# Patient Record
Sex: MALE | Race: WHITE | HISPANIC OR LATINO | Employment: OTHER | URBAN - METROPOLITAN AREA
[De-identification: names, ages, dates, MRNs, and addresses within clinical notes are randomized per-mention and may not be internally consistent; named-entity substitution may affect disease eponyms.]

---

## 2017-01-24 ENCOUNTER — ALLSCRIPTS OFFICE VISIT (OUTPATIENT)
Dept: OTHER | Facility: OTHER | Age: 62
End: 2017-01-24

## 2017-01-26 ENCOUNTER — TRANSCRIBE ORDERS (OUTPATIENT)
Dept: LAB | Facility: CLINIC | Age: 62
End: 2017-01-26

## 2017-01-26 ENCOUNTER — APPOINTMENT (OUTPATIENT)
Dept: LAB | Facility: CLINIC | Age: 62
End: 2017-01-26
Payer: MEDICARE

## 2017-01-26 DIAGNOSIS — E78.5 HYPERLIPIDEMIA: ICD-10-CM

## 2017-01-26 DIAGNOSIS — E11.9 TYPE 2 DIABETES MELLITUS WITHOUT COMPLICATIONS (HCC): ICD-10-CM

## 2017-01-26 DIAGNOSIS — I10 ESSENTIAL (PRIMARY) HYPERTENSION: ICD-10-CM

## 2017-01-26 LAB
ALBUMIN SERPL BCP-MCNC: 3.6 G/DL (ref 3.5–5)
ALP SERPL-CCNC: 95 U/L (ref 46–116)
ALT SERPL W P-5'-P-CCNC: 15 U/L (ref 12–78)
ANION GAP SERPL CALCULATED.3IONS-SCNC: 9 MMOL/L (ref 4–13)
AST SERPL W P-5'-P-CCNC: 13 U/L (ref 5–45)
BASOPHILS # BLD AUTO: 0.04 THOUSANDS/ΜL (ref 0–0.1)
BASOPHILS NFR BLD AUTO: 0 % (ref 0–1)
BILIRUB SERPL-MCNC: 0.5 MG/DL (ref 0.2–1)
BUN SERPL-MCNC: 14 MG/DL (ref 5–25)
CALCIUM SERPL-MCNC: 8.7 MG/DL (ref 8.3–10.1)
CHLORIDE SERPL-SCNC: 103 MMOL/L (ref 100–108)
CHOLEST SERPL-MCNC: 140 MG/DL (ref 50–200)
CO2 SERPL-SCNC: 26 MMOL/L (ref 21–32)
CREAT SERPL-MCNC: 0.82 MG/DL (ref 0.6–1.3)
EOSINOPHIL # BLD AUTO: 0.35 THOUSAND/ΜL (ref 0–0.61)
EOSINOPHIL NFR BLD AUTO: 3 % (ref 0–6)
ERYTHROCYTE [DISTWIDTH] IN BLOOD BY AUTOMATED COUNT: 12.6 % (ref 11.6–15.1)
EST. AVERAGE GLUCOSE BLD GHB EST-MCNC: 123 MG/DL
GFR SERPL CREATININE-BSD FRML MDRD: >60 ML/MIN/1.73SQ M
GLUCOSE SERPL-MCNC: 88 MG/DL (ref 65–140)
HBA1C MFR BLD: 5.9 % (ref 4.2–6.3)
HCT VFR BLD AUTO: 42.9 % (ref 36.5–49.3)
HDLC SERPL-MCNC: 58 MG/DL (ref 40–60)
HGB BLD-MCNC: 14.1 G/DL (ref 12–17)
LDLC SERPL CALC-MCNC: 60 MG/DL (ref 0–100)
LYMPHOCYTES # BLD AUTO: 2.74 THOUSANDS/ΜL (ref 0.6–4.47)
LYMPHOCYTES NFR BLD AUTO: 27 % (ref 14–44)
MCH RBC QN AUTO: 27.4 PG (ref 26.8–34.3)
MCHC RBC AUTO-ENTMCNC: 32.9 G/DL (ref 31.4–37.4)
MCV RBC AUTO: 84 FL (ref 82–98)
MONOCYTES # BLD AUTO: 0.92 THOUSAND/ΜL (ref 0.17–1.22)
MONOCYTES NFR BLD AUTO: 9 % (ref 4–12)
NEUTROPHILS # BLD AUTO: 6.16 THOUSANDS/ΜL (ref 1.85–7.62)
NEUTS SEG NFR BLD AUTO: 61 % (ref 43–75)
NRBC BLD AUTO-RTO: 0 /100 WBCS
PLATELET # BLD AUTO: 296 THOUSANDS/UL (ref 149–390)
PMV BLD AUTO: 10.8 FL (ref 8.9–12.7)
POTASSIUM SERPL-SCNC: 3.6 MMOL/L (ref 3.5–5.3)
PROT SERPL-MCNC: 7.6 G/DL (ref 6.4–8.2)
RBC # BLD AUTO: 5.14 MILLION/UL (ref 3.88–5.62)
SODIUM SERPL-SCNC: 138 MMOL/L (ref 136–145)
TRIGL SERPL-MCNC: 109 MG/DL
TSH SERPL DL<=0.05 MIU/L-ACNC: 1.4 UIU/ML (ref 0.36–3.74)
WBC # BLD AUTO: 10.24 THOUSAND/UL (ref 4.31–10.16)

## 2017-01-26 PROCEDURE — 80053 COMPREHEN METABOLIC PANEL: CPT

## 2017-01-26 PROCEDURE — 36415 COLL VENOUS BLD VENIPUNCTURE: CPT

## 2017-01-26 PROCEDURE — 84443 ASSAY THYROID STIM HORMONE: CPT

## 2017-01-26 PROCEDURE — 83036 HEMOGLOBIN GLYCOSYLATED A1C: CPT

## 2017-01-26 PROCEDURE — 85025 COMPLETE CBC W/AUTO DIFF WBC: CPT

## 2017-01-26 PROCEDURE — 80061 LIPID PANEL: CPT

## 2017-02-06 ENCOUNTER — GENERIC CONVERSION - ENCOUNTER (OUTPATIENT)
Dept: OTHER | Facility: OTHER | Age: 62
End: 2017-02-06

## 2017-02-27 ENCOUNTER — ALLSCRIPTS OFFICE VISIT (OUTPATIENT)
Dept: OTHER | Facility: OTHER | Age: 62
End: 2017-02-27

## 2017-03-16 ENCOUNTER — GENERIC CONVERSION - ENCOUNTER (OUTPATIENT)
Dept: OTHER | Facility: OTHER | Age: 62
End: 2017-03-16

## 2017-06-09 ENCOUNTER — ALLSCRIPTS OFFICE VISIT (OUTPATIENT)
Dept: OTHER | Facility: OTHER | Age: 62
End: 2017-06-09

## 2017-06-23 ENCOUNTER — ALLSCRIPTS OFFICE VISIT (OUTPATIENT)
Dept: OTHER | Facility: OTHER | Age: 62
End: 2017-06-23

## 2017-06-27 ENCOUNTER — APPOINTMENT (OUTPATIENT)
Dept: LAB | Facility: CLINIC | Age: 62
End: 2017-06-27
Payer: MEDICARE

## 2017-06-27 DIAGNOSIS — E78.5 HYPERLIPIDEMIA: ICD-10-CM

## 2017-06-27 DIAGNOSIS — E11.9 TYPE 2 DIABETES MELLITUS WITHOUT COMPLICATIONS (HCC): ICD-10-CM

## 2017-06-27 LAB
ALBUMIN SERPL BCP-MCNC: 3.5 G/DL (ref 3.5–5)
ALP SERPL-CCNC: 69 U/L (ref 46–116)
ALT SERPL W P-5'-P-CCNC: 16 U/L (ref 12–78)
ANION GAP SERPL CALCULATED.3IONS-SCNC: 7 MMOL/L (ref 4–13)
AST SERPL W P-5'-P-CCNC: 20 U/L (ref 5–45)
BASOPHILS # BLD AUTO: 0.04 THOUSANDS/ΜL (ref 0–0.1)
BASOPHILS NFR BLD AUTO: 1 % (ref 0–1)
BILIRUB SERPL-MCNC: 1.04 MG/DL (ref 0.2–1)
BUN SERPL-MCNC: 20 MG/DL (ref 5–25)
CALCIUM SERPL-MCNC: 8.7 MG/DL (ref 8.3–10.1)
CHLORIDE SERPL-SCNC: 103 MMOL/L (ref 100–108)
CHOLEST SERPL-MCNC: 179 MG/DL (ref 50–200)
CO2 SERPL-SCNC: 28 MMOL/L (ref 21–32)
CREAT SERPL-MCNC: 0.86 MG/DL (ref 0.6–1.3)
CREAT UR-MCNC: 125 MG/DL
EOSINOPHIL # BLD AUTO: 0.44 THOUSAND/ΜL (ref 0–0.61)
EOSINOPHIL NFR BLD AUTO: 5 % (ref 0–6)
ERYTHROCYTE [DISTWIDTH] IN BLOOD BY AUTOMATED COUNT: 13.1 % (ref 11.6–15.1)
EST. AVERAGE GLUCOSE BLD GHB EST-MCNC: 131 MG/DL
GFR SERPL CREATININE-BSD FRML MDRD: >60 ML/MIN/1.73SQ M
GLUCOSE P FAST SERPL-MCNC: 101 MG/DL (ref 65–99)
HBA1C MFR BLD: 6.2 % (ref 4.2–6.3)
HCT VFR BLD AUTO: 43 % (ref 36.5–49.3)
HDLC SERPL-MCNC: 62 MG/DL (ref 40–60)
HGB BLD-MCNC: 14 G/DL (ref 12–17)
LDLC SERPL CALC-MCNC: 83 MG/DL (ref 0–100)
LYMPHOCYTES # BLD AUTO: 3.23 THOUSANDS/ΜL (ref 0.6–4.47)
LYMPHOCYTES NFR BLD AUTO: 37 % (ref 14–44)
MCH RBC QN AUTO: 27.2 PG (ref 26.8–34.3)
MCHC RBC AUTO-ENTMCNC: 32.6 G/DL (ref 31.4–37.4)
MCV RBC AUTO: 84 FL (ref 82–98)
MICROALBUMIN UR-MCNC: 5.7 MG/L (ref 0–20)
MICROALBUMIN/CREAT 24H UR: 5 MG/G CREATININE (ref 0–30)
MONOCYTES # BLD AUTO: 0.92 THOUSAND/ΜL (ref 0.17–1.22)
MONOCYTES NFR BLD AUTO: 11 % (ref 4–12)
NEUTROPHILS # BLD AUTO: 4.08 THOUSANDS/ΜL (ref 1.85–7.62)
NEUTS SEG NFR BLD AUTO: 46 % (ref 43–75)
NRBC BLD AUTO-RTO: 0 /100 WBCS
PLATELET # BLD AUTO: 302 THOUSANDS/UL (ref 149–390)
PMV BLD AUTO: 10.6 FL (ref 8.9–12.7)
POTASSIUM SERPL-SCNC: 3.7 MMOL/L (ref 3.5–5.3)
PROT SERPL-MCNC: 6.9 G/DL (ref 6.4–8.2)
RBC # BLD AUTO: 5.14 MILLION/UL (ref 3.88–5.62)
SODIUM SERPL-SCNC: 138 MMOL/L (ref 136–145)
TRIGL SERPL-MCNC: 172 MG/DL
TSH SERPL DL<=0.05 MIU/L-ACNC: 1.36 UIU/ML (ref 0.36–3.74)
WBC # BLD AUTO: 8.74 THOUSAND/UL (ref 4.31–10.16)

## 2017-06-27 PROCEDURE — 85025 COMPLETE CBC W/AUTO DIFF WBC: CPT

## 2017-06-27 PROCEDURE — 83036 HEMOGLOBIN GLYCOSYLATED A1C: CPT

## 2017-06-27 PROCEDURE — 82570 ASSAY OF URINE CREATININE: CPT

## 2017-06-27 PROCEDURE — 80061 LIPID PANEL: CPT

## 2017-06-27 PROCEDURE — 36415 COLL VENOUS BLD VENIPUNCTURE: CPT

## 2017-06-27 PROCEDURE — 80053 COMPREHEN METABOLIC PANEL: CPT

## 2017-06-27 PROCEDURE — 82043 UR ALBUMIN QUANTITATIVE: CPT

## 2017-06-27 PROCEDURE — 84443 ASSAY THYROID STIM HORMONE: CPT

## 2017-06-29 ENCOUNTER — GENERIC CONVERSION - ENCOUNTER (OUTPATIENT)
Dept: OTHER | Facility: OTHER | Age: 62
End: 2017-06-29

## 2017-06-29 LAB
LEFT EYE DIABETIC RETINOPATHY: NORMAL
RIGHT EYE DIABETIC RETINOPATHY: NORMAL

## 2017-07-17 ENCOUNTER — ALLSCRIPTS OFFICE VISIT (OUTPATIENT)
Dept: OTHER | Facility: OTHER | Age: 62
End: 2017-07-17

## 2017-07-27 ENCOUNTER — ALLSCRIPTS OFFICE VISIT (OUTPATIENT)
Dept: OTHER | Facility: OTHER | Age: 62
End: 2017-07-27

## 2017-09-26 ENCOUNTER — ALLSCRIPTS OFFICE VISIT (OUTPATIENT)
Dept: OTHER | Facility: OTHER | Age: 62
End: 2017-09-26

## 2017-11-17 ENCOUNTER — APPOINTMENT (OUTPATIENT)
Dept: LAB | Facility: CLINIC | Age: 62
End: 2017-11-17
Payer: MEDICARE

## 2017-11-17 DIAGNOSIS — E11.9 TYPE 2 DIABETES MELLITUS WITHOUT COMPLICATIONS (HCC): ICD-10-CM

## 2017-11-17 DIAGNOSIS — I10 ESSENTIAL (PRIMARY) HYPERTENSION: ICD-10-CM

## 2017-11-17 DIAGNOSIS — E78.5 HYPERLIPIDEMIA: ICD-10-CM

## 2017-11-17 LAB
ALBUMIN SERPL BCP-MCNC: 3.6 G/DL (ref 3.5–5)
ALP SERPL-CCNC: 84 U/L (ref 46–116)
ALT SERPL W P-5'-P-CCNC: 15 U/L (ref 12–78)
ANION GAP SERPL CALCULATED.3IONS-SCNC: 7 MMOL/L (ref 4–13)
AST SERPL W P-5'-P-CCNC: 23 U/L (ref 5–45)
BASOPHILS # BLD AUTO: 0.04 THOUSANDS/ΜL (ref 0–0.1)
BASOPHILS NFR BLD AUTO: 1 % (ref 0–1)
BILIRUB SERPL-MCNC: 0.83 MG/DL (ref 0.2–1)
BUN SERPL-MCNC: 13 MG/DL (ref 5–25)
CALCIUM SERPL-MCNC: 8.7 MG/DL (ref 8.3–10.1)
CHLORIDE SERPL-SCNC: 105 MMOL/L (ref 100–108)
CHOLEST SERPL-MCNC: 142 MG/DL (ref 50–200)
CO2 SERPL-SCNC: 28 MMOL/L (ref 21–32)
CREAT SERPL-MCNC: 0.96 MG/DL (ref 0.6–1.3)
EOSINOPHIL # BLD AUTO: 0.12 THOUSAND/ΜL (ref 0–0.61)
EOSINOPHIL NFR BLD AUTO: 2 % (ref 0–6)
ERYTHROCYTE [DISTWIDTH] IN BLOOD BY AUTOMATED COUNT: 12.9 % (ref 11.6–15.1)
EST. AVERAGE GLUCOSE BLD GHB EST-MCNC: 137 MG/DL
GFR SERPL CREATININE-BSD FRML MDRD: 84 ML/MIN/1.73SQ M
GLUCOSE P FAST SERPL-MCNC: 104 MG/DL (ref 65–99)
HBA1C MFR BLD: 6.4 % (ref 4.2–6.3)
HCT VFR BLD AUTO: 44.2 % (ref 36.5–49.3)
HDLC SERPL-MCNC: 63 MG/DL (ref 40–60)
HGB BLD-MCNC: 14.8 G/DL (ref 12–17)
LDLC SERPL CALC-MCNC: 51 MG/DL (ref 0–100)
LYMPHOCYTES # BLD AUTO: 2.36 THOUSANDS/ΜL (ref 0.6–4.47)
LYMPHOCYTES NFR BLD AUTO: 31 % (ref 14–44)
MCH RBC QN AUTO: 27.8 PG (ref 26.8–34.3)
MCHC RBC AUTO-ENTMCNC: 33.5 G/DL (ref 31.4–37.4)
MCV RBC AUTO: 83 FL (ref 82–98)
MONOCYTES # BLD AUTO: 0.67 THOUSAND/ΜL (ref 0.17–1.22)
MONOCYTES NFR BLD AUTO: 9 % (ref 4–12)
NEUTROPHILS # BLD AUTO: 4.36 THOUSANDS/ΜL (ref 1.85–7.62)
NEUTS SEG NFR BLD AUTO: 57 % (ref 43–75)
NRBC BLD AUTO-RTO: 0 /100 WBCS
PLATELET # BLD AUTO: 365 THOUSANDS/UL (ref 149–390)
PMV BLD AUTO: 10.4 FL (ref 8.9–12.7)
POTASSIUM SERPL-SCNC: 3.9 MMOL/L (ref 3.5–5.3)
PROT SERPL-MCNC: 7.6 G/DL (ref 6.4–8.2)
RBC # BLD AUTO: 5.33 MILLION/UL (ref 3.88–5.62)
SODIUM SERPL-SCNC: 140 MMOL/L (ref 136–145)
TRIGL SERPL-MCNC: 142 MG/DL
TSH SERPL DL<=0.05 MIU/L-ACNC: 0.48 UIU/ML (ref 0.36–3.74)
WBC # BLD AUTO: 7.56 THOUSAND/UL (ref 4.31–10.16)

## 2017-11-17 PROCEDURE — 83036 HEMOGLOBIN GLYCOSYLATED A1C: CPT

## 2017-11-17 PROCEDURE — 80053 COMPREHEN METABOLIC PANEL: CPT

## 2017-11-17 PROCEDURE — 36415 COLL VENOUS BLD VENIPUNCTURE: CPT

## 2017-11-17 PROCEDURE — 85025 COMPLETE CBC W/AUTO DIFF WBC: CPT

## 2017-11-17 PROCEDURE — 84443 ASSAY THYROID STIM HORMONE: CPT

## 2017-11-17 PROCEDURE — 80061 LIPID PANEL: CPT

## 2017-11-27 ENCOUNTER — ALLSCRIPTS OFFICE VISIT (OUTPATIENT)
Dept: OTHER | Facility: OTHER | Age: 62
End: 2017-11-27

## 2017-11-28 NOTE — PROGRESS NOTES
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)  8  Overweight (278 02) (E66 3)    Plan  Hyperlipidemia    · (1) LIPID PANEL, FASTING; Status:Active; Requested for:27Mar2018;    · (1) TSH; Status:Active; Requested for:27Mar2018; Overweight    · Keep a diary of when and what you eat ; Status:Complete;   Done: 94ZNC2678   · Some eating tips that can help you lose weight ; Status:Complete;   Done: 14EKR4374   · We recommend that you bring your body mass index down to 26 ; Status:Complete;   Done:27Nov2017  Type 2 diabetes mellitus    · (1) CBC/PLT/DIFF; Status:Active; Requested for:27Mar2018;    · (1) COMPREHENSIVE METABOLIC PANEL; Status:Active; Requested for:27Mar2018;    · (1) HEMOGLOBIN A1C; Status:Active; Requested for:27Mar2018; Discussion/Summary  Discussion Summary:   Type 2 diabetes - stable on metformin, hemoglobin A1c was 6 4  He was told to continue taking the medication  blood pressure stable, continue the same medicationlipid profile within normal limitsstable on inhalers  anomaly- stable and asymptomatic stable on medication but is a little more depressed during the winter, follows up with psychiatry  Counseling Documentation With Imm: The patient was counseled regarding diagnostic results,-- instructions for management,-- risk factor reductions,-- risks and benefits of treatment options,-- importance of compliance with treatment  Medication SE Review and Pt Understands Tx: Possible side effects of new medications were reviewed with the patient/guardian today  The treatment plan was reviewed with the patient/guardian   The patient/guardian understands and agrees with the treatment plan      Chief Complaint  Chief Complaint Chronic Condition  Ryan Hayes: Patient is here today for follow up of chronic conditions described in HPI  History of Present Illness  COPD (Follow-Up): The patient states he has been stable with his COPD control since the last visit  He has no comorbid illnesses  He has no significant interval events  Symptoms: The patient is currently asymptomatic  Depression (Follow-Up): The patient states his depression has been stable since the last visit  He has no comorbid illnesses  He has had no significant interval events  Interval Symptoms: The patient is currently asymptomatic  Social Support: the patient has good social support  Medications: the patient is adherent with his medication regimen  -- He denies medication side effects  Medication(s): a SSRI  Hyperlipidemia (Follow-Up): The patient states his hyperlipidemia has been stable since the last visit  Comorbid Illnesses: diabetes mellitus-- and-- hypertension  He has no significant interval events  Symptoms: The patient is currently asymptomatic  Medications: the patient is adherent with his medication regimen  -- He denies medication side effects  Hypertension (Follow-Up): The patient presents for follow-up of essential hypertension  The patient states he has been stable with his blood pressure control since the last visit  He has no comorbid illnesses  He has no significant interval events  Symptoms: The patient is currently asymptomatic  Medications: the patient is adherent with his medication regimen  -- He denies medication side effects  Diabetes Type II (Follow-Up): The patient states he has been stable with his Type II Diabetes control since the last visit  Comorbid Illnesses: hypertension-- and-- hyperlipidemia  He has no known diabetic complications  He has no significant interval events  Symptoms: The patient is currently asymptomatic  Medications: the patient is adherent with his medication regimen  -- He denies medication side effects        Review of Systems  Complete-Male:  Constitutional: No fever or chills, feels well, no tiredness, no recent weight gain or weight loss  Eyes: No complaints of eye pain, no red eyes, no discharge from eyes, no itchy eyes  ENT: no complaints of earache, no hearing loss, no nosebleeds, no nasal discharge, no sore throat, no hoarseness  Cardiovascular: No complaints of slow heart rate, no fast heart rate, no chest pain, no palpitations, no leg claudication, no lower extremity  Respiratory: No complaints of shortness of breath, no wheezing, no cough, no SOB on exertion, no orthopnea or PND  Gastrointestinal: No complaints of abdominal pain, no constipation, no nausea or vomiting, no diarrhea or bloody stools  Genitourinary: No complaints of dysuria, no incontinence, no hesitancy, no nocturia, no genital lesion, no testicular pain  Musculoskeletal: No complaints of arthralgia, no myalgias, no joint swelling or stiffness, no limb pain or swelling  Integumentary: No complaints of skin rash or skin lesions, no itching, no skin wound, no dry skin  Neurological: No compliants of headache, no confusion, no convulsions, no numbness or tingling, no dizziness or fainting, no limb weakness, no difficulty walking  Psychiatric: depression  Endocrine: No complaints of proptosis, no hot flashes, no muscle weakness, no erectile dysfunction, no deepening of the voice, no feelings of weakness  Hematologic/Lymphatic: No complaints of swollen glands, no swollen glands in the neck, does not bleed easily, no easy bruising  Active Problems  1  Allergic reaction (995 3) (T78 40XA)  2  Anxiety disorder (300 00) (F41 9)  3  Cardiac arrhythmia (427 9) (I49 9)  4  Chronic obstructive pulmonary disease (496) (J44 9)  5  Depression (311) (F32 9)  6  Diastolic dysfunction (422 1) (I51 9)  7  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 5)  8  GERD without esophagitis (530 81) (K21 9)  9  Hyperlipidemia (272 4) (E78 5)  10  Hypertension (401 9) (I10)  11  Hypertensive heart disease (402 90) (I11 9)  12  Need for influenza vaccination (V04 81) (Z23)  13  Need for revaccination (V05 9) (Z23)  14  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)  15  Tricuspid valve disorders, 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)  16  Type 2 diabetes mellitus (250 00) (E11 9)    Past Medical History  1  History of Abdominal bloating (787 3) (R14 0)  2  History of Alcohol abuse (305 00) (F10 10)  3  History of Allergic rhinitis (477 9) (J30 9)  4  History of Asthma (493 90) (J45 909)  5  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)  6  History of Campylobacter enteritis (008 43) (A04 5)  7  History of Chronic cough (786 2) (R05)  8  History of Dark stools (792 1) (R19 5)  9  History of Dermatitis due to drugs or medicines (693 0) (L27 0)  10  History of Dermatophytosis of nail (110 1) (B35 1)  11  History of Digestive symptom (787 99) (R19 8)  12  History of EKG, abnormal (794 31) (R94 31)  13  History of Fecal occult blood test positive (792 1) (R19 5)  14  History of Flu vaccine need (V04 81) (Z23)  15  History of Flu vaccine need (V04 81) (Z23)  16  History of Generalized osteoarthritis (715 00) (M15 9)  17  History of acute bronchitis (V12 69) (Z87 09)  18  History of acute respiratory failure (V12 69) (Z87 09)  19  History of acute sinusitis (V12 69) (Z87 09)  20  History of backache (V13 59) (Z87 39)  21  History of benign neoplasm of colon (V12 79) (Z86 018)  22  History of chest pain (V13 89) (Z87 898)  23  History of chronic fatigue syndrome (V13 89) (Z87 898)  24  History of chronic sinusitis (V12 69) (Z87 09)  25  History of diarrhea (V12 79) (Z87 898)  26  History of epistaxis (V12 69) (Z87 898)  27  History of epistaxis (V12 69) (Z87 898)  28  History of gastritis (V12 79) (Z87 19)  29  History of Helicobacter infection (V12 09) (Z86 19)  30  History of shortness of breath (V13 89) (Z87 898)  31   History of urinary frequency (V13 09) (Z87 898)  32  History of Melena (578 1) (K92 1)  33  History of Myalgia and myositis (729 1)  34  History of Nocturia (788 43) (R35 1)  35  Personal history of cardiac arrhythmia (V12 59) (Z86 79)  36  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)  37  Personal history of urinary tract infection (V13 02) (Z87 440)  38  History of Poisoning by drug or medicinal substance (977 9,E980 5) (T50 901A)  39  History of Prostate cancer screening (V76 44) (Z12 5)  Active Problems And Past Medical History Reviewed: The active problems and past medical history were reviewed and updated today  Surgical History  1  History of Tonsillectomy  Surgical History Reviewed: The surgical history was reviewed and updated today  Family History  Mother   1  Family history of asthma (V17 5) (Z82 5)  Family History Reviewed: The family history was reviewed and updated today  Social History     · Alcohol use   · Former smoker (B46 45) (E08 100)   · Former smoker   · Non smoker / tobacco user (V49 89) (Z78 9)   · Non-smoker (V49 89) (Z78 9)  Social History Reviewed: The social history was reviewed and updated today  The social history was reviewed and is unchanged  Current Meds  1  Advair Diskus 250-50 MCG/DOSE Inhalation Aerosol Powder Breath Activated; INHALE 1 PUFF TWICE DAILY; Therapy: 98VOM5696 to (Last Rx:13Nov2017)  Requested for: 30SMA0970 Ordered  2  Aspirin EC 81 MG Oral Tablet Delayed Release; TAKE 1 TABLET DAILY; Therapy: 61HIQ7516 to (Evaluate:19Vhp2652); Last Rx:30Jun2015 Ordered  3  Atorvastatin Calcium 10 MG Oral Tablet; Take 1 tablet daily; Therapy: 07ZGO3309 to (Renew:16Neo8616)  Requested for: 33YJD6000; Last Rx:18Okx8501 Ordered  4  BusPIRone HCl - 15 MG Oral Tablet; TAKE 1 TABLET TWICE DAILY; Therapy: 74MFD7138 to Recorded  5  DilTIAZem HCl ER Coated Beads 180 MG Oral Capsule Extended Release 24 Hour; TAKE ONE CAPSULE BY MOUTH EVERY DAY;  Therapy: 66XGZ6863 to (Evaluate:10Nov2018)  Requested for: 26AAH1447; Last Rx:54Wkb0320 Ordered  6  Lisinopril 20 MG Oral Tablet; Take 1 tablet daily; Therapy: 04BPP5550 to (Evaluate:52Zvc4739)  Requested for: 98AKF7698; Last Rx:14Nov2017 Ordered  7  MetFORMIN HCl - 500 MG Oral Tablet; TAKE 1 TABLET TWICE DAILY; Therapy: 50TUM6889 to (Zachary Whalen)  Requested for: 90MHW4125; Last Rx:37Vcz6181 Ordered  8  Omega 3 1000 MG Oral Capsule; TAKE 1 CAPSULE DAILY; Therapy: 57YII0990 to Recorded  9  OneTouch Delica Lancets Fine Miscellaneous; Therapy: 99HMG8789 to Recorded  10  OneTouch Ultra Blue In Vitro Strip; TEST ONCE DAILY; Therapy: 62JSG1847 to (Renew:95Ekx2531)  Requested for: 86CWN3553; Last Rx:55Dal1122  Ordered  11  ProAir  (90 Base) MCG/ACT Inhalation Aerosol Solution; INHALE 2 PUFFS EVERY 4 HOURS  AS NEEDED; Therapy: 28HEC1911 to (Last Rx:80Jbx4195)  Requested for: 05Nen6590 Ordered  12  PROzac 40 MG Oral Capsule; TAKE 1 CAPSULE DAILY; Therapy: 37KFZ3766 to Recorded  13  Temazepam 30 MG Oral Capsule; TAKE 1 CAPSULE AT BEDTIME; Therapy: 65NUT6634 to Recorded  Medication List Reviewed: The medication list was reviewed and updated today  Allergies  1  No Known Drug Allergies    Vitals  Vital Signs    Recorded: 20SKL9809 09:07AM   Heart Rate 80   Respiration 14   Systolic 175   Diastolic 88   Height 5 ft 3 5 in   Weight 159 lb    BMI Calculated 27 72   BSA Calculated 1 76       Physical Exam   Constitutional  General appearance: Abnormal   overweight  Head and Face  Head and face: Normal    Palpation of the face and sinuses: No sinus tenderness  Eyes  Conjunctiva and lids: No erythema, swelling or discharge  Pupils and irises: Equal, round, reactive to light  Ears, Nose, Mouth, and Throat  External inspection of ears and nose: Normal    Otoscopic examination: Tympanic membranes translucent with normal light reflex  Canals patent without erythema     Oropharynx: Normal with no erythema, edema, exudate or lesions  Neck  Neck: Supple, symmetric, trachea midline, no masses  Thyroid: Normal, no thyromegaly  Pulmonary  Respiratory effort: No increased work of breathing or signs of respiratory distress  Auscultation of lungs: Clear to auscultation  Cardiovascular  Palpation of heart: Normal PMI, no thrills  Auscultation of heart: Normal rate and rhythm, normal S1 and S2, no murmurs  Examination of extremities for edema and/or varicosities: Normal    Abdomen  Abdomen: Non-tender, no masses  Lymphatic  Palpation of lymph nodes in neck: No lymphadenopathy  Musculoskeletal  Gait and station: Normal    Skin  Skin and subcutaneous tissue: Normal without rashes or lesions  Neurologic  Cranial nerves: Cranial nerves 2-12 intact  Cortical function: Normal mental status  Reflexes: 2+ and symmetric  Sensation: No sensory loss  Psychiatric  Judgment and insight: Normal    Mood and affect: Abnormal   Mood and Affect: depressed  Health Management  History of diarrhea   COLONOSCOPY; every 10 years; Last 12LEC9651; Next Due: 83OFB1974; Active  Health Maintenance   COLONOSCOPY; every 10 years; Last 79CSZ2884; Next Due: 92IOC8775; Active  Fluzone Intramuscular Injectable; every 1 year; Last 49KVS8312; Next Due: 15LOY0076; Overdue    Future Appointments    Date/Time Provider Specialty Site   01/04/2018 08:40 AM MEIR Ruffin  Cardiology [facility]   04/10/2018 09:45 AM MEIR Lee   Internal Medicine [facility]       Signatures   Electronically signed by : MEIR Pennington ; Nov 27 2017  9:59AM EST                       (Author)    Electronically signed by : MEIR Pennington ; Nov 27 2017 10:00AM EST                       (Author)

## 2018-01-04 ENCOUNTER — ALLSCRIPTS OFFICE VISIT (OUTPATIENT)
Dept: OTHER | Facility: OTHER | Age: 63
End: 2018-01-04

## 2018-01-05 NOTE — PROGRESS NOTES
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)    Plan                                 TTE Echocardiography Transthoracic; Status:Need Information - Financial Authorization; Requested [id number];      Perform:[facility]; YJH:[id number];[facility];       For:Hypertension, Hypertensive heart disease; Ordered By:Timothy Ivy;       Discussion/Summary   Cardiology Discussion Summary Free Text Note Form [facility]:    Ebstein's anomaly - 2-D echocardiogram (2015) - LV EF 44%, grade 1 diastolic dysfunction, LA & RA enlargement, mild Ebstein's anomaly, mild TR & KY echocardiogram (2016) - LVEF 89%, grade 1 diastolic dysfunction, LA & RA enlargement, mild Ebstein's anomaly, mild TR & KY   HHD, DD - Patient's BP is stable  Cont current meds  - continue statin  His family physician closely monitors his blood work   KY - stable   aggressive risk factor modification and therapeutic lifestyle changes  Low salt, low calorie, low fat, low cholesterol diet with regular exercise and to optimize weight  I will defer the ordering and monitoring of necessary lab studies to you, but I am available and happy to review and manage any of that data at your request in the future  concepts of atherosclerosis, including signs and symptoms of cardiac disease  studies were reviewed  measures were reviewed  were entertained and answered  was advised to report any problem requiring medical attention  up with appropriate specialists and lab work as discussed  for follow up visit as scheduled or earlier, if needed  you for allowing me to participate in the care and evaluation of your patient   Should you have any questions, please feel free to contact me  Goals and Barriers: The patient has the current Goals: Close monitoring of Ebstein's anomaly  The patent has the current Barriers: None  Patient's Capacity to Self-Care: Patient is able to Self-Care  Medication SE Review and Pt Understands Tx: Possible side effects of new medications were reviewed with the patient/guardian today  The treatment plan was reviewed with the patient/guardian  The patient/guardian understands and agrees with the treatment plan    Counseling Documentation With Imm: The patient was counseled regarding instructions for management,-- risk factor reductions,-- patient and family education,-- importance of compliance with treatment  Chief Complaint   Chief Complaint Chronic Condition [facility]: Patient is here today for follow up of chronic conditions described in HPI  History of Present Illness   Cardiology HPI Free Text Note Form [facility]: Patient here for follow-up  anomaly - patient states he is doing well  Denies complaints of chest pain/pressure, palpitations, lightheadedness, shortness of breath, syncope, swelling feet orthopnea, PND, claudication  Denies history of atrial fibrillation  States he exercises regularly on treadmill without any problems   HHD, DD - states he is taking his medications regularly denies lightheadedness, headache, medication side effects  taking his statin regularly  his PCP closely monitors his blood work  And patient has no complaints of myalgia   ME - stable      Review of Systems   Cardiology Male ROS:         Cardiac: No complaints of chest pain, no palpitations, no fainiting  Skin: No complaints of nonhealing sores or skin rash  Genitourinary: No complaints of recurrent urinary tract infections, frequent urination at night, difficult urination, blood in urine, kidney stones, loss of bladder control, no kidney or prostate problems, no erectile dysfunction        Psychological: No complaints of feeling depressed, anxiety, panic attacks, or difficulty concentrating  General: No complaints of trouble sleeping, lack of energy, fatigue, appetite changes, weight changes, fever, frequent infections, or night sweats  Respiratory: No complaints of shortness of breath, cough with sputum, or wheezing  HEENT: No complaints of serious problems, hearing problems, nose problems, throat problems, or snoring  Gastrointestinal: No complaints of liver problems, nausea, vomiting, heartburn, constipation, bloody stools, diarrhea, problems swallowing, adbominal pain, or rectal bleeding  Hematologic: No complaints of bleeding disorders, anemia, blood clots, or excessive brusing  Neurological: No complaints of numbness, tingling, dizziness, weakness, seizures, headaches, syncope or fainting, AM fatigue, daytime sleepiness, no witnessed apnea episodes  Musculoskeletal: No complaints of arthritis, back pain, or painfull swelling  ROS Reviewed:    ROS reviewed  Active Problems   Problems    1  Allergic reaction (995 3) (T78 40XA)   2  Anxiety disorder (300 00) (F41 9)   3  Cardiac arrhythmia (427 9) (I49 9)   4  Chronic obstructive pulmonary disease (496) (J44 9)   5  Depression (311) (F32 9)   6  Diastolic dysfunction (267 3) (I51 9)   7  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 5)   8  GERD without esophagitis (530 81) (K21 9)   9  Hyperlipidemia (272 4) (E78 5)   10  Hypertension (401 9) (I10)   11  Hypertensive heart disease (402 90) (I11 9)   12  Need for influenza vaccination (V04 81) (Z23)   13  Need for revaccination (V05 9) (Z23)   14  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)   15  Overweight (278 02) (E66 3)   16  Tricuspid valve disorders, 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)   17  Type 2 diabetes mellitus (250 00) (E11 9)    Past Medical History   Problems    1  History of Abdominal bloating (787 3) (R14 0)   2  History of Alcohol abuse (305 00) (F10 10)   3   History of Allergic rhinitis (477 9) (J30 9)   4  History of Asthma (493 90) (J45 909)   5  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)   6  History of Campylobacter enteritis (008 43) (A04 5)   7  History of Chronic cough (786 2) (R05)   8  History of Dark stools (792 1) (R19 5)   9  History of Dermatitis due to drugs or medicines (693 0) (L27 0)   10  History of Dermatophytosis of nail (110 1) (B35 1)   11  History of Digestive symptom (787 99) (R19 8)   12  History of EKG, abnormal (794 31) (R94 31)   13  History of Fecal occult blood test positive (792 1) (R19 5)   14  History of Flu vaccine need (V04 81) (Z23)   15  History of Flu vaccine need (V04 81) (Z23)   16  History of Generalized osteoarthritis (715 00) (M15 9)   17  History of acute bronchitis (V12 69) (Z87 09)   18  History of acute respiratory failure (V12 69) (Z87 09)   19  History of acute sinusitis (V12 69) (Z87 09)   20  History of backache (V13 59) (Z87 39)   21  History of benign neoplasm of colon (V12 79) (Z86 018)   22  History of chest pain (V13 89) (Z87 898)   23  History of chronic fatigue syndrome (V13 89) (Z87 898)   24  History of chronic sinusitis (V12 69) (Z87 09)   25  History of diarrhea (V12 79) (Z87 898)   26  History of epistaxis (V12 69) (Z87 898)   27  History of epistaxis (V12 69) (Z87 898)   28  History of gastritis (V12 79) (Z87 19)   29  History of Helicobacter infection (V12 09) (Z86 19)   30  History of shortness of breath (V13 89) (Z87 898)   31  History of urinary frequency (V13 09) (Z87 898)   32  History of Melena (578 1) (K92 1)   33  History of Myalgia and myositis (729 1)   34  History of Nocturia (788 43) (R35 1)   35  Personal history of cardiac arrhythmia (V12 59) (Z86 79)   36  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)   37  Personal history of urinary tract infection (V13 02) (Z87 440)   38   History of Poisoning by drug or medicinal substance (977 9,E980 5) (T50 901A)   39  History of Prostate cancer screening (V76 44) (Z12 5)  Active Problems And Past Medical History Reviewed: The active problems and past medical history were reviewed and updated today  Surgical History   Problems    1  History of Tonsillectomy  Surgical History Reviewed: The surgical history was reviewed and updated today  Family History   Mother    1  Family history of asthma (V17 5) (Z82 5)  Family History Reviewed: The family history was reviewed and updated today  Social History   Problems    · Alcohol use   · Former smoker (V44 47) (N46 509)   · Former smoker   · Non smoker / tobacco user (V49 89) (Z78 9)   · Non-smoker (V49 89) (Z78 9)  Social History Reviewed: The social history was reviewed and updated today  The social history was reviewed and is unchanged  Current Meds    1  Advair Diskus 250-50 MCG/DOSE Inhalation Aerosol Powder Breath Activated; INHALE 1     PUFF TWICE DAILY; Therapy: 90KOH1322 to (Last Rx:13Nov2017)  Requested for: 99VEW4426 Ordered   2  Aspirin EC 81 MG Oral Tablet Delayed Release; TAKE 1 TABLET DAILY; Therapy: 96UAS0823 to (Evaluate:46Sss0360); Last Rx:30Jun2015 Ordered   3  Atorvastatin Calcium 10 MG Oral Tablet; Take 1 tablet daily; Therapy: 85ANW5498 to (Renew:29Nov2018)  Requested for: 84UKU9217; Last     Rx:07Meb4200 Ordered   4  BusPIRone HCl - 15 MG Oral Tablet; TAKE 1 TABLET TWICE DAILY; Therapy: 83MRF7124 to Recorded   5  DilTIAZem HCl ER Coated Beads 180 MG Oral Capsule Extended Release 24 Hour;     TAKE ONE CAPSULE BY MOUTH EVERY DAY; Therapy: 40UHJ0144 to (Evaluate:10Nov2018)  Requested for: 99JBI3072; Last     Rx:98Slp4500 Ordered   6  Lisinopril 20 MG Oral Tablet; Take 1 tablet daily; Therapy: 55DOB0290 to (Evaluate:90Yqy0016)  Requested for: 60RXN1230; Last     Rx:14Nov2017 Ordered   7  MetFORMIN HCl - 500 MG Oral Tablet; TAKE 1 TABLET TWICE DAILY;      Therapy: 34JDY3382 to (Tere Hernandez) Requested for: 27ZHZ3357; Last     Rx:53Nab4505 Ordered   8  Omega 3 1000 MG Oral Capsule; TAKE 1 CAPSULE DAILY; Therapy: 87YMV2675 to Recorded   9  OneTouch Delica Lancets Fine Miscellaneous; Therapy: 82XXD1737 to Recorded   10  OneTouch Ultra Blue In Vitro Strip; TEST ONCE DAILY; Therapy: 96QAS1833 to (Renew:53Ozm0205)  Requested for: 60ESB3067; Last      Rx:09Fld0823 Ordered   11  ProAir  (90 Base) MCG/ACT Inhalation Aerosol Solution; INHALE 2 PUFFS      EVERY 4 HOURS AS NEEDED; Therapy: 69EFD7699 to (Last Rx:53Oiu5554)  Requested for: 16Oft8448 Ordered   12  PROzac 40 MG Oral Capsule; TAKE 1 CAPSULE DAILY; Therapy: 52VIS7450 to Recorded   13  Temazepam 30 MG Oral Capsule; TAKE 1 CAPSULE AT BEDTIME; Therapy: 29LOV2437 to Recorded  Medication List Reviewed: The medication list was reviewed and updated today  Medication List Reviewed: The medication list was reviewed and updated today  Allergies   Medication    1  No Known Drug Allergies    Vitals   Vital Signs    Recorded: 71OLD3308 08:44AM   Heart Rate 86   Systolic 874   Diastolic 64   Height 5 ft 3 5 in   Weight 157 lb    BMI Calculated 27 38   BSA Calculated 1 75   O2 Saturation 97     Physical Exam        Constitutional      General appearance: No acute distress, well appearing and well nourished  Eyes      Conjunctiva and Sclera examination: Conjunctiva pink, sclera anicteric  Ears, Nose, Mouth, and Throat - External inspection of ears and nose: Normal without deformities or discharge  -- Oropharynx: Clear, nares are clear, mucous membranes are moist       Neck      Neck and thyroid: Normal, supple, trachea midline, no thyromegaly  Pulmonary      Respiratory effort: No increased work of breathing or signs of respiratory distress  Auscultation of lungs: Clear to auscultation, no rales, no rhonchi, no wheezing, good air movement         Cardiovascular      Palpation of heart: Normal PMI, no thrills  Auscultation of heart: Normal rate and rhythm, normal S1 and S2, no murmurs  Carotid pulses: Normal, 2+ bilaterally  Pedal pulses: Normal, 2+ bilaterally  Examination of extremities for edema and/or varicosities: Normal        Chest - Chest: Normal       Abdomen      Abdomen: Non-tender and no distention  Liver and spleen: No hepatomegaly or splenomegaly  Musculoskeletal Gait and station: Normal gait  -- Digits and nails: Normal without clubbing or cyanosis  -- Inspection/palpation of joints, bones, and muscles: Normal, ROM normal        Skin - Skin and subcutaneous tissue: Normal without rashes or lesions  Skin is warm and well perfused, normal turgor  Neurologic - Speech: Normal        Psychiatric - Orientation to person, place, and time: Normal -- Mood and affect: Normal       Health Management   History of diarrhea   COLONOSCOPY; every 10 years; Last 24EQA5874; Next Due: 63NAK7165; Active  Health Maintenance   COLONOSCOPY; every 10 years; Last 21CJL2589; Next Due: 32JTW1010; Active  Fluzone Intramuscular Injectable; every 1 year; Last 90ALK4097; Next Due: 64DDH3163; Overdue    Future Appointments      Date/Time Provider Specialty Site   04/10/2018 09:45 AM MEIR Chris   Internal Medicine [address] [facility]     Signatures    Electronically signed by : MEIR Fisher ; Jan 4 2018 10:16AM EST                       (Author)

## 2018-01-12 VITALS
DIASTOLIC BLOOD PRESSURE: 74 MMHG | OXYGEN SATURATION: 97 % | HEIGHT: 66 IN | SYSTOLIC BLOOD PRESSURE: 126 MMHG | BODY MASS INDEX: 25.41 KG/M2 | HEART RATE: 80 BPM | WEIGHT: 158.13 LBS

## 2018-01-13 VITALS
HEART RATE: 78 BPM | DIASTOLIC BLOOD PRESSURE: 80 MMHG | WEIGHT: 160.5 LBS | SYSTOLIC BLOOD PRESSURE: 122 MMHG | BODY MASS INDEX: 27.4 KG/M2 | RESPIRATION RATE: 12 BRPM | HEIGHT: 64 IN

## 2018-01-13 VITALS
RESPIRATION RATE: 14 BRPM | BODY MASS INDEX: 27.14 KG/M2 | HEART RATE: 80 BPM | HEIGHT: 64 IN | SYSTOLIC BLOOD PRESSURE: 138 MMHG | DIASTOLIC BLOOD PRESSURE: 88 MMHG | WEIGHT: 159 LBS

## 2018-01-13 VITALS
HEIGHT: 66 IN | HEART RATE: 76 BPM | WEIGHT: 155.19 LBS | SYSTOLIC BLOOD PRESSURE: 108 MMHG | DIASTOLIC BLOOD PRESSURE: 76 MMHG | BODY MASS INDEX: 24.94 KG/M2 | RESPIRATION RATE: 14 BRPM

## 2018-01-13 VITALS
WEIGHT: 158 LBS | OXYGEN SATURATION: 94 % | SYSTOLIC BLOOD PRESSURE: 120 MMHG | BODY MASS INDEX: 26.98 KG/M2 | HEIGHT: 64 IN | HEART RATE: 78 BPM | DIASTOLIC BLOOD PRESSURE: 84 MMHG

## 2018-01-13 VITALS
OXYGEN SATURATION: 97 % | HEART RATE: 83 BPM | DIASTOLIC BLOOD PRESSURE: 86 MMHG | HEIGHT: 64 IN | SYSTOLIC BLOOD PRESSURE: 122 MMHG | WEIGHT: 160.5 LBS | BODY MASS INDEX: 27.4 KG/M2

## 2018-01-13 VITALS
DIASTOLIC BLOOD PRESSURE: 82 MMHG | BODY MASS INDEX: 24.79 KG/M2 | WEIGHT: 154.25 LBS | HEART RATE: 95 BPM | HEIGHT: 66 IN | OXYGEN SATURATION: 94 % | TEMPERATURE: 98.4 F | SYSTOLIC BLOOD PRESSURE: 116 MMHG

## 2018-01-22 VITALS
WEIGHT: 155.13 LBS | DIASTOLIC BLOOD PRESSURE: 78 MMHG | BODY MASS INDEX: 26.49 KG/M2 | HEIGHT: 64 IN | HEART RATE: 87 BPM | SYSTOLIC BLOOD PRESSURE: 128 MMHG

## 2018-01-22 VITALS
BODY MASS INDEX: 26.8 KG/M2 | SYSTOLIC BLOOD PRESSURE: 120 MMHG | DIASTOLIC BLOOD PRESSURE: 64 MMHG | WEIGHT: 157 LBS | OXYGEN SATURATION: 97 % | HEIGHT: 64 IN | HEART RATE: 86 BPM

## 2018-02-15 DIAGNOSIS — J44.9 CHRONIC OBSTRUCTIVE PULMONARY DISEASE, UNSPECIFIED COPD TYPE (HCC): Primary | ICD-10-CM

## 2018-03-07 NOTE — PROGRESS NOTES
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)   6  Tricuspid valve disorders, 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)    Immunizations  Influenza --- Amanda Agustin: [id number] Ashin-Oct-2014; Monster Vargas: 06-Oct-2015; Series4:  28-Oct-2016   PPSV --- Amanda Agustin: 2014   Tdap --- Amanda Agustin: CANT REMEMBER   Zoster --- Amanda Agustin: 2014     Current Meds   1  Advair Diskus 250-50 MCG/DOSE Inhalation Aerosol Powder Breath Activated; USE 1   PUFF TWICE A DAY   2  Aspirin EC 81 MG Oral Tablet Delayed Release; TAKE 1 TABLET DAILY   3  Atorvastatin Calcium 10 MG Oral Tablet; Take 1 tablet daily   4  BusPIRone HCl - 15 MG Oral Tablet; TAKE 1 TABLET TWICE DAILY   5  DiltiaZEM HCl ER Coated Beads 180 MG Oral Capsule Extended Release 24 Hour; take   1 capsule daily   6  Famotidine 20 MG Oral Tablet; Take 1 tablet twice daily   7  Lisinopril 20 MG Oral Tablet; Take 1 tablet daily   8  MetFORMIN HCl - 500 MG Oral Tablet; take 1 tablet by mouth twice a day   9  Omega 3 1000 MG Oral Capsule; TAKE 1 CAPSULE DAILY   10  OneTouch Delica Lancets Fine Miscellaneous   11  OneTouch Ultra Blue In Vitro Strip; TEST ONCE DAILY   12  ProAir  (90 Base) MCG/ACT Inhalation Aerosol Solution; INHALE 2 PUFFS    EVERY 4 HOURS AS NEEDED   13  PROzac 40 MG Oral Capsule; TAKE 1 CAPSULE DAILY   14  Temazepam 30 MG Oral Capsule; TAKE 1 CAPSULE AT BEDTIME    Allergies    1   No Known Drug Allergies    Future Appointments    Date/Time Provider Specialty Site   07/17/2017 08:00 AM MERI Hutson   Internal [address] [facility]     Signatures   Electronically signed by : MEIR Wood ; Feb 28 2017  6:06PM EST

## 2018-03-27 ENCOUNTER — APPOINTMENT (OUTPATIENT)
Dept: LAB | Facility: CLINIC | Age: 63
End: 2018-03-27
Payer: MEDICARE

## 2018-03-27 DIAGNOSIS — E78.5 HYPERLIPIDEMIA: ICD-10-CM

## 2018-03-27 DIAGNOSIS — E11.9 TYPE 2 DIABETES MELLITUS WITHOUT COMPLICATIONS (HCC): ICD-10-CM

## 2018-03-27 LAB
ALBUMIN SERPL BCP-MCNC: 3.6 G/DL (ref 3.5–5)
ALP SERPL-CCNC: 85 U/L (ref 46–116)
ALT SERPL W P-5'-P-CCNC: 12 U/L (ref 12–78)
ANION GAP SERPL CALCULATED.3IONS-SCNC: 7 MMOL/L (ref 4–13)
AST SERPL W P-5'-P-CCNC: 18 U/L (ref 5–45)
BASOPHILS # BLD AUTO: 0.02 THOUSANDS/ΜL (ref 0–0.1)
BASOPHILS NFR BLD AUTO: 0 % (ref 0–1)
BILIRUB SERPL-MCNC: 0.5 MG/DL (ref 0.2–1)
BUN SERPL-MCNC: 15 MG/DL (ref 5–25)
CALCIUM SERPL-MCNC: 8.8 MG/DL (ref 8.3–10.1)
CHLORIDE SERPL-SCNC: 104 MMOL/L (ref 100–108)
CHOLEST SERPL-MCNC: 148 MG/DL (ref 50–200)
CO2 SERPL-SCNC: 28 MMOL/L (ref 21–32)
CREAT SERPL-MCNC: 0.84 MG/DL (ref 0.6–1.3)
EOSINOPHIL # BLD AUTO: 0.3 THOUSAND/ΜL (ref 0–0.61)
EOSINOPHIL NFR BLD AUTO: 4 % (ref 0–6)
ERYTHROCYTE [DISTWIDTH] IN BLOOD BY AUTOMATED COUNT: 13 % (ref 11.6–15.1)
EST. AVERAGE GLUCOSE BLD GHB EST-MCNC: 140 MG/DL
GFR SERPL CREATININE-BSD FRML MDRD: 93 ML/MIN/1.73SQ M
GLUCOSE P FAST SERPL-MCNC: 119 MG/DL (ref 65–99)
HBA1C MFR BLD HPLC: 6.5 %
HBA1C MFR BLD: 6.5 % (ref 4.2–6.3)
HCT VFR BLD AUTO: 42.5 % (ref 36.5–49.3)
HDLC SERPL-MCNC: 55 MG/DL (ref 40–60)
HGB BLD-MCNC: 14 G/DL (ref 12–17)
LDLC SERPL CALC-MCNC: 71 MG/DL (ref 0–100)
LYMPHOCYTES # BLD AUTO: 2.81 THOUSANDS/ΜL (ref 0.6–4.47)
LYMPHOCYTES NFR BLD AUTO: 34 % (ref 14–44)
MCH RBC QN AUTO: 27.2 PG (ref 26.8–34.3)
MCHC RBC AUTO-ENTMCNC: 32.9 G/DL (ref 31.4–37.4)
MCV RBC AUTO: 83 FL (ref 82–98)
MONOCYTES # BLD AUTO: 0.7 THOUSAND/ΜL (ref 0.17–1.22)
MONOCYTES NFR BLD AUTO: 9 % (ref 4–12)
NEUTROPHILS # BLD AUTO: 4.39 THOUSANDS/ΜL (ref 1.85–7.62)
NEUTS SEG NFR BLD AUTO: 53 % (ref 43–75)
NRBC BLD AUTO-RTO: 0 /100 WBCS
PLATELET # BLD AUTO: 280 THOUSANDS/UL (ref 149–390)
PMV BLD AUTO: 10.9 FL (ref 8.9–12.7)
POTASSIUM SERPL-SCNC: 3.8 MMOL/L (ref 3.5–5.3)
PROT SERPL-MCNC: 7.5 G/DL (ref 6.4–8.2)
RBC # BLD AUTO: 5.15 MILLION/UL (ref 3.88–5.62)
SODIUM SERPL-SCNC: 139 MMOL/L (ref 136–145)
TRIGL SERPL-MCNC: 108 MG/DL
TSH SERPL DL<=0.05 MIU/L-ACNC: 2.07 UIU/ML (ref 0.36–3.74)
WBC # BLD AUTO: 8.24 THOUSAND/UL (ref 4.31–10.16)

## 2018-03-27 PROCEDURE — 85025 COMPLETE CBC W/AUTO DIFF WBC: CPT

## 2018-03-27 PROCEDURE — 84443 ASSAY THYROID STIM HORMONE: CPT

## 2018-03-27 PROCEDURE — 36415 COLL VENOUS BLD VENIPUNCTURE: CPT

## 2018-03-27 PROCEDURE — 80061 LIPID PANEL: CPT

## 2018-03-27 PROCEDURE — 83036 HEMOGLOBIN GLYCOSYLATED A1C: CPT

## 2018-03-27 PROCEDURE — 80053 COMPREHEN METABOLIC PANEL: CPT

## 2018-04-09 RX ORDER — FLUOXETINE HYDROCHLORIDE 40 MG/1
CAPSULE ORAL
COMMUNITY
Start: 2018-01-25 | End: 2018-04-10 | Stop reason: SDUPTHER

## 2018-04-09 RX ORDER — TEMAZEPAM 30 MG/1
1 CAPSULE ORAL
COMMUNITY
Start: 2014-05-14

## 2018-04-09 RX ORDER — DILTIAZEM HYDROCHLORIDE 180 MG/1
1 CAPSULE, EXTENDED RELEASE ORAL DAILY
COMMUNITY
Start: 2014-12-19 | End: 2018-12-04 | Stop reason: SDUPTHER

## 2018-04-09 RX ORDER — FLUOXETINE HYDROCHLORIDE 40 MG/1
1 CAPSULE ORAL DAILY
COMMUNITY
Start: 2014-05-14

## 2018-04-09 RX ORDER — ATORVASTATIN CALCIUM 10 MG/1
1 TABLET, FILM COATED ORAL
COMMUNITY
Start: 2014-05-14 | End: 2018-12-04 | Stop reason: SDUPTHER

## 2018-04-09 RX ORDER — BUSPIRONE HYDROCHLORIDE 15 MG/1
1 TABLET ORAL 2 TIMES DAILY
COMMUNITY
Start: 2014-05-14 | End: 2018-10-02

## 2018-04-09 RX ORDER — ASPIRIN 81 MG/1
1 TABLET ORAL DAILY
COMMUNITY
Start: 2014-05-14

## 2018-04-09 RX ORDER — LISINOPRIL 20 MG/1
1 TABLET ORAL DAILY
COMMUNITY
Start: 2014-10-06 | End: 2018-06-01 | Stop reason: SDUPTHER

## 2018-04-09 RX ORDER — ZOLPIDEM TARTRATE 10 MG/1
TABLET ORAL
COMMUNITY
Start: 2018-01-17

## 2018-04-10 ENCOUNTER — OFFICE VISIT (OUTPATIENT)
Dept: INTERNAL MEDICINE CLINIC | Facility: CLINIC | Age: 63
End: 2018-04-10
Payer: MEDICARE

## 2018-04-10 VITALS
HEART RATE: 81 BPM | HEIGHT: 65 IN | WEIGHT: 157.2 LBS | DIASTOLIC BLOOD PRESSURE: 80 MMHG | SYSTOLIC BLOOD PRESSURE: 124 MMHG | BODY MASS INDEX: 26.19 KG/M2 | RESPIRATION RATE: 16 BRPM | OXYGEN SATURATION: 97 %

## 2018-04-10 DIAGNOSIS — Z00.00 MEDICARE ANNUAL WELLNESS VISIT, SUBSEQUENT: Primary | ICD-10-CM

## 2018-04-10 DIAGNOSIS — F33.41 RECURRENT MAJOR DEPRESSIVE DISORDER, IN PARTIAL REMISSION (HCC): ICD-10-CM

## 2018-04-10 DIAGNOSIS — E11.9 TYPE 2 DIABETES MELLITUS WITHOUT COMPLICATION, WITHOUT LONG-TERM CURRENT USE OF INSULIN (HCC): ICD-10-CM

## 2018-04-10 DIAGNOSIS — E78.2 MIXED HYPERLIPIDEMIA: ICD-10-CM

## 2018-04-10 DIAGNOSIS — F41.1 GENERALIZED ANXIETY DISORDER: ICD-10-CM

## 2018-04-10 DIAGNOSIS — J44.9 CHRONIC OBSTRUCTIVE PULMONARY DISEASE, UNSPECIFIED COPD TYPE (HCC): ICD-10-CM

## 2018-04-10 DIAGNOSIS — I10 ESSENTIAL HYPERTENSION: ICD-10-CM

## 2018-04-10 PROBLEM — T78.40XA ALLERGIC REACTION: Status: ACTIVE | Noted: 2017-09-26

## 2018-04-10 PROBLEM — E66.3 OVERWEIGHT: Status: ACTIVE | Noted: 2017-11-27

## 2018-04-10 PROBLEM — F41.9 ANXIETY DISORDER: Status: ACTIVE | Noted: 2017-07-17

## 2018-04-10 PROCEDURE — G0439 PPPS, SUBSEQ VISIT: HCPCS | Performed by: INTERNAL MEDICINE

## 2018-04-10 PROCEDURE — 99214 OFFICE O/P EST MOD 30 MIN: CPT | Performed by: INTERNAL MEDICINE

## 2018-04-10 RX ORDER — BUSPIRONE HYDROCHLORIDE 10 MG/1
10 TABLET ORAL 2 TIMES DAILY
Refills: 3 | COMMUNITY
Start: 2018-04-03 | End: 2018-04-10

## 2018-04-10 NOTE — PROGRESS NOTES
HPI:  Saulo Walker is a 60 y o  male here for his Subsequent Wellness Visit      Patient Active Problem List   Diagnosis    Allergic reaction    Generalized anxiety disorder    Cardiac arrhythmia    Chronic obstructive pulmonary disease ([address] )    Recurrent major depressive disorder, in partial remission ([facility] [address] )    Diastolic dysfunction    Ebsteins anomaly    GERD without esophagitis    Mixed hyperlipidemia    Essential hypertension    Hypertensive heart disease    Nonrheumatic pulmonary valve insufficiency    Overweight    Tricuspid valve disorders, non-rheumatic    Type 2 diabetes mellitus, without long-term current use of insulin (HCC)     Past Medical History:   Diagnosis Date    Alcohol abuse     Allergic rhinitis     last assessed: 6/4/2014    Asthma     last assesssed: 6/4/2014    Atresia of esophagus with tracheo-esophageal fistula     Benign neoplasm of colon     Campylobacter enteritis     last assessed: 5/6/2015    Cardiac arrhythmia     last assessed: 6/19/2015    Chronic cough     last assessed: 1/29/2015    Chronic fatigue syndrome     resolved: 6/4/2014    Chronic sinusitis     resolved: 6/4/2014    Digestive symptom     EKG, abnormal     Epistaxis     Generalized osteoarthritis     resolved: 6/4/2014    Myalgia     Myositis     Poisoning by drug or medicinal substance      Past Surgical History:   Procedure Laterality Date    TONSILLECTOMY       Family History   Problem Relation Age of Onset    Asthma Mother      History   Smoking Status    Former Smoker   Smokeless Tobacco    Never Used     Comment: Quit 12 years     History   Alcohol Use No      History   Drug Use No     /80   Pulse 81   Resp 16   Ht 5' 5" (1 651 m)   Wt 71 3 kg (157 lb 3 2 oz)   SpO2 97%   BMI 26 16 kg/m²       Current Outpatient Prescriptions   Medication Sig Dispense Refill    aspirin (ECOTRIN LOW STRENGTH) 81 mg EC tablet Take 1 tablet by mouth daily      atorvastatin (LIPITOR) 10 mg tablet Take 1 tablet by mouth      busPIRone (BUSPAR) 15 mg tablet Take 1 tablet by mouth 2 (two) times a day      diltiazem (TIAZAC) 180 MG 24 hr capsule Take 1 capsule by mouth daily      FLUoxetine (PROZAC) 40 MG capsule Take 1 capsule by mouth daily      fluticasone-salmeterol (ADVAIR DISKUS) 250-50 mcg/dose inhaler Inhale 1 puff 2 (two) times a day      lisinopril (ZESTRIL) 20 mg tablet Take 1 tablet by mouth daily      metFORMIN (GLUCOPHAGE) 500 mg tablet Take 1 tablet by mouth 2 (two) times a day      ONETOUCH DELICA LANCETS FINE MISC by Does not apply route      temazepam (RESTORIL) 30 mg capsule Take 1 capsule by mouth      VENTOLIN  (90 Base) MCG/ACT inhaler INHALE 2 PUFFS BY MOUTH EVERY 4 HOURS AS NEEDED 1 Inhaler 3    zolpidem (AMBIEN) 10 mg tablet        No current facility-administered medications for this visit        No Known Allergies  Immunization History   Administered Date(s) Administered    Influenza Quadrivalent, 6-35 Months IM 10/06/2014, 10/28/2016, 09/08/2017    Influenza TIV (IM) 10/21/2013, 10/06/2015    Pneumococcal Polysaccharide PPV23 06/04/2014, 02/27/2017    Tdap 1955    Zoster 01/01/2014       Patient Care Team:  Yamile Vo MD as PCP - General      Medicare Screening Tests and Risk Assessments:  AWV Clinical     ISAR:   Previous hospitalizations?:  No       Once in a Lifetime Medicare Screening:   AAA screening performed? (if performed, please add date to Health Maintenance):  No       Medicare Screening Tests and Risk Assessment:   AAA Risk Assessment     Family history of AAA:  No   Osteoporosis Risk Assessment    :  Yes    Age over 48:  Yes    HIV Risk Assessment        Drug and Alcohol Use:   Tobacco use    Cigarettes:  former smoker    Quit date:  1/1/06   Tobacco use duration    Tobacco Cessation Readiness    Alcohol use    Alcohol use:  rare use    Alcohol Treatment Readiness   Illicit Drug Use    Drug use:  never        Diet & Exercise:   Diet   What is your diet?:  Regular   How many servings a day of the following:   Fruits and Vegetables:  1-2 Meat:  1-2   Whole Grains:  1    Dairy:  1     Tea:  2   Exercise    Do you currently exercise?:  yes    Frequency:  daily    Minutes per day:  40   Times per week:  3 Minutes per week:  120       Cognitive Impairment Screening:   Cognitive Impairment Screening        Functional Ability/Level of Safety:   Hearing     Bilateral:  normal   Hearing aid:  No    Hearing Impairment Assessment    Current Activities    Status:  limited ADL's, limited social activities, unlimited driving   Help needed with the folllowing:    ADL    Fall Risk   Injury History       Home Safety:   Home Safety Risk Factors       Advanced Directives:   Advanced Directives    Living Will:  No Durable POA for healthcare: Yes   Advanced directive:  No    Patient's End of Life Decisions        Urinary Incontinence:       Glaucoma:            Provider Screening     Preventative Screening/Counseling:   Cardiovascular Screening/Counseling:   (Labs Q5 years, EKG optional one-time)   General:  Risks and Benefits Discussed           Diabetes Screening/Counseling:   (2 tests/year if Pre-Diabetes or 1 test/year if no Diabetes)   General:  Risks and Benefits Discussed           Colorectal Cancer Screening/Counseling:   (FOBT Q1 yr; Flex Sig Q4 yrs or Q10 yrs after Screening Colonoscopy; Screening Colonoscpy Q2 yrs High Risk or Q10 yrs Low Risk; Barium Enema Q2 yrs High Risk or Q4 yrs Low Risk)   General:  Screening Current           Prostate Cancer Screening/Counseling:   (Annual)    General:  Screening Current          Breast Cancer Screening/Counseling:   (Baseline Age 28 - 43; Annual Age 36+)         Cervical Cancer Screening/Counseling:   (Annual for High Risk or Childbearing Age with Abnormal Pap in Last 3 yrs;  Every 2 all others)         Osteoporosis Screening/Counseling:   (Every 2 Yrs if at risk or more if medically necessary) AAA Screening/Counseling:   (Once per Lifetime with risk factors)    Family History of AAA:  No           Glaucoma Screening/Counseling:   (Annual)         HIV Screening/Counseling:   (Voluntary; Once annually for high risk OR 3 times for Pregnancy at diagnosis of IUP; 3rd trimester; and at Labor         Hepatitis C Screening:             Immunizations: Other Preventative Couseling (Non-Medicare Wellness Visit Required):       Referrals (Non-Medicare Wellness Visit Required):       Medical Equipment/Suppliers:           No exam data present  [facility]ed Updated [facility] Prior Wellness Visits:   Last Medicare wellness visit information was reviewed, patient interviewed , no change since last AWVyes  Last Medicare wellness visit information was reviewed, patient interviewed and updates made to the record today yes    Assessment and Plan:  1  Medicare annual wellness visit, subsequent     2  Type 2 diabetes mellitus without complication, without long-term current use of insulin (HCC)  CBC and differential    Comprehensive metabolic panel    HEMOGLOBIN A1C W/ EAG ESTIMATION    Lipid panel    TSH, 3rd generation    Microalbumin / creatinine urine ratio   3  Essential hypertension     4  Mixed hyperlipidemia  Lipid panel   5  Generalized anxiety disorder     6  Chronic obstructive pulmonary disease, unspecified COPD type ([facility] [address] )     7   Recurrent major depressive disorder, in partial remission [facility])         Health Maintenance Due   Topic Date Due    HIV SCREENING  1955    Hepatitis C Screening  1955    DTaP,Tdap,and Td Vaccines (1 - Tdap) 05/05/1975    Diabetic Foot Exam  06/19/2016

## 2018-04-10 NOTE — PATIENT INSTRUCTIONS
Wellness Visit for Adults   WHAT YOU NEED TO KNOW:   What is a wellness visit? A wellness visit is when you see your healthcare provider to get screened for health problems  You can also get advice on how to stay healthy  Write down your questions so you remember to ask them  Ask your healthcare provider how often you should have a wellness visit  What happens at a wellness visit? Your healthcare provider will ask about your health, and your family history of health problems  This includes high blood pressure, heart disease, and cancer  He or she will ask if you have symptoms that concern you, if you smoke, and about your mood  You may also be asked about your intake of medicines, supplements, food, and alcohol  Any of the following may be done:  · Your weight  will be checked  Your height may also be checked so your body mass index (BMI) can be calculated  Your BMI shows if you are at a healthy weight  · Your blood pressure  and heart rate will be checked  Your temperature may also be checked  · Blood and urine tests  may be done  Blood tests may be done to check your cholesterol levels  Abnormal cholesterol levels increase your risk for heart disease and stroke  You may also need a blood or urine test to check for diabetes if you are at increased risk  Urine tests may be done to look for signs of an infection or kidney disease  · A physical exam  includes checking your heartbeat and lungs with a stethoscope  Your healthcare provider may also check your skin to look for sun damage  · Screening tests  may be recommended  A screening test is done to check for diseases that may not cause symptoms  The screening tests you may need depend on your age, gender, family history, and lifestyle habits  For example, colorectal screening may be recommended if you are 48years old or older  What screening tests do I need if I am a woman? · A Pap smear  is used to screen for cervical cancer   Pap smears are usually done every 3 to 5 years depending on your age  You may need them more often if you have had abnormal Pap smear test results in the past  Ask your healthcare provider how often you should have a Pap smear  · A mammogram  is an x-ray of your breasts to screen for breast cancer  Experts recommend mammograms every 2 years starting at age 48 years  You may need a mammogram at age 52 years or younger if you have an increased risk for breast cancer  Talk to your healthcare provider about when you should start having mammograms and how often you need them  What vaccines might I need? · Get an influenza vaccine  every year  The influenza vaccine protects you from the flu  Several types of viruses cause the flu  The viruses change over time, so new vaccines are made each year  · Get a tetanus-diphtheria (Td) booster vaccine  every 10 years  This vaccine protects you against tetanus and diphtheria  Tetanus is a severe infection that may cause painful muscle spasms and lockjaw  Diphtheria is a severe bacterial infection that causes a thick covering in the back of your mouth and throat  · Get a human papillomavirus (HPV) vaccine  if you are female and aged 23 to 32 or male 23 to 24 and never received it  This vaccine protects you from HPV infection  HPV is the most common infection spread by sexual contact  HPV may also cause vaginal, penile, and anal cancers  · Get a pneumococcal vaccine  if you are aged 72 years or older  The pneumococcal vaccine is an injection given to protect you from pneumococcal disease  Pneumococcal disease is an infection caused by pneumococcal bacteria  The infection may cause pneumonia, meningitis, or an ear infection  · Get a shingles vaccine  if you are aged 61 or older, even if you have had shingles before  The shingles vaccine is an injection to protect you from the varicella-zoster virus  This is the same virus that causes chickenpox   Shingles is a painful rash that develops in people who had chickenpox or have been exposed to the virus  How can I eat healthy? My Plate is a model for planning healthy meals  It shows the types and amounts of foods that should go on your plate  Fruits and vegetables make up about half of your plate, and grains and protein make up the other half  A serving of dairy is included on the side of your plate  The amount of calories and serving sizes you need depends on your age, gender, weight, and height  Examples of healthy foods are listed below:  · Eat a variety of vegetables  such as dark green, red, and orange vegetables  You can also include canned vegetables low in sodium (salt) and frozen vegetables without added butter or sauces  · Eat a variety of fresh fruits , canned fruit in 100% juice, frozen fruit, and dried fruit  · Include whole grains  At least half of the grains you eat should be whole grains  Examples include whole-wheat bread, wheat pasta, brown rice, and whole-grain cereals such as oatmeal     · Eat a variety of protein foods such as seafood (fish and shellfish), lean meat, and poultry without skin (turkey and chicken)  Examples of lean meats include pork leg, shoulder, or tenderloin, and beef round, sirloin, tenderloin, and extra lean ground beef  Other protein foods include eggs and egg substitutes, beans, peas, soy products, nuts, and seeds  · Choose low-fat dairy products such as skim or 1% milk or low-fat yogurt, cheese, and cottage cheese  · Limit unhealthy fats  such as butter, hard margarine, and shortening  How much exercise do I need? Exercise at least 30 minutes per day on most days of the week  Some examples of exercise include walking, biking, dancing, and swimming  You can also fit in more physical activity by taking the stairs instead of the elevator or parking farther away from stores  Include muscle strengthening activities 2 days each week  Regular exercise provides many health benefits  It helps you manage your weight, and decreases your risk for type 2 diabetes, heart disease, stroke, and high blood pressure  Exercise can also help improve your mood  Ask your healthcare provider about the best exercise plan for you  What are some general health and safety guidelines I should follow? · Do not smoke  Nicotine and other chemicals in cigarettes and cigars can cause lung damage  Ask your healthcare provider for information if you currently smoke and need help to quit  E-cigarettes or smokeless tobacco still contain nicotine  Talk to your healthcare provider before you use these products  · Limit alcohol  A drink of alcohol is 12 ounces of beer, 5 ounces of wine, or 1½ ounces of liquor  · Lose weight, if needed  Being overweight increases your risk of certain health conditions  These include heart disease, high blood pressure, type 2 diabetes, and certain types of cancer  · Protect your skin  Do not sunbathe or use tanning beds  Use sunscreen with a SPF 15 or higher  Apply sunscreen at least 15 minutes before you go outside  Reapply sunscreen every 2 hours  Wear protective clothing, hats, and sunglasses when you are outside  · Drive safely  Always wear your seatbelt  Make sure everyone in your car wears a seatbelt  A seatbelt can save your life if you are in an accident  Do not use your cell phone when you are driving  This could distract you and cause an accident  Pull over if you need to make a call or send a text message  · Practice safe sex  Use latex condoms if are sexually active and have more than one partner  Your healthcare provider may recommend screening tests for sexually transmitted infections (STIs)  · Wear helmets, lifejackets, and protective gear  Always wear a helmet when you ride a bike or motorcycle, go skiing, or play sports that could cause a head injury  Wear protective equipment when you play sports   Wear a lifejacket when you are on a boat or doing water sports  CARE AGREEMENT:   You have the right to help plan your care  Learn about your health condition and how it may be treated  Discuss treatment options with your caregivers to decide what care you want to receive  You always have the right to refuse treatment  The above information is an  only  It is not intended as medical advice for individual conditions or treatments  Talk to your doctor, nurse or pharmacist before following any medical regimen to see if it is safe and effective for you  © 2017 [employer] Information is for End User's use only and may not be sold, redistributed or otherwise used for commercial purposes  All illustrations and images included in CareNotes® are the copyrighted property of A [employer] M [employer]  or Krystian Mccarthy

## 2018-04-10 NOTE — PROGRESS NOTES
INTERNAL MEDICINE OFFICE VISIT  [facility] AT [address]  Tel: [phone]      NAME: Jair Estrada  AGE: 60 y o  SEX: male  : 1955   MRN: [id number]    DATE: 4/10/2018  TIME: 10:43 AM      Assessment and Plan:  1  Medicare annual wellness visit, subsequent      2  Type 2 diabetes mellitus without complication, without long-term current use of insulin (HCC)  Well controlled, continue same medication  A diabetic foot exam was done today in the office     - CBC and differential; Future  - Comprehensive metabolic panel; Future  - HEMOGLOBIN A1C W/ EAG ESTIMATION; Future  - Lipid panel; Future  - TSH, 3rd generation; Future  - Microalbumin / creatinine urine ratio; Future    3  Essential hypertension    Well controlled on present medication, continue the same    4  Mixed hyperlipidemia    Takes atorvastatin regularly, continue the same   - Lipid panel; Future    5  Generalized anxiety disorder  Takes BuSpar with control of symptoms  Continue the same  6  Chronic obstructive pulmonary disease, unspecified COPD type (HCC)  Stable on inhalers    7  Recurrent major depressive disorder, in partial remission ([facility] [address] )  Continue fluoxetine      - Counseling Documentation: patient was counseled regarding: diagnostic results, instructions for management, risk factor reductions, prognosis, patient and family education, impressions, risks and benefits of treatment options and importance of compliance with treatment  - Medication Side Effects: Adverse side effects of medications were reviewed with the patient/guardian today  Return for follow up visit in 4 months or earlier, if needed  Chief Complaint:  Chief Complaint   Patient presents with    Medicare Wellness Visit         History of Present Illness:   Type 2 diabetes-well controlled on present medications  Hypertension-controlled, continue medications    Hyperlipidemia-takes atorvastatin regularly  Anxiety and depression-stable on medications  COPD-continue inhalers        Active Problem List:  Patient Active Problem List   Diagnosis    Allergic reaction    Generalized anxiety disorder    Cardiac arrhythmia    Chronic obstructive pulmonary disease ([facility] [address] )    Recurrent major depressive disorder, in partial remission ([facility] [address] )    Diastolic dysfunction    Ebsteins anomaly    GERD without esophagitis    Mixed hyperlipidemia    Essential hypertension    Hypertensive heart disease    Nonrheumatic pulmonary valve insufficiency    Overweight    Tricuspid valve disorders, non-rheumatic    Type 2 diabetes mellitus, without long-term current use of insulin (HCC)         Past Medical History:  Past Medical History:   Diagnosis Date    Alcohol abuse     Allergic rhinitis     last assessed: 6/4/2014    Asthma     last assesssed: 6/4/2014    Atresia of esophagus with tracheo-esophageal fistula     Benign neoplasm of colon     Campylobacter enteritis     last assessed: 5/6/2015    Cardiac arrhythmia     last assessed: 6/19/2015    Chronic cough     last assessed: 1/29/2015    Chronic fatigue syndrome     resolved: 6/4/2014    Chronic sinusitis     resolved: 6/4/2014    Digestive symptom     EKG, abnormal     Epistaxis     Generalized osteoarthritis     resolved: 6/4/2014    Myalgia     Myositis     Poisoning by drug or medicinal substance          Past Surgical History:  Past Surgical History:   Procedure Laterality Date    TONSILLECTOMY           Family History:  Family History   Problem Relation Age of Onset    Asthma Mother          Social History:  Social History     Social History    Marital status: Single     Spouse name: N/A    Number of children: N/A    Years of education: N/A     Social History Main Topics    Smoking status: Former Smoker    Smokeless tobacco: Never Used      Comment: Quit 12 years    Alcohol use No    Drug use: No    Sexual activity: Yes Partners: Female     Other Topics Concern    None     Social History Narrative    None         Allergies:  No Known Allergies      Medications:    Current Outpatient Prescriptions:     aspirin (ECOTRIN LOW STRENGTH) 81 mg EC tablet, Take 1 tablet by mouth daily, Disp: , Rfl:     atorvastatin (LIPITOR) 10 mg tablet, Take 1 tablet by mouth, Disp: , Rfl:     busPIRone (BUSPAR) 15 mg tablet, Take 1 tablet by mouth 2 (two) times a day, Disp: , Rfl:     diltiazem (TIAZAC) 180 MG 24 hr capsule, Take 1 capsule by mouth daily, Disp: , Rfl:     FLUoxetine (PROZAC) 40 MG capsule, Take 1 capsule by mouth daily, Disp: , Rfl:     fluticasone-salmeterol (ADVAIR DISKUS) 250-50 mcg/dose inhaler, Inhale 1 puff 2 (two) times a day, Disp: , Rfl:     lisinopril (ZESTRIL) 20 mg tablet, Take 1 tablet by mouth daily, Disp: , Rfl:     metFORMIN (GLUCOPHAGE) 500 mg tablet, Take 1 tablet by mouth 2 (two) times a day, Disp: , Rfl:     ONETOUCH DELICA LANCETS FINE MISC, by Does not apply route, Disp: , Rfl:     temazepam (RESTORIL) 30 mg capsule, Take 1 capsule by mouth, Disp: , Rfl:     VENTOLIN  (90 Base) MCG/ACT inhaler, INHALE 2 PUFFS BY MOUTH EVERY 4 HOURS AS NEEDED, Disp: 1 Inhaler, Rfl: 3    zolpidem (AMBIEN) 10 mg tablet, , Disp: , Rfl:       The following portions of the patient's history were reviewed and updated as appropriate: past medical history, past surgical history, family history, social history, allergies, current medications and active problem list       Review of Systems:  Constitutional: Denies fever, chills, weight gain, weight loss, fatigue  Eyes: Denies eye redness, eye discharge, double vision, change in visual acuity  ENT: Denies hearing loss, tinnitus, sneezing, nasal congestion, nasal discharge, sore throat   Respiratory: Denies cough, expectoration, hemoptysis, shortness of breath, wheezing  Cardiovascular: Denies chest pain, palpitations, lower extremity swelling, orthopnea, PND  Gastrointestinal: Denies abdominal pain, heartburn, nausea, vomiting, hematemesis, diarrhea, bloody stools  Genito-Urinary: Denies dysuria, frequency, difficulty in micturition, nocturia, incontinence  Musculoskeletal: Denies back pain, joint pain, muscle pain  Neurologic: Denies confusion, lightheadedness, syncope, headache, focal weakness, sensory changes, seizures  Endocrine: Denies polyuria, polydipsia, temperature intolerance  Allergy and Immunology: Denies hives, insect bite sensitivity  Hematological and Lymphatic: Denies bleeding problems, swollen glands   Psychological: Denies depression, suicidal ideation, anxiety, panic, mood swings  Dermatological: Denies pruritus, rash, skin lesion changes      Vitals:  Vitals:    04/10/18 0959   BP: 124/80   Pulse: 81   Resp: 16   SpO2: 97%       Body mass index is 26 16 kg/m²  Weight (last 2 days)     Date/Time   Weight    04/10/18 0959  71 3 (157 2)                Physical Examination:  General: Patient is not in acute distress  Awake, alert, responding to commands  No weight gain or loss  Head: Normocephalic  Atraumatic  Eyes: Conjunctiva and lids with no swelling, erythema or discharge  Both pupils normal sized, round and reactive to light  Sclera nonicteric  ENT: External examination of nose and ear normal  Otoscopic examination shows translucent tympanic membranes with patent canals without erythema  Oropharynx moist with no erythema, edema, exudate or lesions  Neck: Supple  JVP not raised  Trachea midline  No masses  No thyromegaly  Lungs: No signs of increased work of breathing or respiratory distress  Bilateral bronchovascular breath sounds with no crackles or rhonchi  Chest wall: No tenderness  Cardiovascular: Normal PMI  No thrills  Regular rate and rhythm  S1 and S2 normal  No murmur, rub or gallop  Gastrointestinal: Abdomen soft, nontender  No guarding or rigidity  Liver and spleen not palpable   Bowel sounds present  Neurologic: Cranial nerves II-XII intact  Cortical functions normal  Motor system - Reflexes 2+ and symmetrical  Sensations normal  Musculoskeletal: Gait normal  No joint tenderness  Integumentary: Skin normal with no rash or lesions  Lymphatic: No palpable lymph nodes in neck, axilla or groin  Extremities: No clubbing, cyanosis, edema or varicosities  Psychological: Judgement and insight normal  Mood and affect normal    Patient's shoes and socks removed  Right Foot/Ankle   Right Foot Inspection  Skin Exam: skin normal and skin intact no dry skin, no warmth, no callus, no erythema, no maceration, no abnormal color, no pre-ulcer, no ulcer and no callus                          Toe Exam: ROM and strength within normal limits  Sensory     Proprioception: diminished and intact   Monofilament testing: intact  Vascular  Capillary refills: < 3 seconds  The right DP pulse is 2+  The right PT pulse is 2+  Left Foot/Ankle  Left Foot Inspection  Skin Exam: skin normal and skin intactno dry skin, no warmth, no erythema, no maceration, normal color, no pre-ulcer, no ulcer and no callus                         Toe Exam: ROM and strength within normal limits                   Sensory     Proprioception: intact  Monofilament: intact  Vascular  Capillary refills: < 3 seconds  The left DP pulse is 2+  The left PT pulse is 2+  Assign Risk Category:  No deformity present; No loss of protective sensation;  No weak pulses       Risk: 0  Laboratory Results:  CBC with diff:   Lab Results   Component Value Date    WBC 8 24 03/27/2018    WBC 8 33 10/19/2015    RBC 5 15 03/27/2018    RBC 5 46 10/19/2015    HGB 14 0 03/27/2018    HGB 14 8 10/19/2015    HCT 42 5 03/27/2018    HCT 44 9 10/19/2015    MCV 83 03/27/2018    MCV 82 10/19/2015    MCH 27 2 03/27/2018    MCH 27 1 10/19/2015    RDW 13 0 03/27/2018    RDW 13 0 10/19/2015     03/27/2018     10/19/2015       CMP:  Lab Results   Component Value Date    CREATININE 0 84 03/27/2018    CREATININE 0 85 10/19/2015    BUN 15 03/27/2018    BUN 10 10/19/2015     03/27/2018     10/19/2015    K 3 8 03/27/2018    K 3 6 10/19/2015     03/27/2018     10/19/2015    CO2 28 03/27/2018    CO2 27 10/19/2015    GLUCOSE 88 01/26/2017    GLUCOSE 142 (H) 10/19/2015    PROT 7 5 03/27/2018    PROT 7 3 10/19/2015    ALKPHOS 85 03/27/2018    ALKPHOS 87 10/19/2015    ALT 12 03/27/2018    ALT 17 10/19/2015    AST 18 03/27/2018    AST 17 10/19/2015       Lab Results   Component Value Date    HGBA1C 6 5 (H) 03/27/2018    HGBA1C 6 7 (H) 10/19/2015       No results found for: TROPONINI, CKMB, CKTOTAL    Lipid Profile:   Lab Results   Component Value Date    CHOL 148 03/27/2018    CHOL 142 11/17/2017     Lab Results   Component Value Date    HDL 55 03/27/2018    HDL 63 (H) 11/17/2017     Lab Results   Component Value Date    LDLCALC 71 03/27/2018    LDLCALC 51 11/17/2017     Lab Results   Component Value Date    TRIG 108 03/27/2018    TRIG 142 11/17/2017         Health Maintenance:  Health Maintenance   Topic Date Due    HIV SCREENING  1955    Hepatitis C Screening  1955    DTaP,Tdap,and Td Vaccines (1 - Tdap) 05/05/1975    Diabetic Foot Exam  06/19/2016    URINE MICROALBUMIN  06/27/2018    OPHTHALMOLOGY EXAM  06/29/2018    Depression Screening PHQ-9  04/10/2019    COLONOSCOPY  06/10/2025    INFLUENZA VACCINE  Completed    PNEUMOCOCCAL POLYSACCHARIDE VACCINE AGE 2-64 HIGH RISK  Completed     Immunization History   Administered Date(s) Administered    Influenza Quadrivalent, 6-35 Months IM 10/06/2014, 10/28/2016, 09/08/2017    Influenza TIV (IM) 10/21/2013, 10/06/2015    Pneumococcal Polysaccharide PPV23 06/04/2014, 02/27/2017    Tdap 1955    Zoster 01/01/2014         Mercedes Carter MD  4/10/2018,10:43 AM

## 2018-06-01 DIAGNOSIS — I10 ESSENTIAL HYPERTENSION: Primary | ICD-10-CM

## 2018-06-01 RX ORDER — LISINOPRIL 20 MG/1
TABLET ORAL
Qty: 90 TABLET | Refills: 0 | Status: SHIPPED | OUTPATIENT
Start: 2018-06-01 | End: 2018-08-28 | Stop reason: SDUPTHER

## 2018-06-25 ENCOUNTER — OFFICE VISIT (OUTPATIENT)
Dept: INTERNAL MEDICINE CLINIC | Facility: CLINIC | Age: 63
End: 2018-06-25
Payer: MEDICARE

## 2018-06-25 VITALS
WEIGHT: 153.4 LBS | SYSTOLIC BLOOD PRESSURE: 130 MMHG | BODY MASS INDEX: 26.19 KG/M2 | DIASTOLIC BLOOD PRESSURE: 90 MMHG | HEART RATE: 75 BPM | OXYGEN SATURATION: 96 % | HEIGHT: 64 IN

## 2018-06-25 DIAGNOSIS — M54.50 ACUTE RIGHT-SIDED LOW BACK PAIN WITHOUT SCIATICA: Primary | ICD-10-CM

## 2018-06-25 PROCEDURE — 99213 OFFICE O/P EST LOW 20 MIN: CPT | Performed by: INTERNAL MEDICINE

## 2018-06-25 RX ORDER — BUSPIRONE HYDROCHLORIDE 10 MG/1
10 TABLET ORAL 2 TIMES DAILY
Refills: 3 | COMMUNITY
Start: 2018-05-29

## 2018-06-25 RX ORDER — METHOCARBAMOL 500 MG/1
500 TABLET, FILM COATED ORAL 2 TIMES DAILY
Qty: 20 TABLET | Refills: 0 | Status: SHIPPED | OUTPATIENT
Start: 2018-06-25 | End: 2018-10-02

## 2018-06-25 RX ORDER — NAPROXEN 500 MG/1
500 TABLET ORAL 2 TIMES DAILY WITH MEALS
Qty: 20 TABLET | Refills: 0 | Status: SHIPPED | OUTPATIENT
Start: 2018-06-25 | End: 2018-07-19

## 2018-06-25 NOTE — PROGRESS NOTES
INTERNAL MEDICINE FOLLOW-UP OFFICE VISIT  [facility] AT [facility]    NAME: Anil Hassan  AGE: 58 y o  SEX: male  : 1955   MRN: [id number]    DATE: 2018  TIME: 11:51 AM    Assessment and Plan     Diagnoses and all orders for this visit:    Acute right-sided low back pain without sciatica  -     naproxen (NAPROSYN) 500 mg tablet; Take 1 tablet (500 mg total) by mouth 2 (two) times a day with meals  -     methocarbamol (ROBAXIN) 500 mg tablet; Take 1 tablet (500 mg total) by mouth 2 (two) times a day    Other orders  -     busPIRone (BUSPAR) 10 mg tablet; Take 10 mg by mouth 2 (two) times a day        - Counseling Documentation: patient was counseled regarding: instructions for management, risk factor reductions, prognosis, patient and family education and impressions  - Medication Side Effects: Adverse side effects of medications were reviewed with the patient/guardian today  Return to office in: as needed    Chief Complaint     Chief Complaint   Patient presents with    Pain     Right side       History of Present Illness     Back Pain   This is a new problem  The current episode started yesterday  The problem occurs constantly  The problem has been gradually worsening since onset  The pain is present in the lumbar spine  The pain does not radiate  The pain is at a severity of 7/10  The pain is moderate  The pain is the same all the time  The symptoms are aggravated by bending and twisting  Pertinent negatives include no abdominal pain, chest pain, dysuria, fever, headaches, numbness or weakness  He has tried nothing for the symptoms  The following portions of the patient's history were reviewed and updated as appropriate: allergies, current medications, past family history, past medical history, past social history, past surgical history and problem list     Review of Systems     Review of Systems   Constitutional: Negative for chills, diaphoresis, fatigue and fever  HENT: Negative for congestion, ear discharge, ear pain, hearing loss, postnasal drip, rhinorrhea, sinus pain, sinus pressure, sneezing, sore throat and voice change  Eyes: Negative for pain, discharge, redness and visual disturbance  Respiratory: Negative for cough, chest tightness, shortness of breath and wheezing  Cardiovascular: Negative for chest pain, palpitations and leg swelling  Gastrointestinal: Negative for abdominal distention, abdominal pain, blood in stool, constipation, diarrhea, nausea and vomiting  Endocrine: Negative for cold intolerance, heat intolerance, polydipsia, polyphagia and polyuria  Genitourinary: Negative for dysuria, flank pain, frequency, hematuria and urgency  Musculoskeletal: Positive for back pain  Negative for arthralgias, gait problem, joint swelling, myalgias, neck pain and neck stiffness  Skin: Negative for rash  Neurological: Negative for dizziness, tremors, syncope, facial asymmetry, speech difficulty, weakness, light-headedness, numbness and headaches  Hematological: Does not bruise/bleed easily  Psychiatric/Behavioral: Negative for behavioral problems, confusion and sleep disturbance  The patient is not nervous/anxious          Active Problem List     Patient Active Problem List   Diagnosis    Allergic reaction    Generalized anxiety disorder    Cardiac arrhythmia    Chronic obstructive pulmonary disease (HCC)    Recurrent major depressive disorder, in partial remission ([facility] [address] )    Diastolic dysfunction    Ebsteins anomaly    GERD without esophagitis    Mixed hyperlipidemia    Essential hypertension    Hypertensive heart disease    Nonrheumatic pulmonary valve insufficiency    Overweight    Tricuspid valve disorders, non-rheumatic    Type 2 diabetes mellitus, without long-term current use of insulin ([facility])       Objective     /90   Pulse 75   Ht 5' 4" (1 626 m)   Wt 69 6 kg (153 lb 6 4 oz)   SpO2 96%   BMI 26 33 kg/m² Physical Exam   Constitutional: He is oriented to person, place, and time  He appears well-developed and well-nourished  No distress  HENT:   Head: Normocephalic and atraumatic  Right Ear: External ear normal    Left Ear: External ear normal    Nose: Nose normal    Mouth/Throat: Oropharynx is clear and moist    Eyes: Conjunctivae and EOM are normal  Right eye exhibits no discharge  Left eye exhibits no discharge  No scleral icterus  Neck: Normal range of motion  Neck supple  No JVD present  No tracheal deviation present  No thyromegaly present  Cardiovascular: Normal rate, regular rhythm, normal heart sounds and intact distal pulses  Exam reveals no gallop and no friction rub  No murmur heard  Pulmonary/Chest: Effort normal and breath sounds normal  No respiratory distress  He has no wheezes  He has no rales  He exhibits no tenderness  Abdominal: Soft  Bowel sounds are normal  He exhibits no distension  There is no tenderness  There is no rebound and no guarding  Musculoskeletal: Normal range of motion  He exhibits tenderness  He exhibits no edema  Tenderness in right lumbar area   Lymphadenopathy:     He has no cervical adenopathy  Neurological: He is alert and oriented to person, place, and time  No cranial nerve deficit  He exhibits normal muscle tone  Coordination normal    Skin: Skin is warm and dry  No rash noted  He is not diaphoretic  No erythema  Psychiatric: He has a normal mood and affect   Judgment normal            Current Medications       Current Outpatient Prescriptions:     aspirin (ECOTRIN LOW STRENGTH) 81 mg EC tablet, Take 1 tablet by mouth daily, Disp: , Rfl:     atorvastatin (LIPITOR) 10 mg tablet, Take 1 tablet by mouth, Disp: , Rfl:     busPIRone (BUSPAR) 10 mg tablet, Take 10 mg by mouth 2 (two) times a day, Disp: , Rfl: 3    diltiazem (TIAZAC) 180 MG 24 hr capsule, Take 1 capsule by mouth daily, Disp: , Rfl:     FLUoxetine (PROZAC) 40 MG capsule, Take 1 capsule by mouth daily, Disp: , Rfl:     fluticasone-salmeterol (ADVAIR DISKUS) 250-50 mcg/dose inhaler, Inhale 1 puff 2 (two) times a day, Disp: , Rfl:     lisinopril (ZESTRIL) 20 mg tablet, TAKE 1 TABLET DAILY, Disp: 90 tablet, Rfl: 0    metFORMIN (GLUCOPHAGE) 500 mg tablet, Take 1 tablet by mouth 2 (two) times a day, Disp: , Rfl:     ONETOUCH DELICA LANCETS FINE MISC, by Does not apply route, Disp: , Rfl:     temazepam (RESTORIL) 30 mg capsule, Take 1 capsule by mouth, Disp: , Rfl:     VENTOLIN  (90 Base) MCG/ACT inhaler, INHALE 2 PUFFS BY MOUTH EVERY 4 HOURS AS NEEDED, Disp: 1 Inhaler, Rfl: 3    zolpidem (AMBIEN) 10 mg tablet, , Disp: , Rfl:     busPIRone (BUSPAR) 15 mg tablet, Take 1 tablet by mouth 2 (two) times a day, Disp: , Rfl:     methocarbamol (ROBAXIN) 500 mg tablet, Take 1 tablet (500 mg total) by mouth 2 (two) times a day, Disp: 20 tablet, Rfl: 0    naproxen (NAPROSYN) 500 mg tablet, Take 1 tablet (500 mg total) by mouth 2 (two) times a day with meals, Disp: 20 tablet, Rfl: 0    Health Maintenance     Health Maintenance   Topic Date Due    HIV SCREENING  1955    Hepatitis C Screening  1955    DTaP,Tdap,and Td Vaccines (1 - Tdap) 05/05/1975    URINE MICROALBUMIN  06/27/2018    OPHTHALMOLOGY EXAM  06/29/2018    INFLUENZA VACCINE  09/01/2018    HEMOGLOBIN A1C  09/27/2018    Annual [facility] (AWV)  04/10/2019    Diabetic Foot Exam  04/10/2019    CRC Screening: Colonoscopy  06/10/2025     Immunization History   Administered Date(s) Administered    Influenza Quadrivalent, 6-35 Months IM 10/06/2014, 10/28/2016, 09/08/2017    Influenza TIV (IM) 10/21/2013, 10/06/2015    Pneumococcal Polysaccharide PPV23 06/04/2014, 02/27/2017    Tdap 1955    Zoster 01/01/2014         Louanna Lulas, MD  [facility] AT [facility]

## 2018-07-19 ENCOUNTER — OFFICE VISIT (OUTPATIENT)
Dept: CARDIOLOGY CLINIC | Facility: CLINIC | Age: 63
End: 2018-07-19
Payer: MEDICARE

## 2018-07-19 VITALS
SYSTOLIC BLOOD PRESSURE: 137 MMHG | WEIGHT: 157.4 LBS | BODY MASS INDEX: 26.87 KG/M2 | DIASTOLIC BLOOD PRESSURE: 76 MMHG | OXYGEN SATURATION: 94 % | HEART RATE: 76 BPM | HEIGHT: 64 IN

## 2018-07-19 DIAGNOSIS — Q22.5 EBSTEIN ANOMALY: Primary | ICD-10-CM

## 2018-07-19 DIAGNOSIS — I51.89 DIASTOLIC DYSFUNCTION: ICD-10-CM

## 2018-07-19 DIAGNOSIS — I10 ESSENTIAL HYPERTENSION: ICD-10-CM

## 2018-07-19 DIAGNOSIS — I11.9 HYPERTENSIVE HEART DISEASE WITHOUT HEART FAILURE: ICD-10-CM

## 2018-07-19 DIAGNOSIS — E78.2 MIXED HYPERLIPIDEMIA: ICD-10-CM

## 2018-07-19 DIAGNOSIS — I37.1 NONRHEUMATIC PULMONARY VALVE INSUFFICIENCY: ICD-10-CM

## 2018-07-19 PROCEDURE — 99214 OFFICE O/P EST MOD 30 MIN: CPT | Performed by: INTERNAL MEDICINE

## 2018-07-19 NOTE — PROGRESS NOTES
CARDIOLOGY OFFICE VISIT  [facility]  [address], [place], [facility] Elsa Siddiqui  Tel: [phone]      NAME: Amber Lopez  AGE: 58 y o  SEX: male  : 1955   MRN: [id number]      Chief Complaint:  Chief Complaint   Patient presents with    Follow-up     checkup          History of Present Illness:   Patient comes for follow up  States he is doing well from cardiac stand point and denies chest pain / pressure, SOB, palpitations, lightheadedness, syncope, swelling feet, orthopnea, PND, claudication  HTN, HHD, DD -  Has been hypertensive for many years  Taking medications regularly  Denies lightheadedness, headache, medication side effects  HLP -  Has had hyperlipidemia for many years  Taking statin regularly along with diet control  Denies myalgia  PCP closely monitoring the blood work  Ebstein's anomaly, pulmonary regurgitation - stable, asymptomatic  Denies history of atrial fibrillation   States he exercises regularly on treadmill without any problems      Past Medical History:  Past Medical History:   Diagnosis Date    Abnormal EKG     Alcohol abuse     Allergic rhinitis     last assessed: 2014    Anxiety     Asthma     last assesssed: 2014    Atresia of esophagus with tracheo-esophageal fistula     Benign neoplasm of colon     Campylobacter enteritis     last assessed: 2015    Cardiac arrhythmia     last assessed: 2015    Chronic cough     last assessed: 2015    Chronic fatigue syndrome     resolved: 2014    Chronic sinusitis     resolved: 2014    COPD [facility]chronic obstructive [facility] disease) [facility]     Diabetes mellitus ([facility]     Digestive symptom     EKG, abnormal     Epistaxis     Generalized osteoarthritis     resolved: 2014    Heart disease     HTN (hypertension)     Hyperlipidemia     Myalgia     Myositis     Poisoning by drug or medicinal substance Past Surgical History:  Past Surgical History:   Procedure Laterality Date    TONSILLECTOMY           Family History:  Family History   Problem Relation Age of Onset    Asthma Mother          Social History:  Social History     Social History    Marital status: Single     Spouse name: N/A    Number of children: N/A    Years of education: N/A     Social History Main Topics    Smoking status: Former Smoker    Smokeless tobacco: Never Used      Comment: Quit 12 years    Alcohol use No    Drug use: No    Sexual activity: Yes     Partners: Female     Other Topics Concern    None     Social History Narrative    None         Active Problems:  Patient Active Problem List   Diagnosis    Allergic reaction    Generalized anxiety disorder    Cardiac arrhythmia    Chronic obstructive pulmonary disease (HCC)    Recurrent major depressive disorder, in partial remission ([facility]    Diastolic dysfunction    Ebstein anomaly    GERD without esophagitis    Mixed hyperlipidemia    Essential hypertension    Hypertensive heart disease    Nonrheumatic pulmonary valve insufficiency    Overweight    Tricuspid valve disorders, non-rheumatic    Type 2 diabetes mellitus, without long-term current use of insulin ([facility])         The following portions of the patient's history were reviewed and updated as appropriate: past medical history, past surgical history, past family history,  past social history, current medications, allergies and problem list       Review of Systems:  Constitutional: Denies fever, chills, fatigue  Eyes: Denies eye redness, eye discharge, double vision  ENT: Denies hearing loss, tinnitus, sneezing, nasal discharge, sore throat   Respiratory: Denies cough, expectoration, hemoptysis, shortness of breath  Cardiovascular: Denies chest pain, palpitations, orthopnea, PND, lower extremity swelling  Gastrointestinal: Denies abdominal pain, nausea, vomiting, hematemesis, diarrhea, bloody stools  Genito-Urinary: Denies dysuria, incontinence  Musculoskeletal: Denies back pain, joint pain, muscle pain  Neurologic: Denies confusion, lightheadedness, syncope, headache, focal weakness, sensory changes, seizures  Endocrine: Denies polyuria, polydipsia, temperature intolerance  Allergy and Immunology: Denies hives, insect bite sensitivity  Hematological and Lymphatic: Denies bleeding problems, swollen glands   Psychological: Denies depression, suicidal ideation, anxiety, panic  Dermatological: Denies pruritus, rash, skin lesion changes      Vitals:  Vitals:    07/19/18 1615   BP: 137/76   Pulse: 76   SpO2: 94%       Body mass index is 27 02 kg/m²  Weight (last 2 days)     Date/Time   Weight    07/19/18 1615  71 4 (157 4)                Physical Examination:  General: Patient is not in acute distress  Awake, alert, oriented in time, place and person  Responding to commands  Head: Normocephalic  Atraumatic  Eyes: Both pupils normal sized, round and reactive to light  Nonicteric  ENT: Normal external ear canals  Nares normal, no drainage  Lips and oral mucosa normal  Neck: Supple  JVP not raised  Trachea central  No thyromegaly  Lungs: Bilateral bronchovascular breath sounds with no crackles or rhonchi  Chest wall: No tenderness  Cardiovascular: RRR  S1 and S2 normal  No murmur, rub or gallop  Gastrointestinal: Abdomen soft, nontender  No guarding or rigidity  Liver and spleen not palpable  Bowel sounds present  Neurologic: Patient is awake, alert, oriented in time, place and person  Responding to command  Moving all extremities  Integumentary:  No skin rash  Lymphatic: No cervical lymphadenopathy  Back: Symmetric   No CVA tenderness  Extremities: No clubbing, cyanosis or edema      Laboratory Results:  CBC with diff:   Lab Results   Component Value Date    WBC 8 24 03/27/2018    WBC 8 33 10/19/2015    RBC 5 15 03/27/2018    RBC 5 46 10/19/2015    HGB 14 0 03/27/2018    HGB 14 8 10/19/2015    HCT 42 5 2018    HCT 44 9 10/19/2015    MCV 83 2018    MCV 82 10/19/2015    MCH 27 2 2018    MCH 27 1 10/19/2015    RDW 13 0 2018    RDW 13 0 10/19/2015     2018     10/19/2015       CMP:  Lab Results   Component Value Date    CREATININE 0 84 2018    CREATININE 0 85 10/19/2015    BUN 15 2018    BUN 10 10/19/2015     2018     10/19/2015    K 3 8 2018    K 3 6 10/19/2015     2018     10/19/2015    CO2 28 2018    CO2 27 10/19/2015    GLUCOSE 88 2017    GLUCOSE 142 (H) 10/19/2015    PROT 7 5 2018    PROT 7 3 10/19/2015    ALKPHOS 85 2018    ALKPHOS 87 10/19/2015    ALT 12 2018    ALT 17 10/19/2015    AST 18 2018    AST 17 10/19/2015       Lab Results   Component Value Date    HGBA1C 6 5 (H) 2018    HGBA1C 6 7 (H) 10/19/2015       No results found for: TROPONINI, CKMB, CKTOTAL    Lipid Profile:   Lab Results   Component Value Date    CHOL 148 2018    CHOL 142 2017    CHOL 179 2017     Lab Results   Component Value Date    HDL 55 2018    HDL 63 (H) 2017    HDL 62 (H) 2017     Lab Results   Component Value Date    LDLCALC 71 2018    LDLCALC 51 2017    LDLCALC 83 2017     Lab Results   Component Value Date    TRIG 108 2018    TRIG 142 2017    TRIG 172 (H) 2017       Cardiac testing:   Results for orders placed during the hospital encounter of 16   Echo complete with contrast if indicated    N[facility]ative [facility]  [phone]    Transthoracic Echocardiogram  2D, M-mode, Doppler, and Color Doppler    Study date:  2016    Patient: Elsa Talavera  MR number: [id number]  Account number: [de-identified]  : [id number]  Age: 57 years  Gender: Male  Status: Outpatient  Location: [facility]  Height: 66 in  Weight: 167 lb  BP: 130/ 100 mmHg    Indications: Ebstein's anomoly  Diagnoses: Q24 9 - Congenital malformation of heart, unspecified    Sonographer:  Floyd RCS  Primary Physician:  Marlo Paniagua MD  Referring Physician:  Ora Reddy MD  Group:  [facility]  Interpreting Physician:  Ora Reddy MD    SUMMARY    LEFT VENTRICLE:  Systolic function was normal  Ejection fraction was estimated to be 60 %  There were no regional wall motion abnormalities  Doppler parameters were consistent with abnormal left ventricular relaxation  (grade 1 diastolic dysfunction)  LEFT ATRIUM:  The atrium was mildly dilated  RIGHT ATRIUM:  The right atrium appeared enlarged due to atrialization of a part of the RV due  to mild apical displacement of the septal leaflet of the TV     TRICUSPID VALVE:  There was mild apical displacement of the septal leaflet suggestive of mild  Ebstein's anomaly  There was trace regurgitation  Pulmonary artery systolic pressure was at the upper limits of normal     HISTORY: PRIOR HISTORY: COPD  Former smoker  Risk factors: hypertension,  diabetes, and medication-treated hypercholesterolemia  PROCEDURE: The study was performed in the [address] [place] [address]  This  was a routine study  The transthoracic approach was used  The study included  complete 2D imaging, M-mode, complete spectral Doppler, and color Doppler  The  heart rate was 88 bpm, at the start of the study  Images were obtained from the  parasternal, apical, subcostal, and suprasternal notch acoustic windows  Image  quality was adequate  LEFT VENTRICLE: Size was normal  Systolic function was normal  Ejection  fraction was estimated to be 60 %  There were no regional wall motion  abnormalities  Wall thickness was normal  DOPPLER: Doppler parameters were  consistent with abnormal left ventricular relaxation (grade 1 diastolic  dysfunction)      RIGHT VENTRICLE: Systolic function was normal  Wall thickness was normal     LEFT ATRIUM: The atrium was mildly dilated  RIGHT ATRIUM: The right atrium appeared enlarged due to atrialization of a part  of the RV due to mild apical displacement of the septal leaflet of the TV  MITRAL VALVE: Valve structure was normal  There was normal leaflet separation  DOPPLER: The transmitral velocity was within the normal range  There was no  evidence for stenosis  There was no significant regurgitation  AORTIC VALVE: The valve was trileaflet  Leaflets exhibited normal thickness and  normal cuspal separation  DOPPLER: Transaortic velocity was within the normal  range  There was no evidence for stenosis  There was no significant  regurgitation  TRICUSPID VALVE: There was mild apical displacement of the septal leaflet  suggestive of mild Ebstein's anomaly  There was normal leaflet separation  DOPPLER: The transtricuspid velocity was within the normal range  There was no  evidence for stenosis  There was trace regurgitation  Pulmonary artery systolic  pressure was at the upper limits of normal     PULMONIC VALVE: Not well visualized  DOPPLER: The transpulmonic velocity was  within the normal range  There was no significant regurgitation  PERICARDIUM: There was no pericardial effusion  The pericardium was normal in  appearance  AORTA: The root exhibited normal size  SYSTEMIC VEINS: IVC: The inferior vena cava was normal in size  Respirophasic  changes were normal     SYSTEM MEASUREMENT TABLES    Apical four chamber  4 chamber Left Atrium Volume Index; Planimetry; End Systole; Apical four  chamber;: 19 22 cm2 Left Ventricular End Diastolic Volume; Method of Disks,  Single Plane; Apical four chamber;: 118 8 ml Left Ventricular End Systolic  Volume; Method of Disks, Single Plane; Apical four chamber;: 49 2 ml Right  Atrium Systolic Area; Planimetry; End Systole; Apical four chamber;: 15 89 cm2  Right Ventricular Internal Diastolic Dimension; End Diastole;  Apical four  chamber;: 35 6 mm    Unspecified Scan Mode  Aortic Root Diameter; End Systole;: 38 mm  Left atrial diameter; End Diastole;: 39 6 mm  Interventricular Septum Diastolic Thickness; Teichholz; End Diastole;: 10 5 mm  Interventricular Septum Systolic Thickness; Teichholz; End Systole;: 16 3 mm  Left Ventricle Internal End Diastolic Dimension; Teichholz;: 45 6 mm  Left Ventricle Internal Systolic Dimension; Teichholz; End Systole;: 26 4 mm  Left Ventricle Posterior Wall Diastolic Thickness; Teichholz; End Diastole;: 9  mm Left Ventricle Posterior Wall Systolic Thickness; Teichholz; End Systole;:  15 mm  Left Ventricular Ejection Fraction;  Teichholz;: 73 2 %  Mitral Valve Area; Area by Pressure Half-Time; Systole;: 5 cm2  Mitral Valve E to A Ratio; Systole;: 0 85  Maximum Tricuspid valve regurgitation pressure gradient; Regurgitant Flow;  Systole;: 31 7 mm[Hg]    [facility] Accredited Echocardiography Laboratory    Prepared and electronically signed by    Cherie Thacker MD  Signed 12-Jul-2016 18:54:14         Medications:    Current Outpatient Prescriptions:     aspirin (ECOTRIN LOW STRENGTH) 81 mg EC tablet, Take 1 tablet by mouth daily, Disp: , Rfl:     atorvastatin (LIPITOR) 10 mg tablet, Take 1 tablet by mouth, Disp: , Rfl:     busPIRone (BUSPAR) 10 mg tablet, Take 10 mg by mouth 2 (two) times a day, Disp: , Rfl: 3    busPIRone (BUSPAR) 15 mg tablet, Take 1 tablet by mouth 2 (two) times a day, Disp: , Rfl:     diltiazem (TIAZAC) 180 MG 24 hr capsule, Take 1 capsule by mouth daily, Disp: , Rfl:     FLUoxetine (PROZAC) 40 MG capsule, Take 1 capsule by mouth daily, Disp: , Rfl:     fluticasone-salmeterol (ADVAIR DISKUS) 250-50 mcg/dose inhaler, Inhale 1 puff 2 (two) times a day, Disp: , Rfl:     lisinopril (ZESTRIL) 20 mg tablet, TAKE 1 TABLET DAILY, Disp: 90 tablet, Rfl: 0    metFORMIN (GLUCOPHAGE) 500 mg tablet, Take 1 tablet by mouth 2 (two) times a day, Disp: , Rfl:     methocarbamol (ROBAXIN) 500 mg tablet, Take 1 tablet (500 mg total) by mouth 2 (two) times a day, Disp: 20 tablet, Rfl: 0    naproxen (NAPROSYN) 500 mg tablet, Take 1 tablet (500 mg total) by mouth 2 (two) times a day with meals, Disp: 20 tablet, Rfl: 0    ONETOUCH DELICA LANCETS FINE MISC, by Does not apply route, Disp: , Rfl:     temazepam (RESTORIL) 30 mg capsule, Take 1 capsule by mouth, Disp: , Rfl:     VENTOLIN  (90 Base) MCG/ACT inhaler, INHALE 2 PUFFS BY MOUTH EVERY 4 HOURS AS NEEDED, Disp: 1 Inhaler, Rfl: 3    zolpidem (AMBIEN) 10 mg tablet, , Disp: , Rfl:       Allergies:  No Known Allergies      Assessment and Plan:  1  Ebstein's anomaly   follow-up echocardiogram ordered    2  Essential hypertension, Hypertensive heart disease without heart failure, Diastolic dysfunction  BP stable  Continue current medications    3  Mixed hyperlipidemia   continue statin and diet control  His PCP closely monitors his blood work    4  Nonrheumatic pulmonary valve insufficiency   follow-up echocardiogram ordered    Recommend aggressive risk factor modification and therapeutic lifestyle changes  Low-salt, low-calorie, low-fat, low-cholesterol diet with regular exercise and to optimize weight  I will defer the ordering and monitoring of necessity lab studies to you, but I am available and happy to review and manage any of the data at your request in the future  Discussed concepts of atherosclerosis, including signs and symptoms of cardiac disease  Previous studies were reviewed  Safety measures were reviewed  Questions were entertained and answered  Patient was advised to report any problems requiring medical attention  Follow-up with PCP and appropriate specialist and lab work as discussed  Return for follow up visit as scheduled or earlier, if needed  Thank you for allowing me to participate in the care and evaluation of your patient  Should you have any questions, please feel free to contact me        Katherine Points, MD  7/19/2018,4:27 PM

## 2018-08-13 ENCOUNTER — APPOINTMENT (OUTPATIENT)
Dept: LAB | Facility: CLINIC | Age: 63
End: 2018-08-13
Payer: MEDICARE

## 2018-08-13 DIAGNOSIS — E78.2 MIXED HYPERLIPIDEMIA: ICD-10-CM

## 2018-08-13 DIAGNOSIS — E11.9 TYPE 2 DIABETES MELLITUS WITHOUT COMPLICATION, WITHOUT LONG-TERM CURRENT USE OF INSULIN (HCC): ICD-10-CM

## 2018-08-13 LAB
ALBUMIN SERPL BCP-MCNC: 3.4 G/DL (ref 3.5–5)
ALP SERPL-CCNC: 65 U/L (ref 46–116)
ALT SERPL W P-5'-P-CCNC: 11 U/L (ref 12–78)
ANION GAP SERPL CALCULATED.3IONS-SCNC: 6 MMOL/L (ref 4–13)
AST SERPL W P-5'-P-CCNC: 16 U/L (ref 5–45)
BASOPHILS # BLD AUTO: 0.04 THOUSANDS/ΜL (ref 0–0.1)
BASOPHILS NFR BLD AUTO: 1 % (ref 0–1)
BILIRUB SERPL-MCNC: 0.8 MG/DL (ref 0.2–1)
BUN SERPL-MCNC: 18 MG/DL (ref 5–25)
CALCIUM SERPL-MCNC: 8.6 MG/DL (ref 8.3–10.1)
CHLORIDE SERPL-SCNC: 103 MMOL/L (ref 100–108)
CHOLEST SERPL-MCNC: 168 MG/DL (ref 50–200)
CO2 SERPL-SCNC: 26 MMOL/L (ref 21–32)
CREAT SERPL-MCNC: 0.79 MG/DL (ref 0.6–1.3)
CREAT UR-MCNC: 144 MG/DL
EOSINOPHIL # BLD AUTO: 0.29 THOUSAND/ΜL (ref 0–0.61)
EOSINOPHIL NFR BLD AUTO: 4 % (ref 0–6)
ERYTHROCYTE [DISTWIDTH] IN BLOOD BY AUTOMATED COUNT: 12.4 % (ref 11.6–15.1)
EST. AVERAGE GLUCOSE BLD GHB EST-MCNC: 131 MG/DL
GFR SERPL CREATININE-BSD FRML MDRD: 95 ML/MIN/1.73SQ M
GLUCOSE P FAST SERPL-MCNC: 99 MG/DL (ref 65–99)
HBA1C MFR BLD: 6.2 % (ref 4.2–6.3)
HCT VFR BLD AUTO: 42.3 % (ref 36.5–49.3)
HDLC SERPL-MCNC: 67 MG/DL (ref 40–60)
HGB BLD-MCNC: 13.6 G/DL (ref 12–17)
IMM GRANULOCYTES # BLD AUTO: 0.02 THOUSAND/UL (ref 0–0.2)
IMM GRANULOCYTES NFR BLD AUTO: 0 % (ref 0–2)
LDLC SERPL CALC-MCNC: 74 MG/DL (ref 0–100)
LYMPHOCYTES # BLD AUTO: 2.58 THOUSANDS/ΜL (ref 0.6–4.47)
LYMPHOCYTES NFR BLD AUTO: 35 % (ref 14–44)
MCH RBC QN AUTO: 27.5 PG (ref 26.8–34.3)
MCHC RBC AUTO-ENTMCNC: 32.2 G/DL (ref 31.4–37.4)
MCV RBC AUTO: 86 FL (ref 82–98)
MICROALBUMIN UR-MCNC: 7 MG/L (ref 0–20)
MICROALBUMIN/CREAT 24H UR: 5 MG/G CREATININE (ref 0–30)
MONOCYTES # BLD AUTO: 0.84 THOUSAND/ΜL (ref 0.17–1.22)
MONOCYTES NFR BLD AUTO: 11 % (ref 4–12)
NEUTROPHILS # BLD AUTO: 3.66 THOUSANDS/ΜL (ref 1.85–7.62)
NEUTS SEG NFR BLD AUTO: 49 % (ref 43–75)
NONHDLC SERPL-MCNC: 101 MG/DL
NRBC BLD AUTO-RTO: 0 /100 WBCS
PLATELET # BLD AUTO: 291 THOUSANDS/UL (ref 149–390)
PMV BLD AUTO: 9.9 FL (ref 8.9–12.7)
POTASSIUM SERPL-SCNC: 3.8 MMOL/L (ref 3.5–5.3)
PROT SERPL-MCNC: 7.2 G/DL (ref 6.4–8.2)
RBC # BLD AUTO: 4.94 MILLION/UL (ref 3.88–5.62)
SODIUM SERPL-SCNC: 135 MMOL/L (ref 136–145)
TRIGL SERPL-MCNC: 136 MG/DL
TSH SERPL DL<=0.05 MIU/L-ACNC: 1.64 UIU/ML (ref 0.36–3.74)
WBC # BLD AUTO: 7.43 THOUSAND/UL (ref 4.31–10.16)

## 2018-08-13 PROCEDURE — 82570 ASSAY OF URINE CREATININE: CPT

## 2018-08-13 PROCEDURE — 82043 UR ALBUMIN QUANTITATIVE: CPT

## 2018-08-13 PROCEDURE — 80061 LIPID PANEL: CPT

## 2018-08-13 PROCEDURE — 83036 HEMOGLOBIN GLYCOSYLATED A1C: CPT

## 2018-08-13 PROCEDURE — 84443 ASSAY THYROID STIM HORMONE: CPT

## 2018-08-13 PROCEDURE — 85025 COMPLETE CBC W/AUTO DIFF WBC: CPT

## 2018-08-13 PROCEDURE — 36415 COLL VENOUS BLD VENIPUNCTURE: CPT

## 2018-08-13 PROCEDURE — 80053 COMPREHEN METABOLIC PANEL: CPT

## 2018-08-20 ENCOUNTER — OFFICE VISIT (OUTPATIENT)
Dept: INTERNAL MEDICINE CLINIC | Facility: CLINIC | Age: 63
End: 2018-08-20
Payer: MEDICARE

## 2018-08-20 VITALS
HEART RATE: 80 BPM | DIASTOLIC BLOOD PRESSURE: 78 MMHG | HEIGHT: 64 IN | BODY MASS INDEX: 26.91 KG/M2 | WEIGHT: 157.6 LBS | SYSTOLIC BLOOD PRESSURE: 110 MMHG

## 2018-08-20 DIAGNOSIS — E11.9 TYPE 2 DIABETES MELLITUS WITHOUT COMPLICATION, WITHOUT LONG-TERM CURRENT USE OF INSULIN (HCC): Primary | ICD-10-CM

## 2018-08-20 DIAGNOSIS — F33.41 RECURRENT MAJOR DEPRESSIVE DISORDER, IN PARTIAL REMISSION (HCC): ICD-10-CM

## 2018-08-20 DIAGNOSIS — E66.3 OVERWEIGHT: ICD-10-CM

## 2018-08-20 DIAGNOSIS — J44.9 CHRONIC OBSTRUCTIVE PULMONARY DISEASE, UNSPECIFIED COPD TYPE (HCC): ICD-10-CM

## 2018-08-20 DIAGNOSIS — F41.1 GENERALIZED ANXIETY DISORDER: ICD-10-CM

## 2018-08-20 DIAGNOSIS — E78.2 MIXED HYPERLIPIDEMIA: ICD-10-CM

## 2018-08-20 DIAGNOSIS — I10 ESSENTIAL HYPERTENSION: ICD-10-CM

## 2018-08-20 PROBLEM — T78.40XA ALLERGIC REACTION: Status: RESOLVED | Noted: 2017-09-26 | Resolved: 2018-08-20

## 2018-08-20 PROCEDURE — 99214 OFFICE O/P EST MOD 30 MIN: CPT | Performed by: INTERNAL MEDICINE

## 2018-08-20 NOTE — PROGRESS NOTES
INTERNAL MEDICINE OFFICE VISIT  [facility] AT [address]  Tel: [phone]      NAME: Kelin Mccoy  AGE: 58 y o  SEX: male  : 1955   MRN: [id number]    DATE: 2018  TIME: 11:02 AM      Assessment and Plan:  1  Type 2 diabetes mellitus without complication, without long-term current use of insulin (HCC)  Continue present medication  - CBC and differential; Future  - Comprehensive metabolic panel; Future  - TSH, 3rd generation; Future  - Hemoglobin A1C; Future    2  Essential hypertension  Continue lisinopril    3  Mixed hyperlipidemia  Continue atorvastatin  - Lipid panel; Future    4  Chronic obstructive pulmonary disease, unspecified COPD type ([facility] )  Continue inhalers    5  Generalized anxiety disorder  Continue BuSpar and follow up with Psychiatry    6  Recurrent major depressive disorder, in partial remission (HCC)  Continue Prozac    7  Overweight  He continues to watch his diet      - Counseling Documentation: patient was counseled regarding: diagnostic results, instructions for management, risk factor reductions, prognosis, patient and family education, impressions, risks and benefits of treatment options and importance of compliance with treatment  - Medication Side Effects: Adverse side effects of medications were reviewed with the patient/guardian today  Return for follow up visit in 6 months or earlier, if needed  Chief Complaint:  No chief complaint on file  History of Present Illness:   Type 2 diabetes-very well controlled with a hemoglobin A1c of 6 2  Hypertension-well controlled on present medication  Hyperlipidemia-takes atorvastatin regularly  COPD-takes inhalers with relief of symptoms  Anxiety and depression-she has been taking his medication regularly and follows up with Psychiatry but still has a lot of symptoms of depression    Overweight-she has been trying to watch his diet      Active Problem List:  Patient Active Problem List   Diagnosis    Generalized anxiety disorder    Cardiac arrhythmia    Chronic obstructive pulmonary disease (HCC)    Recurrent major depressive disorder, in partial remission ([facility] [address] )    Diastolic dysfunction    Ebstein anomaly    GERD without esophagitis    Mixed hyperlipidemia    Essential hypertension    Hypertensive heart disease    Nonrheumatic pulmonary valve insufficiency    Overweight    Tricuspid valve disorders, non-rheumatic    Type 2 diabetes mellitus, without long-term current use of insulin ([facility] )         Past Medical History:  Past Medical History:   Diagnosis Date    Abnormal EKG     Alcohol abuse     Allergic rhinitis     last assessed: 6/4/2014    Anxiety     Asthma     last assesssed: 6/4/2014    Atresia of esophagus with tracheo-esophageal fistula     Benign neoplasm of colon     Campylobacter enteritis     last assessed: 5/6/2015    Cardiac arrhythmia     last assessed: 6/19/2015    Chronic cough     last assessed: 1/29/2015    Chronic fatigue syndrome     resolved: 6/4/2014    Chronic sinusitis     resolved: 6/4/2014    COPD (chronic obstructive pulmonary disease) ([facility] [address] )     Diabetes mellitus ([facility]     Digestive symptom     EKG, abnormal     Epistaxis     Generalized osteoarthritis     resolved: 6/4/2014    Heart disease     HTN (hypertension)     Hyperlipidemia     Myalgia     Myositis     Poisoning by drug or medicinal substance          Past Surgical History:  Past Surgical History:   Procedure Laterality Date    TONSILLECTOMY           Family History:  Family History   Problem Relation Age of Onset    Asthma Mother          Social History:  Social History     Social History    Marital status: Single     Spouse name: N/A    Number of children: N/A    Years of education: N/A     Social History Main Topics    Smoking status: Former Smoker    Smokeless tobacco: Never Used      Comment: Quit 12 years    Alcohol use No    Drug use: No    Sexual activity: Yes     Partners: Female     Other Topics Concern    None     Social History Narrative    None         Allergies:  No Known Allergies      Medications:    Current Outpatient Prescriptions:     aspirin (ECOTRIN LOW STRENGTH) 81 mg EC tablet, Take 1 tablet by mouth daily, Disp: , Rfl:     atorvastatin (LIPITOR) 10 mg tablet, Take 1 tablet by mouth, Disp: , Rfl:     busPIRone (BUSPAR) 10 mg tablet, Take 10 mg by mouth 2 (two) times a day, Disp: , Rfl: 3    FLUoxetine (PROZAC) 40 MG capsule, Take 1 capsule by mouth daily, Disp: , Rfl:     fluticasone-salmeterol (ADVAIR DISKUS) 250-50 mcg/dose inhaler, Inhale 1 puff 2 (two) times a day, Disp: , Rfl:     lisinopril (ZESTRIL) 20 mg tablet, TAKE 1 TABLET DAILY, Disp: 90 tablet, Rfl: 0    metFORMIN (GLUCOPHAGE) 500 mg tablet, Take 1 tablet by mouth 2 (two) times a day, Disp: , Rfl:     methocarbamol (ROBAXIN) 500 mg tablet, Take 1 tablet (500 mg total) by mouth 2 (two) times a day, Disp: 20 tablet, Rfl: 0    ONETOUCH DELICA LANCETS FINE MISC, by Does not apply route, Disp: , Rfl:     temazepam (RESTORIL) 30 mg capsule, Take 1 capsule by mouth, Disp: , Rfl:     VENTOLIN  (90 Base) MCG/ACT inhaler, INHALE 2 PUFFS BY MOUTH EVERY 4 HOURS AS NEEDED, Disp: 1 Inhaler, Rfl: 3    zolpidem (AMBIEN) 10 mg tablet, , Disp: , Rfl:     busPIRone (BUSPAR) 15 mg tablet, Take 1 tablet by mouth 2 (two) times a day, Disp: , Rfl:     diltiazem (TIAZAC) 180 MG 24 hr capsule, Take 1 capsule by mouth daily, Disp: , Rfl:       The following portions of the patient's history were reviewed and updated as appropriate: past medical history, past surgical history, family history, social history, allergies, current medications and active problem list       Review of Systems:  Constitutional: Denies fever, chills, weight gain, weight loss, fatigue  Eyes: Denies eye redness, eye discharge, double vision, change in visual acuity  ENT: Denies hearing loss, tinnitus, sneezing, nasal congestion, nasal discharge, sore throat   Respiratory: Denies cough, expectoration, hemoptysis, shortness of breath, wheezing  Cardiovascular: Denies chest pain, palpitations, lower extremity swelling, orthopnea, PND  Gastrointestinal: Denies abdominal pain, heartburn, nausea, vomiting, hematemesis, diarrhea, bloody stools  Genito-Urinary: Denies dysuria, frequency, difficulty in micturition, nocturia, incontinence  Musculoskeletal: Denies back pain, joint pain, muscle pain  Neurologic: Denies confusion, lightheadedness, syncope, headache, focal weakness, sensory changes, seizures  Endocrine: Denies polyuria, polydipsia, temperature intolerance  Allergy and Immunology: Denies hives, insect bite sensitivity  Hematological and Lymphatic: Denies bleeding problems, swollen glands   Psychological: has depression, anxiety,  Dermatological: Denies pruritus, rash, skin lesion changes      Vitals:  Vitals:    08/20/18 1027   BP: 110/78   Pulse: 80       Body mass index is 27 05 kg/m²  Weight (last 2 days)     Date/Time   Weight    08/20/18 1027  71 5 (157 6)                Physical Examination:  General: Patient is not in acute distress  Awake, alert, responding to commands  No weight gain or loss  Head: Normocephalic  Atraumatic  Eyes: Conjunctiva and lids with no swelling, erythema or discharge  Both pupils normal sized, round and reactive to light  Sclera nonicteric  ENT: External examination of nose and ear normal  Otoscopic examination shows translucent tympanic membranes with patent canals without erythema  Oropharynx moist with no erythema, edema, exudate or lesions  Neck: Supple  JVP not raised  Trachea midline  No masses  No thyromegaly  Lungs: No signs of increased work of breathing or respiratory distress  Bilateral bronchovascular breath sounds with no crackles or rhonchi  Chest wall: No tenderness  Cardiovascular: Normal PMI  No thrills  Regular rate and rhythm   S1 and S2 normal  No murmur, rub or gallop  Gastrointestinal: Abdomen soft, nontender  No guarding or rigidity  Liver and spleen not palpable  Bowel sounds present  Neurologic: Cranial nerves II-XII intact   Cortical functions normal  Motor system - Reflexes 2+ and symmetrical  Sensations normal  Musculoskeletal: Gait normal  No joint tenderness  Integumentary: Skin normal with no rash or lesions  Lymphatic: No palpable lymph nodes in neck, axilla or groin  Extremities: No clubbing, cyanosis, edema or varicosities  Psychological: Judgement and insight normal  Depressed      Laboratory Results:  CBC with diff:   Lab Results   Component Value Date    WBC 7 43 08/13/2018    WBC 8 33 10/19/2015    RBC 4 94 08/13/2018    RBC 5 46 10/19/2015    HGB 13 6 08/13/2018    HGB 14 8 10/19/2015    HCT 42 3 08/13/2018    HCT 44 9 10/19/2015    MCV 86 08/13/2018    MCV 82 10/19/2015    MCH 27 5 08/13/2018    MCH 27 1 10/19/2015    RDW 12 4 08/13/2018    RDW 13 0 10/19/2015     08/13/2018     10/19/2015       CMP:  Lab Results   Component Value Date    CREATININE 0 79 08/13/2018    CREATININE 0 85 10/19/2015    BUN 18 08/13/2018    BUN 10 10/19/2015     (L) 08/13/2018     10/19/2015    K 3 8 08/13/2018    K 3 6 10/19/2015     08/13/2018     10/19/2015    CO2 26 08/13/2018    CO2 27 10/19/2015    GLUCOSE 88 01/26/2017    GLUCOSE 142 (H) 10/19/2015    PROT 7 2 08/13/2018    PROT 7 3 10/19/2015    ALKPHOS 65 08/13/2018    ALKPHOS 87 10/19/2015    ALT 11 (L) 08/13/2018    ALT 17 10/19/2015    AST 16 08/13/2018    AST 17 10/19/2015       Lab Results   Component Value Date    HGBA1C 6 2 08/13/2018    HGBA1C 6 7 (H) 10/19/2015       No results found for: TROPONINI, CKMB, CKTOTAL    Lipid Profile:   Lab Results   Component Value Date    CHOL 139 10/19/2015    CHOL 156 05/30/2015     Lab Results   Component Value Date    HDL 67 (H) 08/13/2018    HDL 55 03/27/2018     Lab Results   Component Value Date    LDLCALC 74 08/13/2018    LDLCALC 71 03/27/2018     Lab Results   Component Value Date    TRIG 136 08/13/2018    TRIG 108 03/27/2018         Health Maintenance:  Health Maintenance   Topic Date Due    HIV SCREENING  1955    Hepatitis C Screening  1955    DTaP,Tdap,and Td Vaccines (1 - Tdap) 05/05/1975    DM Eye Exam  06/29/2018    INFLUENZA VACCINE  09/01/2018    HEMOGLOBIN A1C  02/13/2019    Medicare Annual Wellness Visit (AWV)  04/10/2019    Diabetic Foot Exam  04/10/2019    URINE MICROALBUMIN  08/13/2019    CRC Screening: Colonoscopy  06/10/2025    Pneumococcal PPSV23 Medium Risk Adult  Completed     Immunization History   Administered Date(s) Administered    Influenza Quadrivalent, 6-35 Months IM 10/06/2014, 10/28/2016, 09/08/2017    Influenza TIV (IM) 10/21/2013, 10/06/2015    Pneumococcal Polysaccharide PPV23 06/04/2014, 02/27/2017    Tdap 1955    Zoster 01/01/2014         Reta Cochran MD  8/20/2018,11:02 AM

## 2018-08-28 DIAGNOSIS — I10 ESSENTIAL HYPERTENSION: ICD-10-CM

## 2018-08-29 RX ORDER — LISINOPRIL 20 MG/1
TABLET ORAL
Qty: 90 TABLET | Refills: 0 | Status: SHIPPED | OUTPATIENT
Start: 2018-08-29 | End: 2018-11-28 | Stop reason: SDUPTHER

## 2018-10-02 ENCOUNTER — TELEPHONE (OUTPATIENT)
Dept: INTERNAL MEDICINE CLINIC | Facility: CLINIC | Age: 63
End: 2018-10-02

## 2018-10-02 ENCOUNTER — ANESTHESIA EVENT (EMERGENCY)
Dept: PERIOP | Facility: HOSPITAL | Age: 63
End: 2018-10-02
Payer: MEDICARE

## 2018-10-02 ENCOUNTER — ANESTHESIA (EMERGENCY)
Dept: PERIOP | Facility: HOSPITAL | Age: 63
End: 2018-10-02
Payer: MEDICARE

## 2018-10-02 ENCOUNTER — HOSPITAL ENCOUNTER (EMERGENCY)
Facility: HOSPITAL | Age: 63
Discharge: HOME | End: 2018-10-02
Attending: EMERGENCY MEDICINE | Admitting: EMERGENCY MEDICINE
Payer: MEDICARE

## 2018-10-02 ENCOUNTER — APPOINTMENT (EMERGENCY)
Dept: RADIOLOGY | Facility: HOSPITAL | Age: 63
End: 2018-10-02
Payer: MEDICARE

## 2018-10-02 VITALS
TEMPERATURE: 99.1 F | HEIGHT: 64 IN | WEIGHT: 157.63 LBS | HEART RATE: 93 BPM | OXYGEN SATURATION: 95 % | BODY MASS INDEX: 26.91 KG/M2 | SYSTOLIC BLOOD PRESSURE: 150 MMHG | RESPIRATION RATE: 20 BRPM | DIASTOLIC BLOOD PRESSURE: 100 MMHG

## 2018-10-02 DIAGNOSIS — T18.128A ESOPHAGEAL OBSTRUCTION DUE TO FOOD IMPACTION: Primary | ICD-10-CM

## 2018-10-02 DIAGNOSIS — K22.2 ESOPHAGEAL OBSTRUCTION DUE TO FOOD IMPACTION: Primary | ICD-10-CM
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
## 2018-10-02 LAB
ALBUMIN SERPL BCP-MCNC: 4.3 G/DL (ref 3.5–5)
ALP SERPL-CCNC: 76 U/L (ref 46–116)
ALT SERPL W P-5'-P-CCNC: 19 U/L (ref 12–78)
ANION GAP SERPL CALCULATED.3IONS-SCNC: 8 MMOL/L (ref 4–13)
AST SERPL W P-5'-P-CCNC: 23 U/L (ref 5–45)
BASOPHILS # BLD AUTO: 0.08 THOUSANDS/ΜL (ref 0–0.1)
BASOPHILS NFR BLD AUTO: 1 % (ref 0–1)
BILIRUB SERPL-MCNC: 0.6 MG/DL (ref 0.2–1)
BUN SERPL-MCNC: 17 MG/DL (ref 5–25)
CALCIUM SERPL-MCNC: 9.8 MG/DL (ref 8.3–10.1)
CHLORIDE SERPL-SCNC: 101 MMOL/L (ref 100–108)
CO2 SERPL-SCNC: 31 MMOL/L (ref 21–32)
CREAT SERPL-MCNC: 0.97 MG/DL (ref 0.6–1.3)
EOSINOPHIL # BLD AUTO: 0.07 THOUSAND/ΜL (ref 0–0.61)
EOSINOPHIL NFR BLD AUTO: 1 % (ref 0–6)
ERYTHROCYTE [DISTWIDTH] IN BLOOD BY AUTOMATED COUNT: 12.7 % (ref 11.6–15.1)
GFR SERPL CREATININE-BSD FRML MDRD: 82 ML/MIN/1.73SQ M
GLUCOSE SERPL-MCNC: 100 MG/DL (ref 65–140)
HCT VFR BLD AUTO: 48.8 % (ref 36.5–49.3)
HGB BLD-MCNC: 15.6 G/DL (ref 12–17)
IMM GRANULOCYTES # BLD AUTO: 0.02 THOUSAND/UL (ref 0–0.2)
IMM GRANULOCYTES NFR BLD AUTO: 0 % (ref 0–2)
LYMPHOCYTES # BLD AUTO: 2.54 THOUSANDS/ΜL (ref 0.6–4.47)
LYMPHOCYTES NFR BLD AUTO: 27 % (ref 14–44)
MCH RBC QN AUTO: 27.5 PG (ref 26.8–34.3)
MCHC RBC AUTO-ENTMCNC: 32 G/DL (ref 31.4–37.4)
MCV RBC AUTO: 86 FL (ref 82–98)
MONOCYTES # BLD AUTO: 0.67 THOUSAND/ΜL (ref 0.17–1.22)
MONOCYTES NFR BLD AUTO: 7 % (ref 4–12)
NEUTROPHILS # BLD AUTO: 5.88 THOUSANDS/ΜL (ref 1.85–7.62)
NEUTS SEG NFR BLD AUTO: 64 % (ref 43–75)
NRBC BLD AUTO-RTO: 0 /100 WBCS
PLATELET # BLD AUTO: 353 THOUSANDS/UL (ref 149–390)
PMV BLD AUTO: 9.9 FL (ref 8.9–12.7)
POTASSIUM SERPL-SCNC: 4.2 MMOL/L (ref 3.5–5.3)
PROT SERPL-MCNC: 8.7 G/DL (ref 6.4–8.2)
RBC # BLD AUTO: 5.68 MILLION/UL (ref 3.88–5.62)
SODIUM SERPL-SCNC: 140 MMOL/L (ref 136–145)
WBC # BLD AUTO: 9.26 THOUSAND/UL (ref 4.31–10.16)

## 2018-10-02 PROCEDURE — 99285 EMERGENCY DEPT VISIT HI MDM: CPT

## 2018-10-02 PROCEDURE — 96374 THER/PROPH/DIAG INJ IV PUSH: CPT

## 2018-10-02 PROCEDURE — 71046 X-RAY EXAM CHEST 2 VIEWS: CPT

## 2018-10-02 PROCEDURE — 36415 COLL VENOUS BLD VENIPUNCTURE: CPT | Performed by: EMERGENCY MEDICINE

## 2018-10-02 PROCEDURE — 96361 HYDRATE IV INFUSION ADD-ON: CPT

## 2018-10-02 PROCEDURE — 80053 COMPREHEN METABOLIC PANEL: CPT | Performed by: EMERGENCY MEDICINE

## 2018-10-02 PROCEDURE — 43247 EGD REMOVE FOREIGN BODY: CPT | Performed by: INTERNAL MEDICINE

## 2018-10-02 PROCEDURE — 99024 POSTOP FOLLOW-UP VISIT: CPT | Performed by: INTERNAL MEDICINE

## 2018-10-02 PROCEDURE — 85025 COMPLETE CBC W/AUTO DIFF WBC: CPT | Performed by: EMERGENCY MEDICINE

## 2018-10-02 RX ORDER — METOCLOPRAMIDE HYDROCHLORIDE 5 MG/ML
10 INJECTION INTRAMUSCULAR; INTRAVENOUS ONCE AS NEEDED
Status: DISCONTINUED | OUTPATIENT
Start: 2018-10-02 | End: 2018-10-02 | Stop reason: HOSPADM

## 2018-10-02 RX ORDER — ONDANSETRON 2 MG/ML
INJECTION INTRAMUSCULAR; INTRAVENOUS AS NEEDED
Status: DISCONTINUED | OUTPATIENT
Start: 2018-10-02 | End: 2018-10-02 | Stop reason: SURG

## 2018-10-02 RX ORDER — SUCCINYLCHOLINE/SOD CL,ISO/PF 100 MG/5ML
SYRINGE (ML) INTRAVENOUS AS NEEDED
Status: DISCONTINUED | OUTPATIENT
Start: 2018-10-02 | End: 2018-10-02 | Stop reason: SURG

## 2018-10-02 RX ORDER — OMEPRAZOLE 40 MG/1
40 CAPSULE, DELAYED RELEASE ORAL DAILY
Qty: 30 CAPSULE | Refills: 3 | Status: SHIPPED | OUTPATIENT
Start: 2018-10-02 | End: 2018-11-08 | Stop reason: HOSPADM

## 2018-10-02 RX ORDER — ONDANSETRON 2 MG/ML
4 INJECTION INTRAMUSCULAR; INTRAVENOUS ONCE AS NEEDED
Status: DISCONTINUED | OUTPATIENT
Start: 2018-10-02 | End: 2018-10-02 | Stop reason: HOSPADM

## 2018-10-02 RX ORDER — SODIUM CHLORIDE 9 MG/ML
INJECTION, SOLUTION INTRAVENOUS CONTINUOUS PRN
Status: DISCONTINUED | OUTPATIENT
Start: 2018-10-02 | End: 2018-10-02 | Stop reason: SURG

## 2018-10-02 RX ORDER — PROPOFOL 10 MG/ML
INJECTION, EMULSION INTRAVENOUS AS NEEDED
Status: DISCONTINUED | OUTPATIENT
Start: 2018-10-02 | End: 2018-10-02 | Stop reason: SURG

## 2018-10-02 RX ADMIN — SODIUM CHLORIDE: 0.9 INJECTION, SOLUTION INTRAVENOUS at 16:17

## 2018-10-02 RX ADMIN — PROPOFOL 25 MG: 10 INJECTION, EMULSION INTRAVENOUS at 16:45

## 2018-10-02 RX ADMIN — ONDANSETRON 4 MG: 2 INJECTION INTRAMUSCULAR; INTRAVENOUS at 16:33

## 2018-10-02 RX ADMIN — Medication 100 MG: at 16:19

## 2018-10-02 RX ADMIN — PROPOFOL 150 MG: 10 INJECTION, EMULSION INTRAVENOUS at 16:19

## 2018-10-02 RX ADMIN — GLUCAGON HYDROCHLORIDE 1 MG: KIT at 14:10

## 2018-10-02 RX ADMIN — PROPOFOL 25 MG: 10 INJECTION, EMULSION INTRAVENOUS at 16:25

## 2018-10-02 RX ADMIN — SODIUM CHLORIDE 1000 ML: 0.9 INJECTION, SOLUTION INTRAVENOUS at 14:09

## 2018-10-02 RX ADMIN — GLUCAGON HYDROCHLORIDE 0.5 MG: KIT at 16:38

## 2018-10-02 RX ADMIN — LIDOCAINE HYDROCHLORIDE 100 MG: 20 INJECTION, SOLUTION INTRAVENOUS at 16:19

## 2018-10-02 NOTE — TELEPHONE ENCOUNTER
Patient explained to me he could not swallow  When he drink water it won't go down  Patient was advised to go to the ER by Dr Tamiko Kc

## 2018-10-02 NOTE — ANESTHESIA POSTPROCEDURE EVALUATION
Post-Op Assessment Note      CV Status:  Stable    Mental Status:  Awake    Hydration Status:  Stable    PONV Controlled:  None    Airway Patency:  Patent    Post Op Vitals Reviewed: Yes          Staff: CRNA           BP  157/1000   Temp      Pulse  106   Resp   22   SpO2   98% on 6LFM   Postop VS in PACU noted above, SV non-obstructed

## 2018-10-02 NOTE — ED PROVIDER NOTES
History  Chief Complaint   Patient presents with    Abdominal Pain     Patient stated that last night he was eating and feels like something is stuck  Everytime he drinks or eats he vomits  HPI     16-year-old male with history of hypertension, hyperlipidemia, diabetes, who presents for evaluation of feeling of "something stuck above my stomach   Patient was eating steak last night when he states he took too big a bite and did not drink water afterwards, and felt it get stuck right above my stomach    This has happened to him before but is usually very transient and resolved with drinking fluids, but this time has persisted throughout the entire day today  Reports instantly vomiting up anything he eats or drinks since this happened last night  Denies any pain in his chest or abdomen, except right after he tries to eat or drink something  Pain is described as a sharp cramping sensation, followed by vomiting  Denies blood in his vomit  No history of abdominal surgeries or GI bleed  Prior to Admission Medications   Prescriptions Last Dose Informant Patient Reported? Taking?    FLUoxetine (PROZAC) 40 MG capsule 10/1/2018 at Unknown time Self Yes Yes   Sig: Take 1 capsule by mouth daily   ONETOUCH DELICA LANCETS FINE MISC  Self Yes Yes   Sig: by Does not apply route   VENTOLIN  (90 Base) MCG/ACT inhaler  Self No Yes   Sig: INHALE 2 PUFFS BY MOUTH EVERY 4 HOURS AS NEEDED   aspirin (ECOTRIN LOW STRENGTH) 81 mg EC tablet 10/1/2018 at Unknown time Self Yes Yes   Sig: Take 1 tablet by mouth daily   atorvastatin (LIPITOR) 10 mg tablet 10/1/2018 at Unknown time Self Yes Yes   Sig: Take 1 tablet by mouth   busPIRone (BUSPAR) 10 mg tablet 10/1/2018 at Unknown time Self Yes Yes   Sig: Take 10 mg by mouth 2 (two) times a day   diltiazem (TIAZAC) 180 MG 24 hr capsule 10/1/2018 at Unknown time Self Yes Yes   Sig: Take 1 capsule by mouth daily   fluticasone-salmeterol (ADVAIR DISKUS) 250-50 mcg/dose inhaler 10/2/2018 at Unknown time Self Yes Yes   Sig: Inhale 1 puff 2 (two) times a day   lisinopril (ZESTRIL) 20 mg tablet 10/1/2018 at Unknown time  No Yes   Sig: TAKE 1 TABLET BY MOUTH EVERY DAY   metFORMIN (GLUCOPHAGE) 500 mg tablet 10/1/2018 at Unknown time Self Yes Yes   Sig: Take 1 tablet by mouth 2 (two) times a day   temazepam (RESTORIL) 30 mg capsule 10/1/2018 at Unknown time Self Yes Yes   Sig: Take 1 capsule by mouth   zolpidem (AMBIEN) 10 mg tablet 10/1/2018 at Unknown time Self Yes Yes      Facility-Administered Medications: None       Past Medical History:   Diagnosis Date    Abnormal EKG     Alcohol abuse     Allergic rhinitis     last assessed: 6/4/2014    Anxiety     Asthma     last assesssed: 6/4/2014    Atresia of esophagus with tracheo-esophageal fistula     Benign neoplasm of colon     Campylobacter enteritis     last assessed: 5/6/2015    Cardiac arrhythmia     last assessed: 6/19/2015    Chronic cough     last assessed: 1/29/2015    Chronic fatigue syndrome     resolved: 6/4/2014    Chronic sinusitis     resolved: 6/4/2014    COPD [facility]chronic obstructive pulmonary disease) ([facility] [address] [facility]     Diabetes mellitus ([facility] )     Digestive symptom     EKG, abnormal     Epistaxis     Generalized osteoarthritis     resolved: 6/4/2014    Heart disease     HTN (hypertension)     Hyperlipidemia     Myalgia     Myositis     Poisoning by drug or medicinal substance     Sleep apnea        Past Surgical History:   Procedure Laterality Date    COLONOSCOPY      FOOT SURGERY      TONSILLECTOMY         Family History   Problem Relation Age of Onset    Asthma Mother      I have reviewed and agree with the history as documented  Social History   Substance Use Topics    Smoking status: Former Smoker     Quit date: 10/2/2006    Smokeless tobacco: Never Used      Comment: Quit 12 years    Alcohol use No        Review of Systems   Constitutional: Negative for chills and fever     HENT: Negative for congestion and trouble swallowing  Eyes: Negative for visual disturbance  Respiratory: Negative for cough and shortness of breath  Cardiovascular: Negative for chest pain and leg swelling  Gastrointestinal: Positive for abdominal pain (only after oral intake), nausea (after attempted intake) and vomiting (after attempted intake)  Negative for diarrhea  Genitourinary: Negative for dysuria and frequency  Musculoskeletal: Negative for arthralgias, back pain, neck pain and neck stiffness  Skin: Negative for rash  Neurological: Negative for weakness, numbness and headaches  Psychiatric/Behavioral: Negative for agitation, behavioral problems and confusion  Physical Exam  Physical Exam   Constitutional: He is oriented to person, place, and time  He appears well-developed and well-nourished  No distress  HENT:   Head: Normocephalic and atraumatic  Right Ear: External ear normal    Left Ear: External ear normal    Nose: Nose normal    Mouth/Throat: Oropharynx is clear and moist    Eyes: Conjunctivae are normal    Neck: Normal range of motion  Neck supple  Cardiovascular: Normal rate, regular rhythm and normal heart sounds  Exam reveals no gallop and no friction rub  No murmur heard  Pulmonary/Chest: Effort normal and breath sounds normal  No respiratory distress  He has no wheezes  He has no rales  Abdominal: Soft  Bowel sounds are normal  He exhibits no distension  There is no tenderness  There is no guarding  Musculoskeletal: Normal range of motion  He exhibits no edema or deformity  Neurological: He is alert and oriented to person, place, and time  He exhibits normal muscle tone  Skin: Skin is warm and dry  He is not diaphoretic         Vital Signs  ED Triage Vitals   Temperature Pulse Respirations Blood Pressure SpO2   10/02/18 1343 10/02/18 1342 10/02/18 1342 10/02/18 1342 10/02/18 1342   98 2 °F (36 8 °C) 85 17 (!) 162/104 98 %      Temp Source Heart Rate Source Patient Position - Orthostatic VS BP Location FiO2 (%)   10/02/18 1343 10/02/18 1342 10/02/18 1342 10/02/18 1342 --   Oral Monitor Sitting Right arm       Pain Score       --                  Vitals:    10/02/18 1715 10/02/18 1732 10/02/18 1745 10/02/18 1800   BP: 144/85 (!) 171/102 151/93 150/100   Pulse: 97 98 95 93   Patient Position - Orthostatic VS:  Sitting         Visual Acuity      ED Medications  Medications   sodium chloride 0 9 % bolus 1,000 mL (0 mL Intravenous Stopped 10/2/18 1810)   glucagon (GLUCAGEN) injection 1 mg (1 mg Intravenous Given 10/2/18 1410)       Diagnostic Studies  Results Reviewed     Procedure Component Value Units Date/Time    Comprehensive metabolic panel [[id number]]  (Abnormal) Collected:  10/02/18 1409    Lab Status:  Final result Specimen:  Blood from Arm, Right Updated:  10/02/18 1444     Sodium 140 mmol/L      Potassium 4 2 mmol/L      Chloride 101 mmol/L      CO2 31 mmol/L      ANION GAP 8 mmol/L      BUN 17 mg/dL      Creatinine 0 97 mg/dL      Glucose 100 mg/dL      Calcium 9 8 mg/dL      AST 23 U/L      ALT 19 U/L      Alkaline Phosphatase 76 U/L      Total Protein 8 7 (H) g/dL      Albumin 4 3 g/dL      Total Bilirubin 0 60 mg/dL      eGFR 82 ml/min/1 73sq m     Narrative:         National Kidney Disease Education Program recommendations are as follows:  GFR calculation is accurate only with a steady state creatinine  Chronic Kidney disease less than 60 ml/min/1 73 sq  meters  Kidney failure less than 15 ml/min/1 73 sq  meters      CBC and differential [[id number]]  (Abnormal) Collected:  10/02/18 1409    Lab Status:  Final result Specimen:  Blood from Arm, Right Updated:  10/02/18 1420     WBC 9 26 Thousand/uL      RBC 5 68 (H) Million/uL      Hemoglobin 15 6 g/dL      Hematocrit 48 8 %      MCV 86 fL      MCH 27 5 pg      MCHC 32 0 g/dL      RDW 12 7 %      MPV 9 9 fL      Platelets 409 Thousands/uL      nRBC 0 /100 WBCs      Neutrophils Relative 64 %      Immat GRANS % 0 % Lymphocytes Relative 27 %      Monocytes Relative 7 %      Eosinophils Relative 1 %      Basophils Relative 1 %      Neutrophils Absolute 5 88 Thousands/µL      Immature Grans Absolute 0 02 Thousand/uL      Lymphocytes Absolute 2 54 Thousands/µL      Monocytes Absolute 0 67 Thousand/µL      Eosinophils Absolute 0 07 Thousand/µL      Basophils Absolute 0 08 Thousands/µL                  XR chest 2 views   Final Result by Promise Lorenzo MD (10/02 0838)      No acute cardiopulmonary disease  Workstation performed: [id number]                    Procedures  Procedures       Phone Contacts  ED Phone Contact    ED Course                               MDM  Number of Diagnoses or Management Options  Esophageal obstruction due to food impaction:   Diagnosis management comments: Generally well appearing  Afebrile and HDS  Pt with esophageal food impaction by history  Unable to tolerate oral intake in the ED  1 mg of glucagon given without relief  CXR, CBC, and CMP unremarkable  GI consulted, and pt brought for endoscopy for removal of food bolus  No other complaints at this time          Amount and/or Complexity of Data Reviewed  Clinical lab tests: ordered and reviewed  Tests in the radiology section of CPT®: ordered and reviewed  Discuss the patient with other providers: yes    Patient Progress  Patient progress: stable    CritCare Time    Disposition  Final diagnoses:   Esophageal obstruction due to food impaction     Time reflects when diagnosis was documented in both MDM as applicable and the Disposition within this note     Time User Action Codes Description Comment    10/2/2018  2:49 PM Sheila Colin [K22 [id number],  [id number]] Esophageal obstruction due to food impaction       ED Disposition     None      Follow-up Information    None         Discharge Medication List as of 10/2/2018  5:51 PM      START taking these medications    Details   omeprazole (PriLOSEC) 40 MG capsule Take 1 capsule (40 mg total) by mouth daily, Starting Tue 10/2/2018, Normal         CONTINUE these medications which have NOT CHANGED    Details   aspirin (ECOTRIN LOW STRENGTH) 81 mg EC tablet Take 1 tablet by mouth daily, Starting Wed 5/14/2014, Historical Med      atorvastatin (LIPITOR) 10 mg tablet Take 1 tablet by mouth, Starting Wed 5/14/2014, Historical Med      busPIRone (BUSPAR) 10 mg tablet Take 10 mg by mouth 2 (two) times a day, Starting Tue 5/29/2018, Historical Med      diltiazem (TIAZAC) 180 MG 24 hr capsule Take 1 capsule by mouth daily, Starting Fri 12/19/2014, Historical Med      FLUoxetine (PROZAC) 40 MG capsule Take 1 capsule by mouth daily, Starting Wed 5/14/2014, Historical Med      fluticasone-salmeterol (ADVAIR DISKUS) 250-50 mcg/dose inhaler Inhale 1 puff 2 (two) times a day, Starting Tue 1/21/2014, Historical Med      lisinopril (ZESTRIL) 20 mg tablet TAKE 1 TABLET BY MOUTH EVERY DAY, Normal      metFORMIN (GLUCOPHAGE) 500 mg tablet Take 1 tablet by mouth 2 (two) times a day, Starting Thu 3/27/2014, Historical Med      Chrisandre Ybarrare LANCETS FINE MISC by Does not apply route, Starting Tue 1/21/2014, Historical Med      temazepam (RESTORIL) 30 mg capsule Take 1 capsule by mouth, Starting Wed 5/14/2014, Historical Med      VENTOLIN  (90 Base) MCG/ACT inhaler INHALE 2 PUFFS BY MOUTH EVERY 4 HOURS AS NEEDED, Normal      zolpidem (AMBIEN) 10 mg tablet Starting Wed 1/17/2018, Historical Med           No discharge procedures on file      ED Provider  Electronically Signed by           Chucho Rhoades MD  10/03/18 92

## 2018-10-02 NOTE — CONSULTS
Consultation - [facility]) [facility]  Reginald Brooke 58 y o  male MRN: [id number]  Unit/Bed#: ED 17 Encounter: [id number]         Reason for Consult / Principal Problem:  Food impaction    HPI: Radha Thorpe is a 29-year-old male with history of COPD, hypertension, hyperlipidemia, diabetes and GERD  Patient presented to the emergency room today after feeling as though he had something stuck in his chest   He had flank state for dinner yesterday evening  Usually, the patient reports that he will have to drink copious amounts of water in order to get food down normally  Unfortunately, he does admit to not chewing thoroughly  Patient failed glucagon in the emergency room  He is not able to tolerate his secretions  Patient is unsure whether not he has had an EGD in the past   He has never had food impaction before  Patient denies abdominal pain or shortness of breath  Review of Systems:    CONSTITUTIONAL: Denies any fever, chills, or rigors  Good appetite, and no recent weight loss  HEENT: No earache or tinnitus  Denies hearing loss or visual disturbances  CARDIOVASCULAR: No chest pain or palpitations  RESPIRATORY: Denies any cough, hemoptysis, shortness of breath or dyspnea on exertion  GASTROINTESTINAL: As noted in the History of Present Illness  GENITOURINARY: No problems with urination  Denies any hematuria or dysuria  NEUROLOGIC: No dizziness or vertigo, denies headaches  MUSCULOSKELETAL: Denies any muscle or joint pain  SKIN: Denies skin rashes or itching  ENDOCRINE: Denies excessive thirst  Denies intolerance to heat or cold  PSYCHOSOCIAL: Denies depression or anxiety  Denies any recent memory loss         Historical Information   Past Medical History:   Diagnosis Date    Abnormal EKG     Alcohol abuse     Allergic rhinitis     last assessed: 6/4/2014    Anxiety     Asthma     last assesssed: 6/4/2014    Atresia of esophagus with tracheo-esophageal fistula     Benign neoplasm of colon     Campylobacter enteritis     last assessed: 5/6/2015    Cardiac arrhythmia     last assessed: 6/19/2015    Chronic cough     last assessed: 1/29/2015    Chronic fatigue syndrome     resolved: 6/4/2014    Chronic sinusitis     resolved: 6/4/2014    [facility] disease[facility]     Diabetes mellitus ([facility] )     Digestive symptom     EKG, abnormal     Epistaxis     Generalized osteoarthritis     resolved: 6/4/2014    Heart disease     HTN (hypertension)     Hyperlipidemia     Myalgia     Myositis     Poisoning by drug or medicinal substance      Past Surgical History:   Procedure Laterality Date    TONSILLECTOMY       Social History   History   Alcohol Use No     History   Drug Use No     History   Smoking Status    Former Smoker   Smokeless Tobacco    Never Used     Comment: Quit 12 years     Family History   Problem Relation Age of Onset    Asthma Mother         Meds/Allergies     Current Facility-Administered Medications   Medication Dose Route Frequency    sodium chloride 0 9 % bolus 1,000 mL  1,000 mL Intravenous Once       No Known Allergies      Objective     Blood pressure (!) 180/107, pulse 101, temperature 98 2 °F (36 8 °C), temperature source Oral, resp  rate 18, height 5' 4" (1 626 m), weight 71 5 kg (157 lb 10 1 oz), SpO2 97 %  No intake or output data in the 24 hours ending 10/02/18 1502      PHYSICAL EXAM:      General Appearance:   Alert and oriented x 3  Cooperative, and in no respiratory distress   HEENT:   Normocephalic, atraumatic, anicteric      Neck:  Supple, symmetrical, trachea midline   Lungs:   Clear to auscultation bilaterally; no rales, rhonchi or wheezing; respirations unlabored    Heart[de-identified]   S1 and S2 normal; regular rate and rhythm; no murmur, rub, or gallop     Abdomen:   Soft, non-tender, non-distended; normal bowel sounds; no masses, no organomegaly    Genitalia:   Deferred    Rectal:   Deferred    Extremities:  No cyanosis, clubbing or edema    Pulses:  2+ and symmetric all extremities    Skin:  Skin color, texture, turgor normal, no rashes or lesions    Lymph nodes:  No palpable cervical or supraclavicular lymphadenopathy        Lab Results:     Results from last 7 days  Lab Units 10/02/18  1409   WBC Thousand/uL 9 26   HEMOGLOBIN g/dL 15 6   HEMATOCRIT % 48 8   PLATELETS Thousands/uL 353   NEUTROS PCT % 64   LYMPHS PCT % 27   MONOS PCT % 7   EOS PCT % 1       Results from last 7 days  Lab Units 10/02/18  1409   SODIUM mmol/L 140   POTASSIUM mmol/L 4 2   CHLORIDE mmol/L 101   CO2 mmol/L 31   BUN mg/dL 17   CREATININE mg/dL 0 97   CALCIUM mg/dL 9 8   ALK PHOS U/L 76   ALT U/L 19   AST U/L 23                 ASSESSMENT and PLAN:      1) Food impaction - Patient reports that he has not been able to eat anything since dinner last night  He feels as though something is stuck in his chest   Patient has never had a food impaction before, however he does have difficulty swallowing and will require copious amounts of liquids with meals  Patient is on baby aspirin   - Plan for EGD   - Rule out esophageal structural abnormalities      The patient was seen and examined by Dr Maico Abdi, all perez medical decisions were made with Dr Maico Abdi  Thank you for allowing us to participate in the care of this pleasant patient  We will follow up with you closely

## 2018-10-02 NOTE — OP NOTE
ESOPHAGOGASTRODUODENOSCOPY    PROCEDURE: EGD    SEDATION: Monitored anesthesia care, check anesthesia records    ASA Class: 2    INDICATIONS: Food impaction  CONSENT:  Informed consent was obtained for the procedure, including sedation after explaining the risks and benefits of the procedure  Risks including but not limited to bleeding, perforation, infection, and missed lesion  PREPARATION:   Telemetry, pulse oximetry, blood pressure were monitored throughout the procedure  Patient was identified by myself both verbally and by visual inspection of ID band  DESCRIPTION:   Patient was placed in the left lateral decubitus position and was sedated with the above medication  The gastroscope was introduced in to the oropharynx and the esophagus was intubated under direct visualization  Scope was passed down the esophagus up to 2nd part of the duodenum  A careful inspection was made as the gastroscope was withdrawn, including a retroflexed view of the stomach; findings and interventions are described below  FINDINGS:    #1  Esophagus- large amounts of food bolus noted in the esophagus  This is a removed in piecemeal fashion using [place] net basket  There were no underlying strictures or rings  #2  Stomach- gastric mucosa appeared unremarkable, retroflexed view was normal     #3  Duodenum- duodenal mucosa was normal         IMPRESSIONS:      1  Large amounts of food bolus noted in the esophagus, removed in piecemeal fashion using [employer]  RECOMMENDATIONS:     1  Would recommend to start on PPI 40 mg daily  2  Follow-up in the office in 1 month  3  May need repeat EGD for biopsies for eosinophilic esophagitis if symptoms of dysphagia recur  4  Clear liquid diet for today, would recommend small meals, chew them well thereafter  COMPLICATIONS:  None; patient tolerated the procedure well            DISPOSITION: PACU           CONDITION: Stable

## 2018-10-02 NOTE — TELEPHONE ENCOUNTER
PT CALLED WANTED AN APPOINTMENT HE WAS HAVING TROUBLE SWALLOWING AND I TRANSFERRED  HIM TO [facility] WHICH HE SPOKE WITH DR CASEY ABOUT GOING TO THE ER

## 2018-10-02 NOTE — DISCHARGE INSTRUCTIONS
Esophageal Foreign Body   WHAT YOU NEED TO KNOW:   Esophageal foreign body is an object you swallowed that got stuck in your esophagus (throat)  Examples include dental work and button batteries  A piece of food or a fish bone can also become stuck in your esophagus  DISCHARGE INSTRUCTIONS:   Call 911 if:   · You have chest or abdominal pain, or shortness of breath  · You are choking  Return to the emergency department if:   · You have a fever  · You have more pain when you swallow  · You have severe vomiting  · Your vomit is bloody  · Your bowel movements are black or bloody  Contact your healthcare provider if:   · You do not find the object in your bowel movement within 2 or 3 days  · You have questions or concerns about your condition or care  Look for the object in your bowel movements:  Search for the dental work, battery, or other small, smooth object each time you have a bowel movement  Do not use laxatives or stool softeners  Do not force yourself to vomit  If you swallowed another object:   · Do not  stick your finger into your throat to try and remove an object  This could push the object even deeper  · Do  cough  You may be able to cough out the object  Follow up with your healthcare provider as directed: You may need to return for x-rays or other tests  Write down your questions so you remember to ask them during your visits  © 2017 [address] Information is for End User's use only and may not be sold, redistributed or otherwise used for commercial purposes  All illustrations and images included in CareNotes® are the copyrighted property of A D [employer] M [employer]  or Krystian Mccarthy  The above information is an  only  It is not intended as medical advice for individual conditions or treatments  Talk to your doctor, nurse or pharmacist before following any medical regimen to see if it is safe and effective for you

## 2018-10-02 NOTE — ANESTHESIA PREPROCEDURE EVALUATION
Review of Systems/Medical History  Patient summary reviewed  Chart reviewed  No history of anesthetic complications     Cardiovascular  Hyperlipidemia, Hypertension ,    Pulmonary  Asthma , well controlled/ stable Asthma type of rescue: daily inhaler, Sleep apnea CPAP,        GI/Hepatic    GERD ,             Endo/Other  Diabetes well controlled type 2 Oral agent,      GYN       Hematology   Musculoskeletal    Arthritis     Neurology   Psychology   Anxiety, Depression ,              Physical Exam    Airway    Mallampati score: II  TM Distance: >3 FB  Neck ROM: full     Dental   No notable dental hx     Cardiovascular      Pulmonary      Other Findings        Anesthesia Plan  ASA Score- 2 Emergent    Anesthesia Type- general with ASA Monitors  Additional Monitors:   Airway Plan: ETT  Comment: [place], risks of anesthesia including increased risk of aspiration discussed with pt  Plan Factors-    Induction- intravenous and rapid sequence induction  Postoperative Plan-     Informed Consent- Anesthetic plan and risks discussed with patient

## 2018-10-19 ENCOUNTER — HOSPITAL ENCOUNTER (OUTPATIENT)
Dept: NON INVASIVE DIAGNOSTICS | Facility: CLINIC | Age: 63
Discharge: HOME | End: 2018-10-19
Payer: MEDICARE

## 2018-10-19 DIAGNOSIS — Q22.5 EBSTEIN ANOMALY: ICD-10-CM

## 2018-10-19 PROCEDURE — 93306 TTE W/DOPPLER COMPLETE: CPT | Performed by: INTERNAL MEDICINE

## 2018-10-19 PROCEDURE — 93306 TTE W/DOPPLER COMPLETE: CPT

## 2018-10-24 ENCOUNTER — IMMUNIZATION (OUTPATIENT)
Dept: INTERNAL MEDICINE CLINIC | Facility: CLINIC | Age: 63
End: 2018-10-24
Payer: MEDICARE

## 2018-10-24 DIAGNOSIS — Z23 ENCOUNTER FOR IMMUNIZATION: ICD-10-CM

## 2018-10-24 PROCEDURE — 90471 IMMUNIZATION ADMIN: CPT

## 2018-10-24 PROCEDURE — 90682 RIV4 VACC RECOMBINANT DNA IM: CPT

## 2018-10-26 ENCOUNTER — TELEPHONE (OUTPATIENT)
Dept: CARDIOLOGY CLINIC | Facility: CLINIC | Age: 63
End: 2018-10-26

## 2018-10-26 NOTE — TELEPHONE ENCOUNTER
----- Message from Reynold Butcher PA-C sent at 10/26/2018  2:05 PM EDT -----  pls call and say echo is same as last time

## 2018-11-05 ENCOUNTER — TELEPHONE (OUTPATIENT)
Dept: GASTROENTEROLOGY | Facility: CLINIC | Age: 63
End: 2018-11-05

## 2018-11-05 PROBLEM — R13.19 ESOPHAGEAL DYSPHAGIA: Status: ACTIVE | Noted: 2018-11-05

## 2018-11-05 NOTE — TELEPHONE ENCOUNTER
This patient was in the hospital and had an EGD for food impaction with Migel on 10/2 he is booked for an OV with you on 11/20 can I direct him for a f/u EGD?

## 2018-11-08 ENCOUNTER — ANESTHESIA (OUTPATIENT)
Dept: PERIOP | Facility: HOSPITAL | Age: 63
End: 2018-11-08
Payer: MEDICARE

## 2018-11-08 ENCOUNTER — HOSPITAL ENCOUNTER (OUTPATIENT)
Facility: HOSPITAL | Age: 63
Setting detail: OUTPATIENT SURGERY
Discharge: HOME | End: 2018-11-08
Attending: INTERNAL MEDICINE | Admitting: INTERNAL MEDICINE
Payer: MEDICARE

## 2018-11-08 ENCOUNTER — ANESTHESIA EVENT (OUTPATIENT)
Dept: PERIOP | Facility: HOSPITAL | Age: 63
End: 2018-11-08
Payer: MEDICARE

## 2018-11-08 VITALS
SYSTOLIC BLOOD PRESSURE: 128 MMHG | HEIGHT: 65 IN | TEMPERATURE: 97.8 F | OXYGEN SATURATION: 96 % | RESPIRATION RATE: 18 BRPM | BODY MASS INDEX: 25.42 KG/M2 | HEART RATE: 64 BPM | WEIGHT: 152.56 LBS | DIASTOLIC BLOOD PRESSURE: 79 MMHG

## 2018-11-08 DIAGNOSIS — R13.19 ESOPHAGEAL DYSPHAGIA: ICD-10-CM

## 2018-11-08 LAB — GLUCOSE SERPL-MCNC: 92 MG/DL (ref 65–140)

## 2018-11-08 PROCEDURE — 88342 IMHCHEM/IMCYTCHM 1ST ANTB: CPT | Performed by: PATHOLOGY

## 2018-11-08 PROCEDURE — C1726 CATH, BAL DIL, NON-VASCULAR: HCPCS | Performed by: INTERNAL MEDICINE

## 2018-11-08 PROCEDURE — 88305 TISSUE EXAM BY PATHOLOGIST: CPT | Performed by: PATHOLOGY

## 2018-11-08 PROCEDURE — 43239 EGD BIOPSY SINGLE/MULTIPLE: CPT | Performed by: INTERNAL MEDICINE

## 2018-11-08 PROCEDURE — 82948 REAGENT STRIP/BLOOD GLUCOSE: CPT

## 2018-11-08 PROCEDURE — 43249 ESOPH EGD DILATION <30 MM: CPT | Performed by: INTERNAL MEDICINE

## 2018-11-08 RX ORDER — SODIUM CHLORIDE, SODIUM LACTATE, POTASSIUM CHLORIDE, CALCIUM CHLORIDE 600; 310; 30; 20 MG/100ML; MG/100ML; MG/100ML; MG/100ML
125 INJECTION, SOLUTION INTRAVENOUS CONTINUOUS
Status: DISCONTINUED | OUTPATIENT
Start: 2018-11-08 | End: 2018-11-08 | Stop reason: HOSPADM

## 2018-11-08 RX ORDER — PROPOFOL 10 MG/ML
INJECTION, EMULSION INTRAVENOUS AS NEEDED
Status: DISCONTINUED | OUTPATIENT
Start: 2018-11-08 | End: 2018-11-08 | Stop reason: SURG

## 2018-11-08 RX ORDER — OMEPRAZOLE 20 MG/1
20 CAPSULE, DELAYED RELEASE ORAL 2 TIMES DAILY
Qty: 60 CAPSULE | Refills: 3 | Status: SHIPPED | OUTPATIENT
Start: 2018-11-08 | End: 2019-01-24 | Stop reason: SDUPTHER

## 2018-11-08 RX ADMIN — PROPOFOL 20 MG: 10 INJECTION, EMULSION INTRAVENOUS at 11:06

## 2018-11-08 RX ADMIN — PROPOFOL 20 MG: 10 INJECTION, EMULSION INTRAVENOUS at 11:08

## 2018-11-08 RX ADMIN — PROPOFOL 120 MG: 10 INJECTION, EMULSION INTRAVENOUS at 11:04

## 2018-11-08 RX ADMIN — LIDOCAINE HYDROCHLORIDE 100 MG: 20 INJECTION, SOLUTION INTRAVENOUS at 11:04

## 2018-11-08 RX ADMIN — SODIUM CHLORIDE, SODIUM LACTATE, POTASSIUM CHLORIDE, AND CALCIUM CHLORIDE: .6; .31; .03; .02 INJECTION, SOLUTION INTRAVENOUS at 10:38

## 2018-11-08 NOTE — DISCHARGE INSTR - AVS FIRST PAGE
ESOPHAGOGASTRODUODENOSCOPY    PROCEDURE: EGD/ Biopsy    INDICATIONS: Dysphagia    POST-OP DIAGNOSIS: See the impression below    SEDATION: Monitored anesthesia care, check anesthesia records    PHYSICAL EXAM:  Vitals:    11/08/18 1032   BP: 139/93   Pulse: 60   Resp: 20   Temp: 98 °F (36 7 °C)   SpO2: 98%     Body mass index is 25 39 kg/m²  General: NAD  Heart: S1 & S2 normal, RRR  Lungs: CTA, No rales or rhonchi  Abdomen: Soft, nontender, nondistended, good bowel sounds    CONSENT:  Informed consent was obtained for the procedure, including sedation after explaining the risks and benefits of the procedure  Risks including but not limited to bleeding, perforation, infection, aspiration were discussed in detail  Also explained about less than 100% sensitivity with the exam and other alternatives  PREPARATION:   EKG tracing, pulse oximetry, blood pressure were monitored throughout the procedure  Patient was identified by myself both verbally and by visual inspection of ID band  DESCRIPTION:   Patient was placed in the left lateral decubitus position and was sedated with the above medication  The gastroscope was introduced in to the oropharynx and the esophagus was intubated under direct visualization  Scope was passed down the esophagus up to 2nd part of the duodenum  A careful inspection was made as the gastroscope was withdrawn, including a retroflexed view of the stomach; findings and interventions are described below  The blood loss was minimal     FINDINGS:    #1  Esophagus and GEJ- there was LA grade B reflux esophagitis at the GE junction  A Schatzki's ring was noted as well  It was dilated with an 18 mm to 20 mm TTS balloon without complication  #2  Stomach- a moderate nonerosive gastritis was noted in the body and antrum  Multiple biopsies were obtained    The cardia and fundus appeared normal     #3  Duodenum- the bulb and 2nd portion of the duodenum appeared normal   Multiple random biopsies were obtained  IMPRESSIONS:    Erosive esophagitis  Schatzki's ring status post dilation  Moderate nonerosive gastritis    RECOMMENDATIONS:   Ppi b i d  For 3 months  Await pathology  Reflux precautions    COMPLICATIONS:  None; patient tolerated the procedure well    DISPOSITION: PACU  CONDITION: Stable    Nichelle Nam MD  11/8/2018,11:12 AM

## 2018-11-08 NOTE — ANESTHESIA POSTPROCEDURE EVALUATION
Post-Op Assessment Note      CV Status:  Stable    Hydration Status:  Stable    PONV Controlled:  None    Airway Patency:  Patent    Post Op Vitals Reviewed: Yes          Staff: CRNA           BP   108/70   Temp      Pulse  71   Resp   16   SpO2   95% on RA   Post procedure VS noted above, SV non obstructed

## 2018-11-08 NOTE — DISCHARGE INSTRUCTIONS
Upper Endoscopy   WHAT YOU NEED TO KNOW:   An upper endoscopy is also called an upper gastrointestinal (GI) endoscopy, or an esophagogastroduodenoscopy (EGD)  You may feel bloated, gassy, or have some abdominal discomfort after your procedure  Your throat may be sore for 24 to 36 hours  You may burp or pass gas from air that is still inside your body  DISCHARGE INSTRUCTIONS:   Call 911 for any of the following:   · You have sudden chest pain or trouble breathing  Seek care immediately if:   · You feel dizzy or faint  · You have trouble swallowing  · Your bowel movements are very dark or black  · Your abdomen is hard and firm and you have severe pain  · You vomit blood  Contact your healthcare provider if:   · You feel full or bloated and cannot burp or pass gas  · You have not had a bowel movement for 3 days after your procedure  · You have neck pain  · You have a fever or chills  · You have nausea or are vomiting  · You have a rash or hives  · You have questions or concerns about your endoscopy  Relieve a sore throat:  Suck on throat lozenges or crushed ice  Gargle with a small amount of warm salt water  Mix 1 teaspoon of salt and 1 cup of warm water to make salt water  Relieve gas and discomfort from bloating:  Lie on your right side with a heating pad on your abdomen  Take short walks to help pass gas  Eat small meals until bloating is relieved  Rest after your procedure: You have been given medicine to relax you  Do not  drive or make important decisions until the day after your procedure  Return to your normal activity as directed  You can usually return to work the day after your procedure  Follow up with your healthcare provider as directed:  Write down your questions so you remember to ask them during your visits     © 2017 [address] is for End User's use only and may not be sold, redistributed or otherwise used for commercial purposes  All illustrations and images included in CareNotes® are the copyrighted property of [employer] D [employer] M [employer] Inc  or Krystian Mccarthy  The above information is an  only  It is not intended as medical advice for individual conditions or treatments  Talk to your doctor, nurse or pharmacist before following any medical regimen to see if it is safe and effective for you

## 2018-11-08 NOTE — ANESTHESIA PREPROCEDURE EVALUATION
Review of Systems/Medical History  Patient summary reviewed  Chart reviewed  No history of anesthetic complications     Cardiovascular  Exercise tolerance (METS): >4,  Hyperlipidemia, Hypertension controlled,    Pulmonary  Asthma , well controlled/ stable Asthma type of rescue: daily inhaler, Sleep apnea CPAP,        GI/Hepatic    GERD poorly controlled,             Endo/Other  Diabetes well controlled type 2 Oral agent,      GYN       Hematology   Musculoskeletal    Arthritis     Neurology   Psychology   Anxiety, Depression ,          hx food bolus 10/2/18, grade 3 view with MAC 3    Physical Exam    Airway    Mallampati score: II  TM Distance: >3 FB  Neck ROM: full     Dental   No notable dental hx     Cardiovascular      Pulmonary      Other Findings        Anesthesia Plan  ASA Score- 2     Anesthesia Type- IV sedation with anesthesia with ASA Monitors  Additional Monitors:   Airway Plan:         Plan Factors-    Induction- intravenous  Postoperative Plan-     Informed Consent- Anesthetic plan and risks discussed with patient  I personally reviewed this patient with the CRNA  Discussed and agreed on the Anesthesia Plan with the CRNA  Debbie Louisa

## 2018-11-08 NOTE — OP NOTE
ESOPHAGOGASTRODUODENOSCOPY    PROCEDURE: EGD/ Biopsy    INDICATIONS: Dysphagia    POST-OP DIAGNOSIS: See the impression below    SEDATION: Monitored anesthesia care, check anesthesia records    PHYSICAL EXAM:  Vitals:    11/08/18 1032   BP: 139/93   Pulse: 60   Resp: 20   Temp: 98 °F (36 7 °C)   SpO2: 98%     Body mass index is 25 39 kg/m²  General: NAD  Heart: S1 & S2 normal, RRR  Lungs: CTA, No rales or rhonchi  Abdomen: Soft, nontender, nondistended, good bowel sounds    CONSENT:  Informed consent was obtained for the procedure, including sedation after explaining the risks and benefits of the procedure  Risks including but not limited to bleeding, perforation, infection, aspiration were discussed in detail  Also explained about less than 100% sensitivity with the exam and other alternatives  PREPARATION:   EKG tracing, pulse oximetry, blood pressure were monitored throughout the procedure  Patient was identified by myself both verbally and by visual inspection of ID band  DESCRIPTION:   Patient was placed in the left lateral decubitus position and was sedated with the above medication  The gastroscope was introduced in to the oropharynx and the esophagus was intubated under direct visualization  Scope was passed down the esophagus up to 2nd part of the duodenum  A careful inspection was made as the gastroscope was withdrawn, including a retroflexed view of the stomach; findings and interventions are described below  The blood loss was minimal     FINDINGS:    #1  Esophagus and GEJ- there was LA grade B reflux esophagitis at the GE junction  A Schatzki's ring was noted as well  It was dilated with an 18 mm to 20 mm TTS balloon without complication  #2  Stomach- a moderate nonerosive gastritis was noted in the body and antrum  Multiple biopsies were obtained    The cardia and fundus appeared normal     #3  Duodenum- the bulb and 2nd portion of the duodenum appeared normal   Multiple random biopsies were obtained  IMPRESSIONS:    Erosive esophagitis  Schatzki's ring status post dilation  Moderate nonerosive gastritis    RECOMMENDATIONS:   Ppi b i d  For 3 months  Await pathology  Reflux precautions    COMPLICATIONS:  None; patient tolerated the procedure well    DISPOSITION: PACU  CONDITION: Stable    Micah Lowery MD  11/8/2018,11:12 AM

## 2018-11-09 ENCOUNTER — TELEPHONE (OUTPATIENT)
Dept: GASTROENTEROLOGY | Facility: CLINIC | Age: 63
End: 2018-11-09

## 2018-11-09 NOTE — TELEPHONE ENCOUNTER
----- Message from Lesia Smith MD sent at 11/9/2018  2:32 PM EST -----  pls tell him path is negative

## 2018-11-13 ENCOUNTER — TELEPHONE (OUTPATIENT)
Dept: GASTROENTEROLOGY | Facility: CLINIC | Age: 63
End: 2018-11-13

## 2018-11-14 DIAGNOSIS — J44.9 COPD MIXED TYPE (HCC): Primary | ICD-10-CM

## 2018-11-21 DIAGNOSIS — E11.9 TYPE 2 DIABETES MELLITUS WITHOUT COMPLICATION, WITHOUT LONG-TERM CURRENT USE OF INSULIN (HCC): Primary | ICD-10-CM

## 2018-11-28 DIAGNOSIS — I10 ESSENTIAL HYPERTENSION: ICD-10-CM

## 2018-12-03 RX ORDER — LISINOPRIL 20 MG/1
TABLET ORAL
Qty: 90 TABLET | Refills: 0 | Status: SHIPPED | OUTPATIENT
Start: 2018-12-03 | End: 2018-12-04 | Stop reason: SDUPTHER

## 2018-12-04 DIAGNOSIS — E78.2 MIXED HYPERLIPIDEMIA: Primary | ICD-10-CM

## 2018-12-04 DIAGNOSIS — I10 ESSENTIAL HYPERTENSION: ICD-10-CM

## 2018-12-04 RX ORDER — LISINOPRIL 20 MG/1
20 TABLET ORAL DAILY
Qty: 90 TABLET | Refills: 3 | Status: SHIPPED | OUTPATIENT
Start: 2018-12-04 | End: 2020-02-17

## 2018-12-04 RX ORDER — DILTIAZEM HYDROCHLORIDE 180 MG/1
180 CAPSULE, EXTENDED RELEASE ORAL DAILY
Qty: 90 CAPSULE | Refills: 3 | Status: SHIPPED | OUTPATIENT
Start: 2018-12-04 | End: 2020-01-06

## 2018-12-04 RX ORDER — ATORVASTATIN CALCIUM 10 MG/1
10 TABLET, FILM COATED ORAL DAILY
Qty: 90 TABLET | Refills: 3 | Status: SHIPPED | OUTPATIENT
Start: 2018-12-04 | End: 2019-11-23 | Stop reason: SDUPTHER

## 2018-12-04 NOTE — TELEPHONE ENCOUNTER
Patient needs RX's for        Atorvastatin  Lisinopril  Diltiazem    [facility]

## 2019-01-21 ENCOUNTER — OFFICE VISIT (OUTPATIENT)
Dept: CARDIOLOGY CLINIC | Facility: CLINIC | Age: 64
End: 2019-01-21
Payer: MEDICARE

## 2019-01-21 VITALS
BODY MASS INDEX: 26.99 KG/M2 | SYSTOLIC BLOOD PRESSURE: 124 MMHG | HEART RATE: 88 BPM | WEIGHT: 162 LBS | HEIGHT: 65 IN | DIASTOLIC BLOOD PRESSURE: 82 MMHG | OXYGEN SATURATION: 94 %

## 2019-01-21 DIAGNOSIS — I11.9 HYPERTENSIVE HEART DISEASE WITHOUT HEART FAILURE: ICD-10-CM

## 2019-01-21 DIAGNOSIS — I51.89 DIASTOLIC DYSFUNCTION: ICD-10-CM

## 2019-01-21 DIAGNOSIS — Q22.5 EBSTEIN ANOMALY: Primary | ICD-10-CM

## 2019-01-21 DIAGNOSIS — I37.1 NONRHEUMATIC PULMONARY VALVE INSUFFICIENCY: ICD-10-CM

## 2019-01-21 DIAGNOSIS — E78.2 MIXED HYPERLIPIDEMIA: ICD-10-CM

## 2019-01-21 DIAGNOSIS — I10 ESSENTIAL HYPERTENSION: ICD-10-CM

## 2019-01-21 PROCEDURE — 99214 OFFICE O/P EST MOD 30 MIN: CPT | Performed by: INTERNAL MEDICINE

## 2019-01-21 NOTE — PROGRESS NOTES
CARDIOLOGY OFFICE VISIT  [facility]  Annamaria Campa, Michelle, [address] [place] [address], [place], [facility] Elsa Siddiqui  Tel: [phone]      NAME: Sue Doll  AGE: 58 y o  SEX: male  : 1955   MRN: [id number]      Chief Complaint:  Chief Complaint   Patient presents with    Follow-up     6 month HTN,HHD w/o HF         History of Present Illness:   Patient comes for follow up  States he is doing well from cardiac stand point and denies chest pain / pressure, SOB, palpitations, lightheadedness, syncope, swelling feet, orthopnea, PND, claudication  HTN, HHD, DD -  Has been hypertensive for many years  Taking medications regularly  Denies lightheadedness, headache, medication side effects  HLP -  Has had hyperlipidemia for many years  Taking statin regularly along with diet control  Denies myalgia  PCP closely monitoring the blood work  Ebstein's anomaly, pulmonary regurgitation - stable, asymptomatic  Denies history of atrial fibrillation   States he exercises regularly on treadmill without any problems      Past Medical History:  Past Medical History:   Diagnosis Date    Abnormal EKG     Alcohol abuse     Allergic rhinitis     last assessed: 2014    Anxiety     Asthma     last assesssed: 2014    Atresia of esophagus with tracheo-esophageal fistula     Benign neoplasm of colon     Campylobacter enteritis     last assessed: 2015    Cardiac arrhythmia     last assessed: 2015    Chronic cough     last assessed: 2015    Chronic fatigue syndrome     resolved: 2014    Chronic sinusitis     resolved: 2014    COPD (chronic obstructive pulmonary disease) [facility] [address] [facility]     Diabetes mellitus ([facility]     Digestive symptom     EKG, abnormal     Epistaxis     Generalized osteoarthritis     resolved: 2014    Heart disease     HTN (hypertension)     Hyperlipidemia     Myalgia     Myositis     Poisoning by drug or medicinal substance     Sleep apnea          Past Surgical History:  Past Surgical History:   Procedure Laterality Date    COLONOSCOPY      ESOPHAGOGASTRODUODENOSCOPY N/A 10/2/2018    Procedure: ESOPHAGOGASTRODUODENOSCOPY (EGD); Surgeon: Tanya Novoa MD;  Location: [facility];  Service: Gastroenterology    FOOT SURGERY      IN ESOPHAGOGASTRODUODENOSCOPY TRANSORAL DIAGNOSTIC N/A 11/8/2018    Procedure: ESOPHAGOGASTRODUODENOSCOPY (EGD); Surgeon: Lori Nelson MD;  Location: [facility];   Service: Gastroenterology    TONSILLECTOMY           Family History:  Family History   Problem Relation Age of Onset    Asthma Mother          Social History:  Social History     Social History    Marital status: /Civil Union     Spouse name: N/A    Number of children: N/A    Years of education: N/A     Social History Main Topics    Smoking status: Former Smoker     Quit date: 10/2/2006    Smokeless tobacco: Never Used      Comment: Quit 12 years    Alcohol use No      Comment: rarely    Drug use: No    Sexual activity: Yes     Partners: Female     Other Topics Concern    Not on file     Social History Narrative    No narrative on file         Active Problems:  Patient Active Problem List   Diagnosis    Generalized anxiety disorder    Cardiac arrhythmia    Chronic obstructive pulmonary disease ([address]    Recurrent major depressive disorder, in partial remission ([address]    Diastolic dysfunction    Ebstein anomaly    GERD without esophagitis    Mixed hyperlipidemia    Essential hypertension    Hypertensive heart disease    Nonrheumatic pulmonary valve insufficiency    Overweight    Tricuspid valve disorders, non-rheumatic    Type 2 diabetes mellitus, without long-term current use of insulin ([address] )    Esophageal obstruction due to food impaction    Esophageal dysphagia         The following portions of the patient's history were reviewed and updated as appropriate: past medical history, past surgical history, past family history,  past social history, current medications, allergies and problem list       Review of Systems:  Constitutional: Denies fever, chills, fatigue  Eyes: Denies eye redness, eye discharge, double vision  ENT: Denies hearing loss, tinnitus, sneezing, nasal discharge, sore throat   Respiratory: Denies cough, expectoration, hemoptysis, shortness of breath  Cardiovascular: Denies chest pain, palpitations, orthopnea, PND, lower extremity swelling  Gastrointestinal: Denies abdominal pain, nausea, vomiting, hematemesis, diarrhea, bloody stools  Genito-Urinary: Denies dysuria, incontinence  Musculoskeletal: Denies back pain, joint pain, muscle pain  Neurologic: Denies confusion, lightheadedness, syncope, headache, focal weakness, sensory changes, seizures  Endocrine: Denies polyuria, polydipsia, temperature intolerance  Allergy and Immunology: Denies hives, insect bite sensitivity  Hematological and Lymphatic: Denies bleeding problems, swollen glands   Psychological: Denies depression, suicidal ideation, anxiety, panic  Dermatological: Denies pruritus, rash, skin lesion changes      Vitals:  Vitals:    01/21/19 1311   BP: 124/82   Pulse: 88   SpO2: 94%       Body mass index is 26 96 kg/m²  Weight (last 2 days)     Date/Time   Weight    01/21/19 1311  73 5 (162)                Physical Examination:  General: Patient is not in acute distress  Awake, alert, oriented in time, place and person  Responding to commands  Head: Normocephalic  Atraumatic  Eyes: Both pupils normal sized, round and reactive to light  Nonicteric  ENT: Normal external ear canals  Nares normal, no drainage  Lips and oral mucosa normal  Neck: Supple  JVP not raised  Trachea central  No thyromegaly  Lungs: Bilateral bronchovascular breath sounds with no crackles or rhonchi  Chest wall: No tenderness  Cardiovascular: RRR  S1 and S2 normal  No murmur, rub or gallop  Gastrointestinal: Abdomen soft, nontender   No guarding or rigidity  Liver and spleen not palpable  Bowel sounds present  Neurologic: Patient is awake, alert, oriented in time, place and person  Responding to command  Moving all extremities  Integumentary:  No skin rash  Lymphatic: No cervical lymphadenopathy  Back: Symmetric   No CVA tenderness  Extremities: No clubbing, cyanosis or edema      Laboratory Results:  CBC with diff:   Lab Results   Component Value Date    WBC 9 26 10/02/2018    WBC 8 33 10/19/2015    RBC 5 68 (H) 10/02/2018    RBC 5 46 10/19/2015    HGB 15 6 10/02/2018    HGB 14 8 10/19/2015    HCT 48 8 10/02/2018    HCT 44 9 10/19/2015    MCV 86 10/02/2018    MCV 82 10/19/2015    MCH 27 5 10/02/2018    MCH 27 1 10/19/2015    RDW 12 7 10/02/2018    RDW 13 0 10/19/2015     10/02/2018     10/19/2015       CMP:  Lab Results   Component Value Date    CREATININE 0 97 10/02/2018    CREATININE 0 85 10/19/2015    BUN 17 10/02/2018    BUN 10 10/19/2015     10/19/2015    K 4 2 10/02/2018    K 3 6 10/19/2015     10/02/2018     10/19/2015    CO2 31 10/02/2018    CO2 27 10/19/2015    GLUCOSE 142 (H) 10/19/2015    PROT 7 3 10/19/2015    ALKPHOS 76 10/02/2018    ALKPHOS 87 10/19/2015    ALT 19 10/02/2018    ALT 17 10/19/2015    AST 23 10/02/2018    AST 17 10/19/2015       Lab Results   Component Value Date    HGBA1C 6 2 08/13/2018    HGBA1C 6 7 (H) 10/19/2015       Lipid Profile:   Lab Results   Component Value Date    CHOL 139 10/19/2015    CHOL 156 05/30/2015    CHOL 192 01/06/2015     Lab Results   Component Value Date    HDL 67 (H) 08/13/2018    HDL 55 03/27/2018    HDL 63 (H) 11/17/2017     Lab Results   Component Value Date    LDLCALC 74 08/13/2018    LDLCALC 71 03/27/2018    LDLCALC 51 11/17/2017     Lab Results   Component Value Date    TRIG 136 08/13/2018    TRIG 108 03/27/2018    TRIG 142 11/17/2017       Cardiac testing:   Results for orders placed during the hospital encounter of 10/19/18   Echo complete with contrast if indicated Narrative [facility], [address] [facility]  [phone]    Transthoracic Echocardiogram  2D, M-mode, and Color Doppler    Study date:  19-Oct-2018    Patient: Nirmal Meyer  MR number: [id number]  Account number: [de-identified]  : 1955  Age: 58 years  Gender: Male  Status: Outpatient  Location: [facility]  Height: 64 in  Weight: 157 lb  BP: 137/ 76 mmHg    Indications: Ebstein's anomaly    Diagnoses: Q22 5 - Ebstein's anomaly    Sonographer:  Muller RCS  Interpreting Physician:  Loan Nolasco MD  Primary Physician:  Brittny Fields MD  Referring Physician:  Loan Nolasco MD  Group:  [facility]    SUMMARY    LEFT VENTRICLE:  Systolic function was normal  Ejection fraction was estimated to be 60 %  There were no regional wall motion abnormalities  Doppler parameters were consistent with abnormal left ventricular relaxation (grade 1 diastolic dysfunction)  RIGHT VENTRICLE:  Systolic function was normal     MITRAL VALVE:  There was trace regurgitation  TRICUSPID VALVE:  There was mild apical displacement suggestive of mild Ebstein's anamoly  There was trace regurgitation  Pulmonary artery systolic pressure was within the normal range  PULMONIC VALVE:  There was trace regurgitation  HISTORY: PRIOR HISTORY: Ebstein's anomaly  Medication-treated hyperlipidemia  Risk factors: hypertension and diabetes  Chronic lung disease  Abnormal ECG  PROCEDURE: The study was performed in the [address] [place] [address]  This was a routine study  The transthoracic approach was used  The study included complete 2D imaging, M-mode, and color Doppler  The heart rate was 79 bpm, at the  start of the study  Images were obtained from the parasternal, apical, subcostal, and suprasternal notch acoustic windows  Image quality was adequate      LEFT VENTRICLE: Size was normal  Systolic function was normal  Ejection fraction was estimated to be 60 %  There were no regional wall motion abnormalities  Wall thickness was normal  No evidence of apical thrombus  DOPPLER: Doppler  parameters were consistent with abnormal left ventricular relaxation (grade 1 diastolic dysfunction)  RIGHT VENTRICLE: The size was normal  Systolic function was normal  Wall thickness was normal     LEFT ATRIUM: Size was normal     RIGHT ATRIUM: Size was normal     MITRAL VALVE: Valve structure was normal  There was normal leaflet separation  DOPPLER: The transmitral velocity was within the normal range  There was no evidence for stenosis  There was trace regurgitation  AORTIC VALVE: The valve was trileaflet  Leaflets exhibited normal thickness and normal cuspal separation  DOPPLER: Transaortic velocity was within the normal range  There was no evidence for stenosis  There was no significant  regurgitation  TRICUSPID VALVE: There was normal leaflet separation  There was mild apical displacement suggestive of mild Ebstein's anamoly  DOPPLER: The transtricuspid velocity was within the normal range  There was no evidence for stenosis  There was  trace regurgitation  Pulmonary artery systolic pressure was within the normal range  PULMONIC VALVE: Leaflets exhibited normal thickness, no calcification, and normal cuspal separation  DOPPLER: The transpulmonic velocity was within the normal range  There was trace regurgitation  PERICARDIUM: There was no pericardial effusion  The pericardium was normal in appearance  AORTA: The root exhibited normal size  SYSTEMIC VEINS: IVC: The inferior vena cava was not well visualized  SYSTEM MEASUREMENT TABLES    Apical four chamber  4 chamber Left Atrium Volume Index; Planimetry; End Systole; Apical four chamber;: 13 66 cm2  Left Ventricular Diastolic Area; Method of Disks, Single Plane; End Diastole; Apical four chamber;: 31 26 cm2  Left Ventricular Ejection Fraction; Method of Disks, Single Plane;  Apical four chamber;: 60 3 %  Left Ventricular systolic Area; Method of Disks, Single Plane; End Systole; Apical four chamber;: 17 94 cm2  Right Atrium Systolic Area; Planimetry; End Systole; Apical four chamber;: 13 95 cm2  Right Ventricular Internal Diastolic Dimension; End Diastole; Apical four chamber;: 37 8 mm  TAPSE: 21 7 mm    Unspecified Scan Mode  Aortic Root Diameter; End Systole;: 38 1 mm  Gradient Pressure, Peak; Simplified Bernoulli; Antegrade Flow; Systole;: 5 7 mm[Hg]  Gradient pressure, average; Simplified Bernoulli; Antegrade Flow; Systole;: 3 5 mm[Hg]  Left atrial diameter; End Diastole;: 40 5 mm  Cardiac Output; Teichholz; Systole;: 5 43 L/min  Heart rate; Teichholz;: 94 /min  Interventricular Septum Diastolic Thickness; Teichholz; End Diastole;: 12 2 mm  Left Ventricle Internal End Diastolic Dimension; Teichholz;: 45 8 mm  Left Ventricle Internal Systolic Dimension; Teichholz; End Systole;: 31 2 mm  Left Ventricle Mass; Mass AVCube with Teichholz; End Diastole;: 185 g  Left Ventricle Posterior Wall Diastolic Thickness; Teichholz; End Diastole;: 10 2 mm  Left Ventricular Ejection Fraction; Teichholz;: 60 %  Left Ventricular End Diastolic Volume; Teichholz;: 96 3 ml  Left Ventricular End Systolic Volume; Teichholz;: 38 5 ml  Left Ventricular Fractional Shortening;: 31 9 %  Stroke volume; Teichholz; Systole;: 57 8 ml  Mitral Valve Area; Area by Pressure Half-Time; Systole;: 3 01 cm2  Mitral Valve E to A Ratio; Systole;: 0 52  Pressure half time; Diastole;: 0 07 s  Maximum Tricuspid valve regurgitation pressure gradient; Regurgitant Flow; Systole;: 27 9 mm[Hg]    [facility]    Prepared and electronically signed by    Ross Monroe MD  Signed 19-Oct-2018 13:18:16         Medications:    Current Outpatient Prescriptions:     ADVAIR DISKUS 250-50 MCG/DOSE inhaler, INHALE 1 PUFF TWICE DAILY  , Disp: 1 Inhaler, Rfl: 3    aspirin (ECOTRIN LOW STRENGTH) 81 mg EC tablet, Take 1 tablet by mouth daily, Disp: , Rfl:     atorvastatin (LIPITOR) 10 mg tablet, Take 1 tablet (10 mg total) by mouth daily, Disp: 90 tablet, Rfl: 3    busPIRone (BUSPAR) 10 mg tablet, Take 10 mg by mouth 2 (two) times a day, Disp: , Rfl: 3    diltiazem (TIAZAC) 180 MG 24 hr capsule, Take 1 capsule (180 mg total) by mouth daily, Disp: 90 capsule, Rfl: 3    FLUoxetine (PROZAC) 40 MG capsule, Take 1 capsule by mouth daily, Disp: , Rfl:     lisinopril (ZESTRIL) 20 mg tablet, Take 1 tablet (20 mg total) by mouth daily, Disp: 90 tablet, Rfl: 3    metFORMIN (GLUCOPHAGE) 500 mg tablet, TAKE 1 TABLET BY MOUTH TWICE DAILY  , Disp: 180 tablet, Rfl: 3    omeprazole (PriLOSEC) 20 mg delayed release capsule, Take 1 capsule (20 mg total) by mouth 2 (two) times a day, Disp: 60 capsule, Rfl: 3    ONETOUCH DELICA LANCETS FINE MISC, by Does not apply route, Disp: , Rfl:     temazepam (RESTORIL) 30 mg capsule, Take 1 capsule by mouth, Disp: , Rfl:     VENTOLIN  (90 Base) MCG/ACT inhaler, INHALE 2 PUFFS BY MOUTH EVERY 4 HOURS AS NEEDED, Disp: 1 Inhaler, Rfl: 3    zolpidem (AMBIEN) 10 mg tablet, , Disp: , Rfl:       Allergies:  No Known Allergies      Assessment and Plan:  1  Ebstein anomaly  Mild  Stable  Asymptomatic  Follow-up with serial echocardiogram    2  Essential hypertension, Hypertensive heart disease without heart failure, Diastolic dysfunction  BP stable  Continue current medications    3  Mixed hyperlipidemia  Continue statin and diet control  His PCP closely monitors his blood work    4  Nonrheumatic pulmonary valve insufficiency  Stable per last echocardiogram    Recommend aggressive risk factor modification and therapeutic lifestyle changes  Low-salt, low-calorie, low-fat, low-cholesterol diet with regular exercise and to optimize weight    I will defer the ordering and monitoring of necessity lab studies to you, but I am available and happy to review and manage any of the data at your request in the future  Discussed concepts of atherosclerosis, including signs and symptoms of cardiac disease  Previous studies were reviewed  Safety measures were reviewed  Questions were entertained and answered  Patient was advised to report any problems requiring medical attention  Follow-up with PCP and appropriate specialist and lab work as discussed  Return for follow up visit as scheduled or earlier, if needed  Thank you for allowing me to participate in the care and evaluation of your patient  Should you have any questions, please feel free to contact me        Angeli Guzman MD  1/21/2019,1:36 PM

## 2019-01-24 DIAGNOSIS — R13.19 ESOPHAGEAL DYSPHAGIA: ICD-10-CM

## 2019-01-24 RX ORDER — OMEPRAZOLE 20 MG/1
20 CAPSULE, DELAYED RELEASE ORAL DAILY
Qty: 30 CAPSULE | Refills: 0 | Status: SHIPPED | OUTPATIENT
Start: 2019-01-24 | End: 2019-03-06 | Stop reason: SDUPTHER

## 2019-01-24 NOTE — TELEPHONE ENCOUNTER
Reviewed with Essie Marinelli  Will send Rx for 30 days of omeprazole 20mg PO daily, given hx of Schatzki's ring  Please schedule for outpatient appt to re-evaluation symptoms and consider PPI taper

## 2019-01-31 ENCOUNTER — TELEPHONE (OUTPATIENT)
Dept: INTERNAL MEDICINE CLINIC | Facility: CLINIC | Age: 64
End: 2019-01-31

## 2019-01-31 NOTE — TELEPHONE ENCOUNTER
Dr Martha Love at the [facility] ([place], [place]) won't rx temazepam - phone# [phone]    Pt would like to know if Dr Kristopher Noe will rx temazepam    Please advise, call back

## 2019-02-20 ENCOUNTER — APPOINTMENT (OUTPATIENT)
Dept: LAB | Facility: CLINIC | Age: 64
End: 2019-02-20
Payer: MEDICARE

## 2019-02-20 DIAGNOSIS — E78.2 MIXED HYPERLIPIDEMIA: ICD-10-CM

## 2019-02-20 DIAGNOSIS — E11.9 TYPE 2 DIABETES MELLITUS WITHOUT COMPLICATION, WITHOUT LONG-TERM CURRENT USE OF INSULIN (HCC): ICD-10-CM

## 2019-02-20 LAB
ALBUMIN SERPL BCP-MCNC: 3.9 G/DL (ref 3.5–5)
ALP SERPL-CCNC: 83 U/L (ref 46–116)
ALT SERPL W P-5'-P-CCNC: 14 U/L (ref 12–78)
ANION GAP SERPL CALCULATED.3IONS-SCNC: 6 MMOL/L (ref 4–13)
AST SERPL W P-5'-P-CCNC: 18 U/L (ref 5–45)
BASOPHILS # BLD AUTO: 0.04 THOUSANDS/ΜL (ref 0–0.1)
BASOPHILS NFR BLD AUTO: 1 % (ref 0–1)
BILIRUB SERPL-MCNC: 0.55 MG/DL (ref 0.2–1)
BUN SERPL-MCNC: 16 MG/DL (ref 5–25)
CALCIUM SERPL-MCNC: 9 MG/DL (ref 8.3–10.1)
CHLORIDE SERPL-SCNC: 104 MMOL/L (ref 100–108)
CHOLEST SERPL-MCNC: 163 MG/DL (ref 50–200)
CO2 SERPL-SCNC: 28 MMOL/L (ref 21–32)
CREAT SERPL-MCNC: 0.96 MG/DL (ref 0.6–1.3)
EOSINOPHIL # BLD AUTO: 0.31 THOUSAND/ΜL (ref 0–0.61)
EOSINOPHIL NFR BLD AUTO: 4 % (ref 0–6)
ERYTHROCYTE [DISTWIDTH] IN BLOOD BY AUTOMATED COUNT: 12.4 % (ref 11.6–15.1)
EST. AVERAGE GLUCOSE BLD GHB EST-MCNC: 128 MG/DL
GFR SERPL CREATININE-BSD FRML MDRD: 83 ML/MIN/1.73SQ M
GLUCOSE P FAST SERPL-MCNC: 110 MG/DL (ref 65–99)
HBA1C MFR BLD: 6.1 % (ref 4.2–6.3)
HCT VFR BLD AUTO: 48 % (ref 36.5–49.3)
HDLC SERPL-MCNC: 77 MG/DL (ref 40–60)
HGB BLD-MCNC: 15.5 G/DL (ref 12–17)
IMM GRANULOCYTES # BLD AUTO: 0.02 THOUSAND/UL (ref 0–0.2)
IMM GRANULOCYTES NFR BLD AUTO: 0 % (ref 0–2)
LDLC SERPL CALC-MCNC: 73 MG/DL (ref 0–100)
LYMPHOCYTES # BLD AUTO: 2.56 THOUSANDS/ΜL (ref 0.6–4.47)
LYMPHOCYTES NFR BLD AUTO: 29 % (ref 14–44)
MCH RBC QN AUTO: 27.7 PG (ref 26.8–34.3)
MCHC RBC AUTO-ENTMCNC: 32.3 G/DL (ref 31.4–37.4)
MCV RBC AUTO: 86 FL (ref 82–98)
MONOCYTES # BLD AUTO: 0.82 THOUSAND/ΜL (ref 0.17–1.22)
MONOCYTES NFR BLD AUTO: 9 % (ref 4–12)
NEUTROPHILS # BLD AUTO: 4.97 THOUSANDS/ΜL (ref 1.85–7.62)
NEUTS SEG NFR BLD AUTO: 57 % (ref 43–75)
NONHDLC SERPL-MCNC: 86 MG/DL
NRBC BLD AUTO-RTO: 0 /100 WBCS
PLATELET # BLD AUTO: 291 THOUSANDS/UL (ref 149–390)
PMV BLD AUTO: 11.1 FL (ref 8.9–12.7)
POTASSIUM SERPL-SCNC: 4.2 MMOL/L (ref 3.5–5.3)
PROT SERPL-MCNC: 7.5 G/DL (ref 6.4–8.2)
RBC # BLD AUTO: 5.6 MILLION/UL (ref 3.88–5.62)
SODIUM SERPL-SCNC: 138 MMOL/L (ref 136–145)
TRIGL SERPL-MCNC: 63 MG/DL
TSH SERPL DL<=0.05 MIU/L-ACNC: 1.17 UIU/ML (ref 0.36–3.74)
WBC # BLD AUTO: 8.72 THOUSAND/UL (ref 4.31–10.16)

## 2019-02-20 PROCEDURE — 85025 COMPLETE CBC W/AUTO DIFF WBC: CPT

## 2019-02-20 PROCEDURE — 80061 LIPID PANEL: CPT

## 2019-02-20 PROCEDURE — 80053 COMPREHEN METABOLIC PANEL: CPT

## 2019-02-20 PROCEDURE — 83036 HEMOGLOBIN GLYCOSYLATED A1C: CPT

## 2019-02-20 PROCEDURE — 84443 ASSAY THYROID STIM HORMONE: CPT

## 2019-02-20 PROCEDURE — 36415 COLL VENOUS BLD VENIPUNCTURE: CPT

## 2019-02-22 ENCOUNTER — HOSPITAL ENCOUNTER (EMERGENCY)
Facility: HOSPITAL | Age: 64
Discharge: HOME | End: 2019-02-22
Attending: EMERGENCY MEDICINE | Admitting: EMERGENCY MEDICINE
Payer: MEDICARE

## 2019-02-22 ENCOUNTER — LAB REQUISITION (OUTPATIENT)
Dept: LAB | Facility: HOSPITAL | Age: 64
End: 2019-02-22
Payer: MEDICARE

## 2019-02-22 VITALS
SYSTOLIC BLOOD PRESSURE: 142 MMHG | TEMPERATURE: 97.7 F | BODY MASS INDEX: 26.38 KG/M2 | OXYGEN SATURATION: 92 % | HEART RATE: 83 BPM | DIASTOLIC BLOOD PRESSURE: 68 MMHG | RESPIRATION RATE: 20 BRPM | WEIGHT: 158.51 LBS

## 2019-02-22 DIAGNOSIS — Z02.83 ENCOUNTER FOR BLOOD-ALCOHOL AND BLOOD-DRUG TEST: ICD-10-CM
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
## 2019-02-22 DIAGNOSIS — F10.920 ALCOHOLIC INTOXICATION WITHOUT COMPLICATION (HCC): Primary | ICD-10-CM

## 2019-02-22 PROCEDURE — 99284 EMERGENCY DEPT VISIT MOD MDM: CPT
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
## 2019-02-23 NOTE — ED NOTES
Pt is ambulating to the bathroom and in the hallway with steady gait     Gladys Wade, ANABELLE  02/22/19 9341

## 2019-02-25 ENCOUNTER — OFFICE VISIT (OUTPATIENT)
Dept: INTERNAL MEDICINE CLINIC | Facility: CLINIC | Age: 64
End: 2019-02-25
Payer: MEDICARE

## 2019-02-25 ENCOUNTER — OFFICE VISIT (OUTPATIENT)
Dept: GASTROENTEROLOGY | Facility: CLINIC | Age: 64
End: 2019-02-25
Payer: MEDICARE

## 2019-02-25 VITALS
DIASTOLIC BLOOD PRESSURE: 84 MMHG | BODY MASS INDEX: 25.79 KG/M2 | WEIGHT: 155 LBS | HEART RATE: 81 BPM | SYSTOLIC BLOOD PRESSURE: 130 MMHG

## 2019-02-25 VITALS
OXYGEN SATURATION: 98 % | DIASTOLIC BLOOD PRESSURE: 90 MMHG | BODY MASS INDEX: 25.83 KG/M2 | HEART RATE: 81 BPM | SYSTOLIC BLOOD PRESSURE: 150 MMHG | WEIGHT: 155 LBS | HEIGHT: 65 IN

## 2019-02-25 DIAGNOSIS — F33.41 RECURRENT MAJOR DEPRESSIVE DISORDER, IN PARTIAL REMISSION (HCC): ICD-10-CM

## 2019-02-25 DIAGNOSIS — E78.2 MIXED HYPERLIPIDEMIA: ICD-10-CM

## 2019-02-25 DIAGNOSIS — K21.9 GERD WITHOUT ESOPHAGITIS: ICD-10-CM

## 2019-02-25 DIAGNOSIS — J44.9 COPD MIXED TYPE (HCC): ICD-10-CM

## 2019-02-25 DIAGNOSIS — I10 ESSENTIAL HYPERTENSION: ICD-10-CM

## 2019-02-25 DIAGNOSIS — E11.9 TYPE 2 DIABETES MELLITUS WITHOUT COMPLICATION, WITHOUT LONG-TERM CURRENT USE OF INSULIN (HCC): Primary | ICD-10-CM

## 2019-02-25 DIAGNOSIS — F41.1 GENERALIZED ANXIETY DISORDER: ICD-10-CM

## 2019-02-25 DIAGNOSIS — R13.19 ESOPHAGEAL DYSPHAGIA: Primary | ICD-10-CM
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
## 2019-02-25 PROBLEM — K22.2 ESOPHAGEAL OBSTRUCTION DUE TO FOOD IMPACTION: Status: RESOLVED | Noted: 2018-10-02 | Resolved: 2019-02-25

## 2019-02-25 PROBLEM — T18.128A ESOPHAGEAL OBSTRUCTION DUE TO FOOD IMPACTION: Status: RESOLVED | Noted: 2018-10-02 | Resolved: 2019-02-25

## 2019-02-25 LAB
LEFT EYE DIABETIC RETINOPATHY: NORMAL
LEFT EYE IMAGE QUALITY: NORMAL
LEFT EYE MACULAR EDEMA: NORMAL
LEFT EYE OTHER RETINOPATHY: NORMAL
RIGHT EYE DIABETIC RETINOPATHY: NORMAL
RIGHT EYE IMAGE QUALITY: NORMAL
RIGHT EYE MACULAR EDEMA: NORMAL
RIGHT EYE OTHER RETINOPATHY: NORMAL
SEVERITY (EYE EXAM): NORMAL

## 2019-02-25 PROCEDURE — 92250 FUNDUS PHOTOGRAPHY W/I&R: CPT | Performed by: INTERNAL MEDICINE

## 2019-02-25 PROCEDURE — 99214 OFFICE O/P EST MOD 30 MIN: CPT | Performed by: INTERNAL MEDICINE

## 2019-02-25 NOTE — PROGRESS NOTES
Follow-up Note -  [facility]  Reginald Brooke 1955 58 y o  male     ASSESSMENT @ PLAN:   He is a 68-year-old male with dysphagia secondary to gastroesophageal reflux disease and Schatzki's ring status post food impactions and dilations who is much better and reports no symptoms  1 I told him to continue on the omeprazole 20 mg daily indefinitely    2 I told him to call us and we will do a dilation as needed; he can be added on at any time for an EGD with dilation for complaints of dysphagia given his history    Reason:   GERD and dysphagia    HPI:   He is a 68-year-old male status post food impaction secondary to reflux erosive esophagitis and a Schatzki's ring  He had had food impactions in the past   He reports that he is asymptomatic after his last procedure  He had endoscopy with me then that showed LA grade B reflux esophagitis and a dilation with an 18-20 balloon  He reports he is having no problems  He is not getting food stuck  He denies heartburn regurgitation he has no nausea vomiting no melena no NSAID use  He reports the food is not a problem  He reports he was unaware the problem before  REVIEW OF SYSTEMS:     CONSTITUTIONAL: Denies any fever, chills, or rigors  Good appetite, and no recent weight loss  HEENT: No earache or tinnitus  Denies hearing loss or visual disturbances  CARDIOVASCULAR: No chest pain or palpitations  RESPIRATORY: Denies any cough, hemoptysis, shortness of breath or dyspnea on exertion  GASTROINTESTINAL: As noted in the History of Present Illness  GENITOURINARY: No problems with urination  Denies any hematuria or dysuria  NEUROLOGIC: No dizziness or vertigo, denies headaches  MUSCULOSKELETAL: Denies any muscle or joint pain  SKIN: Denies skin rashes or itching  ENDOCRINE: Denies excessive thirst  Denies intolerance to heat or cold  PSYCHOSOCIAL: Denies depression or anxiety  Denies any recent memory loss       Past Medical History: Diagnosis Date    Abnormal EKG     Alcohol abuse     Allergic rhinitis     last assessed: 2014    Anxiety     Asthma     last assesssed: 2014    Atresia of esophagus with tracheo-esophageal fistula     Benign neoplasm of colon     Campylobacter enteritis     last assessed: 2015    Cardiac arrhythmia     last assessed: 2015    Chronic cough     last assessed: 2015    Chronic fatigue syndrome     resolved: 2014    Chronic sinusitis     resolved: 2014    [facility]     Diabetes mellitus ([facility] )     Digestive symptom     EKG, abnormal     Epistaxis     Generalized osteoarthritis     resolved: 2014    Heart disease     HTN (hypertension)     Hyperlipidemia     Myalgia     Myositis     Poisoning by drug or medicinal substance     Sleep apnea       Past Surgical History:   Procedure Laterality Date    COLONOSCOPY      ESOPHAGOGASTRODUODENOSCOPY N/A 10/2/2018    Procedure: ESOPHAGOGASTRODUODENOSCOPY (EGD); Surgeon: Zelda Buckner MD;  [facility];  Service: Gastroenterology    FOOT SURGERY      [place] ESOPHAGOGASTRODUODENOSCOPY TRANSORAL DIAGNOSTIC N/A 2018    Procedure: ESOPHAGOGASTRODUODENOSCOPY (EGD); Surgeon: Mary Tierney MD;  [facility];   Service: Gastroenterology    TONSILLECTOMY       Social History     Socioeconomic History    Marital status: Single     Spouse name: Not on file    Number of children: Not on file    Years of education: Not on file    Highest education level: Not on file   Occupational History    Not on file   Social Needs    Financial resource strain: Not on file    Food insecurity:     Worry: Not on file     Inability: Not on file    Transportation needs:     Medical: Not on file     Non-medical: Not on file   Tobacco Use    Smoking status: Former Smoker     Packs/day: 0 00     Last attempt to quit: 10/2/2006     Years since quittin 4    Smokeless tobacco: Never Used    Tobacco comment: cigars-social   Substance and Sexual Activity    Alcohol use: No     Comment: rarely    Drug use: No    Sexual activity: Yes     Partners: Female   Lifestyle    Physical activity:     Days per week: 3 days     Minutes per session: 40 min    Stress: To some extent   Relationships    Social connections:     Talks on phone: Not on file     Gets together: Not on file     Attends Che[employer] service: Not on file     Active member of club or organization: Not on file     Attends meetings of clubs or organizations: Not on file     Relationship status: Not on file    Intimate partner violence:     Fear of current or ex partner: Not on file     Emotionally abused: Not on file     Physically abused: Not on file     Forced sexual activity: Not on file   Other Topics Concern    Not on file   Social History Narrative    Not on file     Family History   Problem Relation Age of Onset    Asthma Mother      Patient has no known allergies  Current Outpatient Medications   Medication Sig Dispense Refill    ADVAIR DISKUS 250-50 MCG/DOSE inhaler INHALE 1 PUFF TWICE DAILY  1 Inhaler 3    aspirin (ECOTRIN LOW STRENGTH) 81 mg EC tablet Take 1 tablet by mouth daily      atorvastatin (LIPITOR) 10 mg tablet Take 1 tablet (10 mg total) by mouth daily 90 tablet 3    busPIRone (BUSPAR) 10 mg tablet Take 10 mg by mouth 2 (two) times a day  3    diltiazem (TIAZAC) 180 MG 24 hr capsule Take 1 capsule (180 mg total) by mouth daily 90 capsule 3    FLUoxetine (PROZAC) 40 MG capsule Take 1 capsule by mouth daily      lisinopril (ZESTRIL) 20 mg tablet Take 1 tablet (20 mg total) by mouth daily 90 tablet 3    metFORMIN (GLUCOPHAGE) 500 mg tablet TAKE 1 TABLET BY MOUTH TWICE DAILY   180 tablet 3    omeprazole (PriLOSEC) 20 mg delayed release capsule Take 1 capsule (20 mg total) by mouth daily for 30 days 30 capsule 0    ONETOUCH DELICA LANCETS FINE MISC by Does not apply route      temazepam (RESTORIL) 30 mg capsule Take 1 capsule by mouth      VENTOLIN  (90 Base) MCG/ACT inhaler INHALE 2 PUFFS BY MOUTH EVERY 4 HOURS AS NEEDED 1 Inhaler 3    zolpidem (AMBIEN) 10 mg tablet        No current facility-administered medications for this visit  Blood pressure 130/84, pulse 81, weight 70 3 kg (155 lb)  PHYSICAL EXAM:     General Appearance:   Alert, cooperative, no distress, appears stated age    HEENT:   Normocephalic, atraumatic, anicteric      Neck:  Supple, symmetrical, trachea midline, no adenopathy;    thyroid: no enlargement/tenderness/nodules; no carotid  bruit or JVD    Lungs:   Clear to auscultation bilaterally; no rales, rhonchi or wheezing; respirations unlabored    Heart[de-identified]   S1 and S2 normal; regular rate and rhythm; no murmur, rub, or gallop     Abdomen:   Soft, non-tender, non-distended; normal bowel sounds; no masses, no organomegaly    Genitalia:   Deferred    Rectal:   Deferred    Extremities:  No cyanosis, clubbing or edema    Pulses:  2+ and symmetric all extremities    Skin:  Skin color, texture, turgor normal, no rashes or lesions    Lymph nodes:  No palpable cervical, axillary or inguinal lymphadenopathy        Lab Results   Component Value Date    WBC 8 72 02/20/2019    HGB 15 5 02/20/2019    HCT 48 0 02/20/2019    MCV 86 02/20/2019     02/20/2019     Lab Results   Component Value Date    GLUCOSE 142 (H) 10/19/2015    CALCIUM 9 0 02/20/2019     10/19/2015    K 4 2 02/20/2019    CO2 28 02/20/2019     02/20/2019    BUN 16 02/20/2019    CREATININE 0 96 02/20/2019     Lab Results   Component Value Date    ALT 14 02/20/2019    AST 18 02/20/2019    ALKPHOS 83 02/20/2019    BILITOT 0 83 10/19/2015     No results found for: INR, PROTIME

## 2019-02-25 NOTE — PROGRESS NOTES
INTERNAL MEDICINE OFFICE VISIT  [address]  Tel: [phone]      NAME: Jose Mendoza  AGE: 58 y o  SEX: male  : 1955   MRN: [id number]    DATE: 2019  TIME: 11:14 AM      Assessment and Plan:  1  Type 2 diabetes mellitus without complication, without long-term current use of insulin (HCC)  Well controlled, continue same medication  - IRIS Diabetic eye exam  - CBC and differential; Future  - Comprehensive metabolic panel; Future  - TSH, 3rd generation; Future  - Hemoglobin A1C; Future  - Microalbumin / creatinine urine ratio; Future    2  COPD mixed type ([address] )  Continue inhalers    3  Essential hypertension  Continue medication    4  Mixed hyperlipidemia  Continue atorvastatin  - Lipid panel; Future    5  Recurrent major depressive disorder, in partial remission (HCC)  Stable, continue Prozac    6  Generalized anxiety disorder  Continue BuSpar    7  GERD without esophagitis  Continue omeprazole  He was told to limit the use of alcohol      - Counseling Documentation: patient was counseled regarding: diagnostic results, instructions for management, risk factor reductions, prognosis, patient and family education, impressions, risks and benefits of treatment options and importance of compliance with treatment  - Medication Side Effects: Adverse side effects of medications were reviewed with the patient/guardian today  Return for follow up visit in 6 months or earlier, if needed  Chief Complaint:  Chief Complaint   Patient presents with    Follow-up     6 month follow up         History of Present Illness:   Type 2 diabetes-well controlled with a hemoglobin A1c of 6 1  COPD-has been breathing okay and takes inhalers as advised  Hypertension blood pressure stable on present medication  Hyperlipidemia-he takes atorvastatin regularly    Depression and anxiety-has been taking medication but still has symptoms off and on  GERD-he has been taking omeprazole and is following up with GI  He was recently in the ER with alcohol intoxication again  Active Problem List:  Patient Active Problem List   Diagnosis    Generalized anxiety disorder    Cardiac arrhythmia    Chronic obstructive pulmonary disease (HCC)    Recurrent major depressive disorder, [facility] partial remission [facility]    Diastolic dysfunction    Ebstein anomaly    GERD without esophagitis    Mixed hyperlipidemia    Essential hypertension    Hypertensive heart disease    Nonrheumatic pulmonary valve insufficiency    Overweight    Tricuspid valve disorders, non-rheumatic    Type 2 diabetes mellitus, without long-term current use of insulin ([facility])    Esophageal obstruction due to food impaction    Esophageal dysphagia         Past Medical History:  Past Medical History:   Diagnosis Date    Abnormal EKG     Alcohol abuse     Allergic rhinitis     last assessed: 6/4/2014    Anxiety     Asthma     last assesssed: 6/4/2014    Atresia of esophagus with tracheo-esophageal fistula     Benign neoplasm of colon     Campylobacter enteritis     last assessed: 5/6/2015    Cardiac arrhythmia     last assessed: 6/19/2015    Chronic cough     last assessed: 1/29/2015    Chronic fatigue syndrome     resolved: 6/4/2014    Chronic sinusitis     resolved: 6/4/2014    COPD (chronic obstructive pulmonary disease) ([facility] )     Diabetes mellitus ([facility] )     Digestive symptom     EKG, abnormal     Epistaxis     Generalized osteoarthritis     resolved: 6/4/2014    Heart disease     HTN (hypertension)     Hyperlipidemia     Myalgia     Myositis     Poisoning by drug or medicinal substance     Sleep apnea          Past Surgical History:  Past Surgical History:   Procedure Laterality Date    COLONOSCOPY      ESOPHAGOGASTRODUODENOSCOPY N/A 10/2/2018    Procedure: ESOPHAGOGASTRODUODENOSCOPY (EGD);   Surgeon: Shameka Lima MD;  Location: [facility];  Service: Gastroenterology    FOOT SURGERY      OR ESOPHAGOGASTRODUODENOSCOPY TRANSORAL DIAGNOSTIC N/A 2018    Procedure: ESOPHAGOGASTRODUODENOSCOPY (EGD); Surgeon: Adele Jj MD;  Location: [facility]; Service: Gastroenterology    TONSILLECTOMY           Family History:  Family History   Problem Relation Age of Onset    Asthma Mother          Social History:  Social History     Socioeconomic History    Marital status: Single     Spouse name: None    Number of children: None    Years of education: None    Highest education level: None   Occupational History    None   Social Needs    Financial resource strain: None    Food insecurity:     Worry: None     Inability: None    Transportation needs:     Medical: None     Non-medical: None   Tobacco Use    Smoking status: Former Smoker     Packs/day: 0 00     Last attempt to quit: 10/2/2006     Years since quittin 4    Smokeless tobacco: Never Used    Tobacco comment: cigars-social   Substance and Sexual Activity    Alcohol use: No     Comment: rarely    Drug use: No    Sexual activity: Yes     Partners: Female   Lifestyle    Physical activity:     Days per week: 3 days     Minutes per session: 40 min    Stress: To some extent   Relationships    Social connections:     Talks on phone: None     Gets together: None     Attends Orthodoxy service: None     Active member of club or organization: None     Attends meetings of clubs or organizations: None     Relationship status: None    Intimate partner violence:     Fear of current or ex partner: None     Emotionally abused: None     Physically abused: None     Forced sexual activity: None   Other Topics Concern    None   Social History Narrative    None         Allergies:  No Known Allergies      Medications:    Current Outpatient Medications:     ADVAIR DISKUS 250-50 MCG/DOSE inhaler, INHALE 1 PUFF TWICE DAILY  , Disp: 1 Inhaler, Rfl: 3    aspirin (ECOTRIN LOW STRENGTH) 81 mg EC tablet, Take 1 tablet by mouth daily, Disp: , Rfl:     atorvastatin (LIPITOR) 10 mg tablet, Take 1 tablet (10 mg total) by mouth daily, Disp: 90 tablet, Rfl: 3    busPIRone (BUSPAR) 10 mg tablet, Take 10 mg by mouth 2 (two) times a day, Disp: , Rfl: 3    diltiazem (TIAZAC) 180 MG 24 hr capsule, Take 1 capsule (180 mg total) by mouth daily, Disp: 90 capsule, Rfl: 3    FLUoxetine (PROZAC) 40 MG capsule, Take 1 capsule by mouth daily, Disp: , Rfl:     lisinopril (ZESTRIL) 20 mg tablet, Take 1 tablet (20 mg total) by mouth daily, Disp: 90 tablet, Rfl: 3    metFORMIN (GLUCOPHAGE) 500 mg tablet, TAKE 1 TABLET BY MOUTH TWICE DAILY  , Disp: 180 tablet, Rfl: 3    omeprazole (PriLOSEC) 20 mg delayed release capsule, Take 1 capsule (20 mg total) by mouth daily for 30 days, Disp: 30 capsule, Rfl: 0    ONETOUCH DELICA LANCETS FINE MISC, by Does not apply route, Disp: , Rfl:     temazepam (RESTORIL) 30 mg capsule, Take 1 capsule by mouth, Disp: , Rfl:     VENTOLIN  (90 Base) MCG/ACT inhaler, INHALE 2 PUFFS BY MOUTH EVERY 4 HOURS AS NEEDED, Disp: 1 Inhaler, Rfl: 3    zolpidem (AMBIEN) 10 mg tablet, , Disp: , Rfl:       The following portions of the patient's history were reviewed and updated as appropriate: past medical history, past surgical history, family history, social history, allergies, current medications and active problem list       Review of Systems:  Constitutional: Denies fever, chills, weight gain, weight loss, fatigue  Eyes: Denies eye redness, eye discharge, double vision, change in visual acuity  ENT: Denies hearing loss, tinnitus, sneezing, nasal congestion, nasal discharge, sore throat   Respiratory: Denies cough, expectoration, hemoptysis, shortness of breath, wheezing  Cardiovascular: Denies chest pain, palpitations, lower extremity swelling, orthopnea, PND  Gastrointestinal: Denies abdominal pain, heartburn, nausea, vomiting, hematemesis, diarrhea, bloody stools  Genito-Urinary: Denies dysuria, frequency, difficulty in micturition, nocturia, incontinence  Musculoskeletal: Denies back pain, joint pain, muscle pain  Neurologic: Denies confusion, lightheadedness, syncope, headache, focal weakness, sensory changes, seizures  Endocrine: Denies polyuria, polydipsia, temperature intolerance  Allergy and Immunology: Denies hives, insect bite sensitivity  Hematological and Lymphatic: Denies bleeding problems, swollen glands   Psychological: Denies depression, suicidal ideation, anxiety, panic, mood swings  Dermatological: Denies pruritus, rash, skin lesion changes      Vitals:  Vitals:    02/25/19 1058   BP: 150/90   Pulse: 81   SpO2: 98%       Body mass index is 25 79 kg/m²  Weight (last 2 days)     Date/Time   Weight    02/25/19 1058   70 3 (155)                Physical Examination:  General: Patient is not in acute distress  Awake, alert, responding to commands  No weight gain or loss  Head: Normocephalic  Atraumatic  Eyes: Conjunctiva and lids with no swelling, erythema or discharge  Both pupils normal sized, round and reactive to light  Sclera nonicteric  ENT: External examination of nose and ear normal  Otoscopic examination shows translucent tympanic membranes with patent canals without erythema  Oropharynx moist with no erythema, edema, exudate or lesions  Neck: Supple  JVP not raised  Trachea midline  No masses  No thyromegaly  Lungs: No signs of increased work of breathing or respiratory distress  Bilateral bronchovascular breath sounds with no crackles or rhonchi  Chest wall: No tenderness  Cardiovascular: Normal PMI  No thrills  Regular rate and rhythm  S1 and S2 normal  No murmur, rub or gallop  Gastrointestinal: Abdomen soft, nontender  No guarding or rigidity  Liver and spleen not palpable  Bowel sounds present  Neurologic: Cranial nerves II-XII intact   Cortical functions normal  Motor system - Reflexes 2+ and symmetrical  Sensations normal  Musculoskeletal: Gait normal  No joint tenderness  Integumentary: Skin normal with no rash or lesions  Lymphatic: No palpable lymph nodes in neck, axilla or groin  Extremities: No clubbing, cyanosis, edema or varicosities  Psychological: Judgement and insight normal  Mood and affect normal      Laboratory Results:  CBC with diff:   Lab Results   Component Value Date    WBC 8 72 02/20/2019    WBC 8 33 10/19/2015    RBC 5 60 02/20/2019    RBC 5 46 10/19/2015    HGB 15 5 02/20/2019    HGB 14 8 10/19/2015    HCT 48 0 02/20/2019    HCT 44 9 10/19/2015    MCV 86 02/20/2019    MCV 82 10/19/2015    MCH 27 7 02/20/2019    MCH 27 1 10/19/2015    RDW 12 4 02/20/2019    RDW 13 0 10/19/2015     02/20/2019     10/19/2015       CMP:  Lab Results   Component Value Date    CREATININE 0 96 02/20/2019    CREATININE 0 85 10/19/2015    BUN 16 02/20/2019    BUN 10 10/19/2015     10/19/2015    K 4 2 02/20/2019    K 3 6 10/19/2015     02/20/2019     10/19/2015    CO2 28 02/20/2019    CO2 27 10/19/2015    GLUCOSE 142 (H) 10/19/2015    PROT 7 3 10/19/2015    ALKPHOS 83 02/20/2019    ALKPHOS 87 10/19/2015    ALT 14 02/20/2019    ALT 17 10/19/2015    AST 18 02/20/2019    AST 17 10/19/2015       Lab Results   Component Value Date    HGBA1C 6 1 02/20/2019    HGBA1C 6 7 (H) 10/19/2015       No results found for: TROPONINI, CKMB, CKTOTAL    Lipid Profile:   Lab Results   Component Value Date    CHOL 139 10/19/2015    CHOL 156 05/30/2015     Lab Results   Component Value Date    HDL 77 (H) 02/20/2019    HDL 67 (H) 08/13/2018     Lab Results   Component Value Date    LDLCALC 73 02/20/2019    LDLCALC 74 08/13/2018     Lab Results   Component Value Date    TRIG 63 02/20/2019    TRIG 136 08/13/2018         Health Maintenance:  Health Maintenance   Topic Date Due    Hepatitis C Screening  1955    BMI: Followup Plan  05/05/1972    HEPATITIS B VACCINES (1 of 3 - Risk 3-dose series) 05/05/1973    DTaP,Tdap,and Td Vaccines (1 - Tdap) 05/05/1975    DM Eye Exam  06/29/2018    Medicare Annual Wellness Visit (AWV)  04/10/2019    Diabetic Foot Exam  04/10/2019    URINE MICROALBUMIN  08/13/2019    HEMOGLOBIN A1C  08/20/2019    BMI: Adult  02/22/2020    CRC Screening: Colonoscopy  06/10/2025    INFLUENZA VACCINE  Completed    Pneumococcal PPSV23 Medium Risk Adult  Completed     Immunization History   Administered Date(s) Administered    INFLUENZA 10/28/2016, 10/24/2018    Influenza Quadrivalent, 6-35 Months IM 10/06/2014, 10/28/2016, 09/08/2017    Influenza TIV (IM) 10/21/2013, 10/06/2015    Influenza, recombinant, quadrivalent,injectable, preservative free 10/24/2018    Pneumococcal Polysaccharide PPV23 06/04/2014, 02/27/2017    Tdap 1955    Zoster 01/01/2014         Fidel Cordon MD  2/25/2019,11:14 AM

## 2019-02-26 ENCOUNTER — TELEPHONE (OUTPATIENT)
Dept: INTERNAL MEDICINE CLINIC | Facility: CLINIC | Age: 64
End: 2019-02-26

## 2019-03-04 DIAGNOSIS — J44.9 CHRONIC OBSTRUCTIVE PULMONARY DISEASE, UNSPECIFIED COPD TYPE (HCC): ICD-10-CM

## 2019-03-04 RX ORDER — ALBUTEROL SULFATE 90 UG/1
2 AEROSOL, METERED RESPIRATORY (INHALATION) EVERY 4 HOURS PRN
Qty: 1 INHALER | Refills: 3 | Status: SHIPPED | OUTPATIENT
Start: 2019-03-04 | End: 2020-03-23 | Stop reason: SDUPTHER

## 2019-03-06 DIAGNOSIS — R13.19 ESOPHAGEAL DYSPHAGIA: ICD-10-CM

## 2019-03-06 RX ORDER — OMEPRAZOLE 20 MG/1
20 CAPSULE, DELAYED RELEASE ORAL DAILY
Qty: 30 CAPSULE | Refills: 0 | Status: SHIPPED | OUTPATIENT
Start: 2019-03-06 | End: 2019-04-04 | Stop reason: SDUPTHER

## 2019-03-09 DIAGNOSIS — J44.9 COPD MIXED TYPE (HCC): ICD-10-CM

## 2019-03-26 DIAGNOSIS — E11.9 TYPE 2 DIABETES MELLITUS WITHOUT COMPLICATION, WITHOUT LONG-TERM CURRENT USE OF INSULIN (HCC): Primary | ICD-10-CM

## 2019-03-26 RX ORDER — BLOOD-GLUCOSE METER
KIT MISCELLANEOUS DAILY
Qty: 1 EACH | Refills: 0 | Status: SHIPPED | OUTPATIENT
Start: 2019-03-26 | End: 2021-12-01

## 2019-03-26 NOTE — TELEPHONE ENCOUNTER
Pt glucose monitor is broken, would like a new order for one touch ultra mini, test strips, and [facility]ets [facility]

## 2019-04-04 DIAGNOSIS — R13.19 ESOPHAGEAL DYSPHAGIA: ICD-10-CM

## 2019-04-04 RX ORDER — OMEPRAZOLE 20 MG/1
20 CAPSULE, DELAYED RELEASE ORAL DAILY
Qty: 30 CAPSULE | Refills: 0 | Status: SHIPPED | OUTPATIENT
Start: 2019-04-04 | End: 2019-04-30 | Stop reason: SDUPTHER

## 2019-04-30 DIAGNOSIS — R13.19 ESOPHAGEAL DYSPHAGIA: ICD-10-CM

## 2019-04-30 RX ORDER — OMEPRAZOLE 20 MG/1
20 CAPSULE, DELAYED RELEASE ORAL DAILY
Qty: 30 CAPSULE | Refills: 0 | Status: SHIPPED | OUTPATIENT
Start: 2019-04-30 | End: 2020-04-02

## 2019-06-29 DIAGNOSIS — J44.9 COPD MIXED TYPE (HCC): ICD-10-CM

## 2019-07-12 LAB
LEFT EYE DIABETIC RETINOPATHY: NORMAL
RIGHT EYE DIABETIC RETINOPATHY: NORMAL

## 2019-08-06 ENCOUNTER — OFFICE VISIT (OUTPATIENT)
Dept: CARDIOLOGY CLINIC | Facility: CLINIC | Age: 64
End: 2019-08-06
Payer: MEDICARE

## 2019-08-06 VITALS
OXYGEN SATURATION: 97 % | BODY MASS INDEX: 26.16 KG/M2 | DIASTOLIC BLOOD PRESSURE: 80 MMHG | SYSTOLIC BLOOD PRESSURE: 130 MMHG | HEART RATE: 80 BPM | HEIGHT: 65 IN | WEIGHT: 157 LBS

## 2019-08-06 DIAGNOSIS — Q22.5 EBSTEIN ANOMALY: Primary | ICD-10-CM

## 2019-08-06 DIAGNOSIS — I51.89 DIASTOLIC DYSFUNCTION: ICD-10-CM

## 2019-08-06 DIAGNOSIS — I11.9 HYPERTENSIVE HEART DISEASE WITHOUT HEART FAILURE: ICD-10-CM

## 2019-08-06 DIAGNOSIS — I37.1 NONRHEUMATIC PULMONARY VALVE INSUFFICIENCY: ICD-10-CM

## 2019-08-06 DIAGNOSIS — E78.2 MIXED HYPERLIPIDEMIA: ICD-10-CM

## 2019-08-06 DIAGNOSIS — I10 ESSENTIAL HYPERTENSION: ICD-10-CM

## 2019-08-06 PROCEDURE — 99214 OFFICE O/P EST MOD 30 MIN: CPT | Performed by: INTERNAL MEDICINE

## 2019-08-24 ENCOUNTER — APPOINTMENT (OUTPATIENT)
Dept: LAB | Facility: CLINIC | Age: 64
End: 2019-08-24
Payer: MEDICARE

## 2019-08-24 DIAGNOSIS — E11.9 TYPE 2 DIABETES MELLITUS WITHOUT COMPLICATION, WITHOUT LONG-TERM CURRENT USE OF INSULIN (HCC): ICD-10-CM

## 2019-08-24 DIAGNOSIS — E78.2 MIXED HYPERLIPIDEMIA: ICD-10-CM

## 2019-08-24 LAB
ALBUMIN SERPL BCP-MCNC: 4.1 G/DL (ref 3.5–5)
ALP SERPL-CCNC: 85 U/L (ref 46–116)
ALT SERPL W P-5'-P-CCNC: 13 U/L (ref 12–78)
ANION GAP SERPL CALCULATED.3IONS-SCNC: 6 MMOL/L (ref 4–13)
AST SERPL W P-5'-P-CCNC: 17 U/L (ref 5–45)
BASOPHILS # BLD AUTO: 0.04 THOUSANDS/ΜL (ref 0–0.1)
BASOPHILS NFR BLD AUTO: 1 % (ref 0–1)
BILIRUB SERPL-MCNC: 0.57 MG/DL (ref 0.2–1)
BUN SERPL-MCNC: 12 MG/DL (ref 5–25)
CALCIUM SERPL-MCNC: 9.1 MG/DL (ref 8.3–10.1)
CHLORIDE SERPL-SCNC: 104 MMOL/L (ref 100–108)
CHOLEST SERPL-MCNC: 135 MG/DL (ref 50–200)
CO2 SERPL-SCNC: 30 MMOL/L (ref 21–32)
CREAT SERPL-MCNC: 0.82 MG/DL (ref 0.6–1.3)
CREAT UR-MCNC: 99.2 MG/DL
EOSINOPHIL # BLD AUTO: 0.27 THOUSAND/ΜL (ref 0–0.61)
EOSINOPHIL NFR BLD AUTO: 4 % (ref 0–6)
ERYTHROCYTE [DISTWIDTH] IN BLOOD BY AUTOMATED COUNT: 12.5 % (ref 11.6–15.1)
EST. AVERAGE GLUCOSE BLD GHB EST-MCNC: 120 MG/DL
GFR SERPL CREATININE-BSD FRML MDRD: 93 ML/MIN/1.73SQ M
GLUCOSE P FAST SERPL-MCNC: 91 MG/DL (ref 65–99)
HBA1C MFR BLD: 5.8 % (ref 4.2–6.3)
HCT VFR BLD AUTO: 46.3 % (ref 36.5–49.3)
HDLC SERPL-MCNC: 60 MG/DL (ref 40–60)
HGB BLD-MCNC: 14.5 G/DL (ref 12–17)
IMM GRANULOCYTES # BLD AUTO: 0.02 THOUSAND/UL (ref 0–0.2)
IMM GRANULOCYTES NFR BLD AUTO: 0 % (ref 0–2)
LDLC SERPL CALC-MCNC: 62 MG/DL (ref 0–100)
LYMPHOCYTES # BLD AUTO: 2.34 THOUSANDS/ΜL (ref 0.6–4.47)
LYMPHOCYTES NFR BLD AUTO: 33 % (ref 14–44)
MCH RBC QN AUTO: 27.2 PG (ref 26.8–34.3)
MCHC RBC AUTO-ENTMCNC: 31.3 G/DL (ref 31.4–37.4)
MCV RBC AUTO: 87 FL (ref 82–98)
MICROALBUMIN UR-MCNC: 5.1 MG/L (ref 0–20)
MICROALBUMIN/CREAT 24H UR: 5 MG/G CREATININE (ref 0–30)
MONOCYTES # BLD AUTO: 0.75 THOUSAND/ΜL (ref 0.17–1.22)
MONOCYTES NFR BLD AUTO: 10 % (ref 4–12)
NEUTROPHILS # BLD AUTO: 3.76 THOUSANDS/ΜL (ref 1.85–7.62)
NEUTS SEG NFR BLD AUTO: 52 % (ref 43–75)
NONHDLC SERPL-MCNC: 75 MG/DL
NRBC BLD AUTO-RTO: 0 /100 WBCS
PLATELET # BLD AUTO: 312 THOUSANDS/UL (ref 149–390)
PMV BLD AUTO: 10.8 FL (ref 8.9–12.7)
POTASSIUM SERPL-SCNC: 3.9 MMOL/L (ref 3.5–5.3)
PROT SERPL-MCNC: 7.4 G/DL (ref 6.4–8.2)
RBC # BLD AUTO: 5.33 MILLION/UL (ref 3.88–5.62)
SODIUM SERPL-SCNC: 140 MMOL/L (ref 136–145)
TRIGL SERPL-MCNC: 65 MG/DL
TSH SERPL DL<=0.05 MIU/L-ACNC: 0.89 UIU/ML (ref 0.36–3.74)
WBC # BLD AUTO: 7.18 THOUSAND/UL (ref 4.31–10.16)

## 2019-08-24 PROCEDURE — 85025 COMPLETE CBC W/AUTO DIFF WBC: CPT

## 2019-08-24 PROCEDURE — 80053 COMPREHEN METABOLIC PANEL: CPT

## 2019-08-24 PROCEDURE — 82043 UR ALBUMIN QUANTITATIVE: CPT

## 2019-08-24 PROCEDURE — 80061 LIPID PANEL: CPT

## 2019-08-24 PROCEDURE — 82570 ASSAY OF URINE CREATININE: CPT

## 2019-08-24 PROCEDURE — 83036 HEMOGLOBIN GLYCOSYLATED A1C: CPT

## 2019-08-24 PROCEDURE — 36415 COLL VENOUS BLD VENIPUNCTURE: CPT

## 2019-08-24 PROCEDURE — 84443 ASSAY THYROID STIM HORMONE: CPT

## 2019-08-28 DIAGNOSIS — J44.9 COPD MIXED TYPE (HCC): ICD-10-CM

## 2019-09-16 ENCOUNTER — OFFICE VISIT (OUTPATIENT)
Dept: INTERNAL MEDICINE CLINIC | Facility: CLINIC | Age: 64
End: 2019-09-16
Payer: MEDICARE

## 2019-09-16 VITALS
SYSTOLIC BLOOD PRESSURE: 116 MMHG | HEART RATE: 66 BPM | HEIGHT: 65 IN | WEIGHT: 152 LBS | BODY MASS INDEX: 25.33 KG/M2 | OXYGEN SATURATION: 96 % | DIASTOLIC BLOOD PRESSURE: 76 MMHG

## 2019-09-16 DIAGNOSIS — I10 ESSENTIAL HYPERTENSION: ICD-10-CM

## 2019-09-16 DIAGNOSIS — E78.2 MIXED HYPERLIPIDEMIA: ICD-10-CM

## 2019-09-16 DIAGNOSIS — E11.9 TYPE 2 DIABETES MELLITUS WITHOUT COMPLICATION, WITHOUT LONG-TERM CURRENT USE OF INSULIN (HCC): Primary | ICD-10-CM

## 2019-09-16 DIAGNOSIS — Z11.59 NEED FOR HEPATITIS C SCREENING TEST: ICD-10-CM

## 2019-09-16 DIAGNOSIS — F41.1 GENERALIZED ANXIETY DISORDER: ICD-10-CM

## 2019-09-16 DIAGNOSIS — Z23 NEED FOR INFLUENZA VACCINATION: ICD-10-CM

## 2019-09-16 DIAGNOSIS — E66.3 OVERWEIGHT: ICD-10-CM

## 2019-09-16 DIAGNOSIS — J44.9 CHRONIC OBSTRUCTIVE PULMONARY DISEASE, UNSPECIFIED COPD TYPE (HCC): ICD-10-CM

## 2019-09-16 DIAGNOSIS — F33.41 RECURRENT MAJOR DEPRESSIVE DISORDER, IN PARTIAL REMISSION (HCC): ICD-10-CM

## 2019-09-16 PROCEDURE — 99214 OFFICE O/P EST MOD 30 MIN: CPT | Performed by: INTERNAL MEDICINE

## 2019-09-16 PROCEDURE — 90662 IIV NO PRSV INCREASED AG IM: CPT | Performed by: INTERNAL MEDICINE

## 2019-09-16 PROCEDURE — G0008 ADMIN INFLUENZA VIRUS VAC: HCPCS | Performed by: INTERNAL MEDICINE

## 2019-09-16 RX ORDER — IBUPROFEN 600 MG/1
600 TABLET ORAL EVERY 6 HOURS PRN
Refills: 0 | COMMUNITY
Start: 2019-09-10

## 2019-09-16 NOTE — PROGRESS NOTES
INTERNAL MEDICINE OFFICE VISIT  [facility] AT [address], 0 [facility]  Tel: [phone]      NAME: Anusha Layne  AGE: 71 y o  SEX: male  : 1955   MRN: [id number]    DATE: 2019  TIME: 5:29 PM      Assessment and Plan:  1  Type 2 diabetes mellitus without complication, without long-term current use of insulin (HCC)     continue present medication, a diabetic foot exam was done today in the office       - CBC and differential; Future  - Comprehensive metabolic panel; Future  - TSH, 3rd generation; Future  - Hemoglobin A1C; Future  - Microalbumin / creatinine urine ratio; Future    2  Essential hypertension  Continue medications    3  Mixed hyperlipidemia   continue atorvastatin  - Lipid panel; Future    4  Chronic obstructive pulmonary disease, unspecified COPD type ([facility] [address] )   continue inhalers    5  Recurrent major depressive disorder, in partial remission ([facility] [address] )   continue Prozac    6  Generalized anxiety disorder   continue Restoril    7  Overweight  BMI Counseling: Body mass index is 25 29 kg/m²  The BMI is above normal  Nutrition recommendations include reducing portion sizes, decreasing overall calorie intake and moderation in carbohydrate intake  8  Need for hepatitis C screening test    - Hepatitis C antibody; Future      - Counseling Documentation: patient was counseled regarding: diagnostic results, instructions for management, risk factor reductions, prognosis, patient and family education, impressions, risks and benefits of treatment options and importance of compliance with treatment  - Medication Side Effects: Adverse side effects of medications were reviewed with the patient/guardian today  Return for follow up visit in  6 months or earlier, if needed        Chief Complaint:  Chief Complaint   Patient presents with    Follow-up     6 month         History of Present Illness:    type 2 diabetes -very well controlled with a hemoglobin A1c of 5 8  Hypertension -well controlled on medication  Hyperlipidemia -lipid profile is stable on atorvastatin  COPD -his breathing is very well controlled on the Advair and does not need to take albuterol inhaler  Depression and anxiety - he has been taking his medications and is stable  Overweight -he was told to try to lose weight        Active Problem List:  Patient Active Problem List   Diagnosis    Generalized anxiety disorder    Cardiac arrhythmia    Chronic obstructive pulmonary disease (HCC)    Recurrent major depressive disorder, in partial remission ([address] )    Diastolic dysfunction    Ebstein anomaly    GERD without esophagitis    Mixed hyperlipidemia    Essential hypertension    Hypertensive heart disease    Nonrheumatic pulmonary valve insufficiency    Overweight    Tricuspid valve disorders, non-rheumatic    Type 2 diabetes mellitus, without long-term current use of insulin ([facility])    Esophageal dysphagia         Past Medical History:  Past Medical History:   Diagnosis Date    Abnormal EKG     Alcohol abuse     Allergic rhinitis     last assessed: 6/4/2014    Anxiety     Asthma     last assesssed: 6/4/2014    Atresia of esophagus with tracheo-esophageal fistula     Benign neoplasm of colon     Campylobacter enteritis     last assessed: 5/6/2015    Cardiac arrhythmia     last assessed: 6/19/2015    Chronic cough     last assessed: 1/29/2015    Chronic fatigue syndrome     resolved: 6/4/2014    Chronic sinusitis     resolved: 6/4/2014    COPD (chronic obstructive pulmonary disease) ([address] )     Diabetes mellitus ([facility] [address] )     Digestive symptom     EKG, abnormal     Epistaxis     Generalized osteoarthritis     resolved: 6/4/2014    Heart disease     HTN (hypertension)     Hyperlipidemia     Myalgia     Myositis     Poisoning by drug or medicinal substance     Sleep apnea          Past Surgical History:  Past Surgical History:   Procedure Laterality Date    COLONOSCOPY      ESOPHAGOGASTRODUODENOSCOPY N/A 10/2/2018    Procedure: ESOPHAGOGASTRODUODENOSCOPY (EGD); Surgeon: Niharika Narayan MD;  [facility];  Service: Gastroenterology    FOOT SURGERY      [place] ESOPHAGOGASTRODUODENOSCOPY TRANSORAL DIAGNOSTIC N/A 2018    Procedure: ESOPHAGOGASTRODUODENOSCOPY (EGD); Surgeon: Eleanor Ocasio MD;  [facility]: [facility]; Service: Gastroenterology    TONSILLECTOMY           Family History:  Family History   Problem Relation Age of Onset    Asthma Mother          Social History:  Social History     Socioeconomic History    Marital status: Single     Spouse name: None    Number of children: None    Years of education: None    Highest education level: None   Occupational History    None   Social Needs    Financial resource strain: None    Food insecurity:     Worry: None     Inability: None    Transportation needs:     Medical: None     Non-medical: None   Tobacco Use    Smoking status: Former Smoker     Packs/day: 0 00     Last attempt to quit: 10/2/2006     Years since quittin 9    Smokeless tobacco: Never Used    Tobacco comment: cigars-social   Substance and Sexual Activity    Alcohol use: No     Comment: rarely    Drug use: No    Sexual activity: Yes     Partners: Female   Lifestyle    Physical activity:     Days per week: 3 days     Minutes per session: 40 min    Stress:  To some extent   Relationships    Social connections:     Talks on phone: None     Gets together: None     Attends [employer] service: None     Active member of club or organization: None     Attends meetings of clubs or organizations: None     Relationship status: None    Intimate partner violence:     Fear of current or ex partner: None     Emotionally abused: None     Physically abused: None     Forced sexual activity: None   Other Topics Concern    None   Social History Narrative    None         Allergies:  No Known Allergies      Medications:    Current Outpatient Medications:     ADVAIR DISKUS 250-50 MCG/DOSE inhaler, TAKE 1 PUFF BY MOUTH TWICE A DAY, Disp: 1 Inhaler, Rfl: 1    albuterol (VENTOLIN HFA) 90 mcg/act inhaler, Inhale 2 puffs every 4 (four) hours as needed for wheezing, Disp: 1 Inhaler, Rfl: 3    aspirin (ECOTRIN LOW STRENGTH) 81 mg EC tablet, Take 1 tablet by mouth daily, Disp: , Rfl:     atorvastatin (LIPITOR) 10 mg tablet, Take 1 tablet (10 mg total) by mouth daily, Disp: 90 tablet, Rfl: 3    busPIRone (BUSPAR) 10 mg tablet, Take 10 mg by mouth 2 (two) times a day, Disp: , Rfl: 3    diltiazem (TIAZAC) 180 MG 24 hr capsule, Take 1 capsule (180 mg total) by mouth daily, Disp: 90 capsule, Rfl: 3    FLUoxetine (PROZAC) 40 MG capsule, Take 1 capsule by mouth daily, Disp: , Rfl:     glucose blood (ONETOUCH VERIO) test strip, PT  TESTING ONCE DAILY DX E11 9, Disp: 100 each, Rfl: 3    lisinopril (ZESTRIL) 20 mg tablet, Take 1 tablet (20 mg total) by mouth daily, Disp: 90 tablet, Rfl: 3    metFORMIN (GLUCOPHAGE) 500 mg tablet, TAKE 1 TABLET BY MOUTH TWICE DAILY  , Disp: 180 tablet, Rfl: 3    ONETOUCH DELICA LANCETS FINE MISC, by Does not apply route, Disp: , Rfl:     temazepam (RESTORIL) 30 mg capsule, Take 1 capsule by mouth, Disp: , Rfl:     zolpidem (AMBIEN) 10 mg tablet, , Disp: , Rfl:     Blood Glucose Monitoring Suppl (ONE TOUCH ULTRA MINI) w/Device KIT, by Does not apply route daily for 30 days PT  TESTING ONCE DAILY DX:E11 9, Disp: 1 each, Rfl: 0    ibuprofen (MOTRIN) 600 mg tablet, Take 600 mg by mouth every 6 (six) hours as needed, Disp: , Rfl: 0    omeprazole (PriLOSEC) 20 mg delayed release capsule, TAKE 1 CAPSULE (20 MG TOTAL) BY MOUTH DAILY FOR 30 DAYS (Patient not taking: Reported on 8/6/2019), Disp: 30 capsule, Rfl: 0    ONE TOUCH LANCETS MISC, by Does not apply route daily for 30 days PT TESTING ONCE DAILY  DX:E11 9, Disp: 100 each, Rfl: 3      The following portions of the patient's history were reviewed and updated as appropriate: past medical history, past surgical history, family history, social history, allergies, current medications and active problem list       Review of Systems:  Constitutional: Denies fever, chills, weight gain, weight loss, fatigue  Eyes: Denies eye redness, eye discharge, double vision, change in visual acuity  ENT: Denies hearing loss, tinnitus, sneezing, nasal congestion, nasal discharge, sore throat   Respiratory: Denies cough, expectoration, hemoptysis, shortness of breath, wheezing  Cardiovascular: Denies chest pain, palpitations, lower extremity swelling, orthopnea, PND  Gastrointestinal: Denies abdominal pain, heartburn, nausea, vomiting, hematemesis, diarrhea, bloody stools  Genito-Urinary: Denies dysuria, frequency, difficulty in micturition, nocturia, incontinence  Musculoskeletal: Denies back pain, joint pain, muscle pain  Neurologic: Denies confusion, lightheadedness, syncope, headache, focal weakness, sensory changes, seizures  Endocrine: Denies polyuria, polydipsia, temperature intolerance  Allergy and Immunology: Denies hives, insect bite sensitivity  Hematological and Lymphatic: Denies bleeding problems, swollen glands   Psychological: Denies depression, suicidal ideation, anxiety, panic, mood swings  Dermatological: Denies pruritus, rash, skin lesion changes      Vitals:  Vitals:    09/16/19 1518   BP: 116/76   Pulse: 66   SpO2: 96%       Body mass index is 25 29 kg/m²  Weight (last 2 days)     Date/Time   Weight    09/16/19 1518   68 9 (152)                Physical Examination:  General: Patient is not in acute distress  Awake, alert, responding to commands  No weight gain or loss  Head: Normocephalic  Atraumatic  Eyes: Conjunctiva and lids with no swelling, erythema or discharge  Both pupils normal sized, round and reactive to light   Sclera nonicteric  ENT: External examination of nose and ear normal  Otoscopic examination shows translucent tympanic membranes with patent canals without erythema  Oropharynx moist with no erythema, edema, exudate or lesions  Neck: Supple  JVP not raised  Trachea midline  No masses  No thyromegaly  Lungs: No signs of increased work of breathing or respiratory distress  Bilateral bronchovascular breath sounds with no crackles or rhonchi  Chest wall: No tenderness  Cardiovascular: Normal PMI  No thrills  Regular rate and rhythm  S1 and S2 normal  No murmur, rub or gallop  Gastrointestinal: Abdomen soft, nontender  No guarding or rigidity  Liver and spleen not palpable  Bowel sounds present  Neurologic: Cranial nerves II-XII intact  Cortical functions normal  Motor system - Reflexes 2+ and symmetrical  Sensations normal  Musculoskeletal: Gait normal  No joint tenderness  Integumentary: Skin normal with no rash or lesions  Lymphatic: No palpable lymph nodes in neck, axilla or groin  Extremities: No clubbing, cyanosis, edema or varicosities  Psychological: Judgement and insight normal  Mood and affect normal  Patient's shoes and socks removed  Right Foot/Ankle   Right Foot Inspection  Skin Exam: skin normal and skin intact no dry skin, no warmth, no callus, no erythema, no maceration, no abnormal color, no pre-ulcer, no ulcer and no callus                          Toe Exam: ROM and strength within normal limits  Sensory     Proprioception: diminished and intact   Monofilament testing: intact  Vascular  Capillary refills: < 3 seconds  The right DP pulse is 2+  The right PT pulse is 2+  Left Foot/Ankle  Left Foot Inspection  Skin Exam: skin normal and skin intactno dry skin, no warmth, no erythema, no maceration, normal color, no pre-ulcer, no ulcer and no callus                         Toe Exam: ROM and strength within normal limits                   Sensory     Proprioception: intact  Monofilament: intact  Vascular  Capillary refills: < 3 seconds  The left DP pulse is 2+  The left PT pulse is 2+     Assign Risk Category:  No deformity present; No loss of protective sensation;  No weak pulses       Risk: 0            Laboratory Results:  CBC with diff:   Lab Results   Component Value Date    WBC 7 18 08/24/2019    WBC 8 33 10/19/2015    RBC 5 33 08/24/2019    RBC 5 46 10/19/2015    HGB 14 5 08/24/2019    HGB 14 8 10/19/2015    HCT 46 3 08/24/2019    HCT 44 9 10/19/2015    MCV 87 08/24/2019    MCV 82 10/19/2015    MCH 27 2 08/24/2019    MCH 27 1 10/19/2015    RDW 12 5 08/24/2019    RDW 13 0 10/19/2015     08/24/2019     10/19/2015       CMP:  Lab Results   Component Value Date    CREATININE 0 82 08/24/2019    CREATININE 0 85 10/19/2015    BUN 12 08/24/2019    BUN 10 10/19/2015     10/19/2015    K 3 9 08/24/2019    K 3 6 10/19/2015     08/24/2019     10/19/2015    CO2 30 08/24/2019    CO2 27 10/19/2015    GLUCOSE 142 (H) 10/19/2015    PROT 7 3 10/19/2015    ALKPHOS 85 08/24/2019    ALKPHOS 87 10/19/2015    ALT 13 08/24/2019    ALT 17 10/19/2015    AST 17 08/24/2019    AST 17 10/19/2015       Lab Results   Component Value Date    HGBA1C 5 8 08/24/2019    HGBA1C 6 7 (H) 10/19/2015       No results found for: TROPONINI, CKMB, CKTOTAL    Lipid Profile:   Lab Results   Component Value Date    CHOL 139 10/19/2015    CHOL 156 05/30/2015     Lab Results   Component Value Date    HDL 60 08/24/2019    HDL 77 (H) 02/20/2019     Lab Results   Component Value Date    LDLCALC 62 08/24/2019    LDLCALC 73 02/20/2019     Lab Results   Component Value Date    TRIG 65 08/24/2019    TRIG 63 02/20/2019       Imaging Results:       Health Maintenance:  Health Maintenance   Topic Date Due    Hepatitis C Screening  1955    BMI: Followup Plan  05/05/1972    HEPATITIS B VACCINES (1 of 3 - Risk 3-dose series) 05/05/1973    DTaP,Tdap,and Td Vaccines (1 - Tdap) 05/05/1975    Medicare Annual Wellness Visit (AWV)  04/10/2019    Diabetic Foot Exam  04/10/2019    Pneumococcal Vaccine: 65+ Years (1 of 2 - PCV13) 05/05/2019    INFLUENZA VACCINE  07/01/2019    HEMOGLOBIN A1C  02/24/2020    DM Eye Exam  02/25/2020    URINE MICROALBUMIN  08/24/2020    Fall Risk  09/16/2020    BMI: Adult  09/16/2020    CRC Screening: Colonoscopy  06/10/2025    Pneumococcal Vaccine: Pediatrics (0 to 5 Years) and At-Risk Patients (6 to 59 Years)  Aged [employer] History   Administered Date(s) Administered    INFLUENZA 10/28/2016, 10/24/2018    Influenza Quadrivalent, 6-35 Months IM 10/06/2014, 10/28/2016, 09/08/2017    Influenza TIV (IM) 10/21/2013, 10/06/2015    Influenza, high dose seasonal 0 5 mL 09/16/2019    Influenza, recombinant, quadrivalent,injectable, preservative free 10/24/2018    Pneumococcal Polysaccharide PPV23 06/04/2014, 02/27/2017    Tdap 1955    Zoster 01/01/2014         Yamile Vo MD  9/16/2019,5:29 PM

## 2019-09-20 DIAGNOSIS — J44.9 COPD MIXED TYPE (HCC): ICD-10-CM

## 2019-11-18 DIAGNOSIS — E11.9 TYPE 2 DIABETES MELLITUS WITHOUT COMPLICATION, WITHOUT LONG-TERM CURRENT USE OF INSULIN (HCC): ICD-10-CM

## 2019-11-23 DIAGNOSIS — E78.2 MIXED HYPERLIPIDEMIA: ICD-10-CM

## 2019-11-25 RX ORDER — ATORVASTATIN CALCIUM 10 MG/1
TABLET, FILM COATED ORAL
Qty: 90 TABLET | Refills: 3 | Status: SHIPPED | OUTPATIENT
Start: 2019-11-25 | End: 2020-12-21

## 2019-12-16 DIAGNOSIS — J44.9 COPD MIXED TYPE (HCC): ICD-10-CM

## 2020-01-04 DIAGNOSIS — I10 ESSENTIAL HYPERTENSION: ICD-10-CM

## 2020-01-06 RX ORDER — DILTIAZEM HYDROCHLORIDE 180 MG/1
CAPSULE, COATED, EXTENDED RELEASE ORAL
Qty: 90 CAPSULE | Refills: 0 | Status: SHIPPED | OUTPATIENT
Start: 2020-01-06 | End: 2020-03-29

## 2020-02-16 DIAGNOSIS — I10 ESSENTIAL HYPERTENSION: ICD-10-CM

## 2020-02-17 RX ORDER — LISINOPRIL 20 MG/1
TABLET ORAL
Qty: 90 TABLET | Refills: 3 | Status: SHIPPED | OUTPATIENT
Start: 2020-02-17 | End: 2021-03-02 | Stop reason: SDUPTHER

## 2020-03-16 ENCOUNTER — APPOINTMENT (OUTPATIENT)
Dept: LAB | Facility: CLINIC | Age: 65
End: 2020-03-16
Payer: MEDICARE

## 2020-03-16 DIAGNOSIS — Z11.59 NEED FOR HEPATITIS C SCREENING TEST: ICD-10-CM

## 2020-03-16 DIAGNOSIS — E78.2 MIXED HYPERLIPIDEMIA: ICD-10-CM

## 2020-03-16 DIAGNOSIS — E11.9 TYPE 2 DIABETES MELLITUS WITHOUT COMPLICATION, WITHOUT LONG-TERM CURRENT USE OF INSULIN (HCC): ICD-10-CM

## 2020-03-16 LAB
ALBUMIN SERPL BCP-MCNC: 3.7 G/DL (ref 3.5–5)
ALP SERPL-CCNC: 73 U/L (ref 46–116)
ALT SERPL W P-5'-P-CCNC: 11 U/L (ref 12–78)
ANION GAP SERPL CALCULATED.3IONS-SCNC: 4 MMOL/L (ref 4–13)
AST SERPL W P-5'-P-CCNC: 16 U/L (ref 5–45)
BASOPHILS # BLD AUTO: 0.07 THOUSANDS/ΜL (ref 0–0.1)
BASOPHILS NFR BLD AUTO: 1 % (ref 0–1)
BILIRUB SERPL-MCNC: 0.6 MG/DL (ref 0.2–1)
BUN SERPL-MCNC: 16 MG/DL (ref 5–25)
CALCIUM SERPL-MCNC: 9.2 MG/DL (ref 8.3–10.1)
CHLORIDE SERPL-SCNC: 102 MMOL/L (ref 100–108)
CHOLEST SERPL-MCNC: 165 MG/DL (ref 50–200)
CO2 SERPL-SCNC: 29 MMOL/L (ref 21–32)
CREAT SERPL-MCNC: 0.87 MG/DL (ref 0.6–1.3)
CREAT UR-MCNC: 87.4 MG/DL
EOSINOPHIL # BLD AUTO: 0.25 THOUSAND/ΜL (ref 0–0.61)
EOSINOPHIL NFR BLD AUTO: 3 % (ref 0–6)
ERYTHROCYTE [DISTWIDTH] IN BLOOD BY AUTOMATED COUNT: 12.9 % (ref 11.6–15.1)
EST. AVERAGE GLUCOSE BLD GHB EST-MCNC: 120 MG/DL
GFR SERPL CREATININE-BSD FRML MDRD: 91 ML/MIN/1.73SQ M
GLUCOSE P FAST SERPL-MCNC: 100 MG/DL (ref 65–99)
HBA1C MFR BLD: 5.8 %
HCT VFR BLD AUTO: 44.6 % (ref 36.5–49.3)
HCV AB SER QL: NORMAL
HDLC SERPL-MCNC: 73 MG/DL
HGB BLD-MCNC: 13.8 G/DL (ref 12–17)
IMM GRANULOCYTES # BLD AUTO: 0.03 THOUSAND/UL (ref 0–0.2)
IMM GRANULOCYTES NFR BLD AUTO: 0 % (ref 0–2)
LDLC SERPL CALC-MCNC: 63 MG/DL (ref 0–100)
LYMPHOCYTES # BLD AUTO: 3.25 THOUSANDS/ΜL (ref 0.6–4.47)
LYMPHOCYTES NFR BLD AUTO: 38 % (ref 14–44)
MCH RBC QN AUTO: 27 PG (ref 26.8–34.3)
MCHC RBC AUTO-ENTMCNC: 30.9 G/DL (ref 31.4–37.4)
MCV RBC AUTO: 87 FL (ref 82–98)
MICROALBUMIN UR-MCNC: 7.9 MG/L (ref 0–20)
MICROALBUMIN/CREAT 24H UR: 9 MG/G CREATININE (ref 0–30)
MONOCYTES # BLD AUTO: 0.94 THOUSAND/ΜL (ref 0.17–1.22)
MONOCYTES NFR BLD AUTO: 11 % (ref 4–12)
NEUTROPHILS # BLD AUTO: 4.07 THOUSANDS/ΜL (ref 1.85–7.62)
NEUTS SEG NFR BLD AUTO: 47 % (ref 43–75)
NONHDLC SERPL-MCNC: 92 MG/DL
NRBC BLD AUTO-RTO: 0 /100 WBCS
PLATELET # BLD AUTO: 291 THOUSANDS/UL (ref 149–390)
PMV BLD AUTO: 10.7 FL (ref 8.9–12.7)
POTASSIUM SERPL-SCNC: 3.7 MMOL/L (ref 3.5–5.3)
PROT SERPL-MCNC: 7.1 G/DL (ref 6.4–8.2)
RBC # BLD AUTO: 5.11 MILLION/UL (ref 3.88–5.62)
SODIUM SERPL-SCNC: 135 MMOL/L (ref 136–145)
TRIGL SERPL-MCNC: 144 MG/DL
TSH SERPL DL<=0.05 MIU/L-ACNC: 1.91 UIU/ML (ref 0.36–3.74)
WBC # BLD AUTO: 8.61 THOUSAND/UL (ref 4.31–10.16)

## 2020-03-16 PROCEDURE — 83036 HEMOGLOBIN GLYCOSYLATED A1C: CPT

## 2020-03-16 PROCEDURE — 36415 COLL VENOUS BLD VENIPUNCTURE: CPT

## 2020-03-16 PROCEDURE — 82570 ASSAY OF URINE CREATININE: CPT

## 2020-03-16 PROCEDURE — 84443 ASSAY THYROID STIM HORMONE: CPT

## 2020-03-16 PROCEDURE — 86803 HEPATITIS C AB TEST: CPT

## 2020-03-16 PROCEDURE — 82043 UR ALBUMIN QUANTITATIVE: CPT

## 2020-03-16 PROCEDURE — 80053 COMPREHEN METABOLIC PANEL: CPT

## 2020-03-16 PROCEDURE — 80061 LIPID PANEL: CPT

## 2020-03-16 PROCEDURE — 85025 COMPLETE CBC W/AUTO DIFF WBC: CPT

## 2020-03-23 ENCOUNTER — TELEMEDICINE (OUTPATIENT)
Dept: INTERNAL MEDICINE CLINIC | Facility: CLINIC | Age: 65
End: 2020-03-23
Payer: MEDICARE

## 2020-03-23 DIAGNOSIS — J44.9 CHRONIC OBSTRUCTIVE PULMONARY DISEASE, UNSPECIFIED COPD TYPE (HCC): ICD-10-CM

## 2020-03-23 DIAGNOSIS — J44.0 CHRONIC OBSTRUCTIVE PULMONARY DISEASE WITH ACUTE LOWER RESPIRATORY INFECTION (HCC): Primary | ICD-10-CM

## 2020-03-23 PROCEDURE — G2012 BRIEF CHECK IN BY MD/QHP: HCPCS | Performed by: INTERNAL MEDICINE

## 2020-03-23 RX ORDER — AZITHROMYCIN 250 MG/1
TABLET, FILM COATED ORAL
Qty: 6 TABLET | Refills: 0 | Status: SHIPPED | OUTPATIENT
Start: 2020-03-23 | End: 2020-03-28

## 2020-03-23 RX ORDER — BENZONATATE 100 MG/1
100 CAPSULE ORAL 3 TIMES DAILY PRN
Qty: 20 CAPSULE | Refills: 0 | Status: SHIPPED | OUTPATIENT
Start: 2020-03-23 | End: 2020-04-02

## 2020-03-23 RX ORDER — ALBUTEROL SULFATE 90 UG/1
2 AEROSOL, METERED RESPIRATORY (INHALATION) EVERY 4 HOURS PRN
Qty: 1 INHALER | Refills: 3 | Status: SHIPPED | OUTPATIENT
Start: 2020-03-23 | End: 2020-11-20 | Stop reason: SDUPTHER

## 2020-03-23 NOTE — PROGRESS NOTES
Virtual Brief Visit    Reason for visit is  Cough for 2 days      Encounter provider Argelia Rodrigez MD    Provider located at [address] [place] [address]      Recent Visits  No visits were found meeting these conditions  Showing recent visits within past 7 days and meeting all other requirements     Future Appointments  No visits were found meeting these conditions  Showing future appointments within next 150 days and meeting all other requirements        Patient agrees to participate in a virtual check in via telephone or video visit instead of presenting to the office to address urgent/immediate medical needs  Patient is aware this is a billable service  After connecting through telephone, the patient was identified by name and date of birth  Haley Valderrama was informed that this was a telemedicine visit and that the visit is being conducted through telephone which may not be secure and therefore might not be HIPAA-compliant  My office door was closed  No one else was in the room  He acknowledged consent and understanding of privacy and security of the virtual check-in visit  I informed the patient that I have reviewed his record in Epic and presented the opportunity for him to ask any questions regarding the visit today  The patient initiated communication and agreed to participate  Subjective  Haley Valderrama is a 71 y o  male  Who complains of a cough the last 2 days  He has been raking leaves in his backyard for the last 2 days and noticed that he was coughing  He also has a phlegm which is sometimes yellowish  He denies any shortness of breath or fever  He has a history of COPD and has been taking the Advair regularly    He also takes the albuterol inhaler as needed      Past Medical History:   Diagnosis Date    Abnormal EKG     Alcohol abuse     Allergic rhinitis     last assessed: 6/4/2014    Anxiety     Asthma last assesssed: 6/4/2014    Atresia of esophagus with tracheo-esophageal fistula     Benign neoplasm of colon     Campylobacter enteritis     last assessed: 5/6/2015    Cardiac arrhythmia     last assessed: 6/19/2015    Chronic cough     last assessed: 1/29/2015    Chronic fatigue syndrome     resolved: 6/4/2014    Chronic sinusitis     resolved: 6/4/2014    COPD (chronic obstructive pulmonary disease) ([facility] [address] )     Diabetes mellitus ([facility] [address] )     Digestive symptom     EKG, abnormal     Epistaxis     Generalized osteoarthritis     resolved: 6/4/2014    Heart disease     HTN (hypertension)     Hyperlipidemia     Myalgia     Myositis     Poisoning by drug or medicinal substance     Sleep apnea        Past Surgical History:   Procedure Laterality Date    COLONOSCOPY      ESOPHAGOGASTRODUODENOSCOPY N/A 10/2/2018    Procedure: ESOPHAGOGASTRODUODENOSCOPY (EGD); Surgeon: Amelia Hall MD;  [facility];  Service: Gastroenterology    FOOT SURGERY      CT ESOPHAGOGASTRODUODENOSCOPY TRANSORAL DIAGNOSTIC N/A 11/8/2018    Procedure: ESOPHAGOGASTRODUODENOSCOPY (EGD); Surgeon: Erik Smith MD;  [facility]: [facility];   Service: Gastroenterology    TONSILLECTOMY         Current Outpatient Medications   Medication Sig Dispense Refill    ADVAIR DISKUS 250-50 MCG/DOSE inhaler TAKE 1 PUFF BY MOUTH TWICE A DAY 60 each 5    albuterol (VENTOLIN HFA) 90 mcg/act inhaler Inhale 2 puffs every 4 (four) hours as needed for wheezing 1 Inhaler 3    aspirin (ECOTRIN LOW STRENGTH) 81 mg EC tablet Take 1 tablet by mouth daily      atorvastatin (LIPITOR) 10 mg tablet TAKE 1 TABLET BY MOUTH EVERY DAY 90 tablet 3    Blood Glucose Monitoring Suppl (ONE TOUCH ULTRA MINI) w/Device KIT by Does not apply route daily for 30 days PT  TESTING ONCE DAILY DX:E11 9 1 each 0    busPIRone (BUSPAR) 10 mg tablet Take 10 mg by mouth 2 (two) times a day  3    diltiazem (CARDIZEM CD) 180 mg 24 hr capsule TAKE 1 CAPSULE BY MOUTH EVERY DAY 90 capsule 0    FLUoxetine (PROZAC) 40 MG capsule Take 1 capsule by mouth daily      glucose blood (ONETOUCH VERIO) test strip PT  TESTING ONCE DAILY DX E11 9 100 each 3    ibuprofen (MOTRIN) 600 mg tablet Take 600 mg by mouth every 6 (six) hours as needed  0    lisinopril (ZESTRIL) 20 mg tablet TAKE 1 TABLET BY MOUTH EVERY DAY 90 tablet 3    metFORMIN (GLUCOPHAGE) 500 mg tablet TAKE 1 TABLET BY MOUTH TWICE DAILY  180 tablet 3    omeprazole (PriLOSEC) 20 mg delayed release capsule TAKE 1 CAPSULE (20 MG TOTAL) BY MOUTH DAILY FOR 30 DAYS (Patient not taking: Reported on 8/6/2019) 30 capsule 0    ONE TOUCH LANCETS MISC by Does not apply route daily for 30 days PT TESTING ONCE DAILY  DX:E11 9 100 each 3    ONETOUCH DELICA LANCETS FINE MISC by Does not apply route      temazepam (RESTORIL) 30 mg capsule Take 1 capsule by mouth      zolpidem (AMBIEN) 10 mg tablet        No current facility-administered medications for this visit  No Known Allergies    Assessment    Ken telephone assessment is  done, patient does not appear to be in distress  Karli Galloway He was advised to continue taking the Advair twice a day regularly  He was also told to take the albuterol inhaler as needed  Tessalon Perles and azithromycin were sent to the pharmacy  He was advised to call me back in 2 days if the cough worsens or if he gets a fever  I offered him to go for COVID testing but he wanted to wait a couple of days and take the medication to see if it would make him better  Disposition:      He was told to  Call me as needed if the symptoms get worse  I spent   Ten minutes with the patient during this virtual check-in visit

## 2020-03-29 DIAGNOSIS — I10 ESSENTIAL HYPERTENSION: ICD-10-CM

## 2020-03-29 RX ORDER — DILTIAZEM HYDROCHLORIDE 180 MG/1
CAPSULE, COATED, EXTENDED RELEASE ORAL
Qty: 90 CAPSULE | Refills: 0 | Status: SHIPPED | OUTPATIENT
Start: 2020-03-29 | End: 2020-04-09

## 2020-04-02 ENCOUNTER — TELEMEDICINE (OUTPATIENT)
Dept: CARDIOLOGY CLINIC | Facility: CLINIC | Age: 65
End: 2020-04-02
Payer: MEDICARE

## 2020-04-02 VITALS — WEIGHT: 145 LBS | BODY MASS INDEX: 24.13 KG/M2

## 2020-04-02 DIAGNOSIS — E78.2 MIXED HYPERLIPIDEMIA: ICD-10-CM

## 2020-04-02 DIAGNOSIS — I51.89 DIASTOLIC DYSFUNCTION: ICD-10-CM

## 2020-04-02 DIAGNOSIS — I37.1 NONRHEUMATIC PULMONARY VALVE INSUFFICIENCY: ICD-10-CM

## 2020-04-02 DIAGNOSIS — I10 ESSENTIAL HYPERTENSION: ICD-10-CM

## 2020-04-02 DIAGNOSIS — I11.9 HYPERTENSIVE HEART DISEASE WITHOUT HEART FAILURE: ICD-10-CM

## 2020-04-02 DIAGNOSIS — Q22.5 EBSTEIN ANOMALY: Primary | ICD-10-CM

## 2020-04-02 PROCEDURE — 99213 OFFICE O/P EST LOW 20 MIN: CPT | Performed by: INTERNAL MEDICINE

## 2020-04-09 DIAGNOSIS — I10 ESSENTIAL HYPERTENSION: ICD-10-CM

## 2020-04-09 RX ORDER — DILTIAZEM HYDROCHLORIDE 180 MG/1
CAPSULE, COATED, EXTENDED RELEASE ORAL
Qty: 90 CAPSULE | Refills: 0 | Status: SHIPPED | OUTPATIENT
Start: 2020-04-09 | End: 2020-09-17

## 2020-06-06 DIAGNOSIS — J44.9 COPD MIXED TYPE (HCC): ICD-10-CM

## 2020-09-17 DIAGNOSIS — I10 ESSENTIAL HYPERTENSION: ICD-10-CM

## 2020-09-17 RX ORDER — DILTIAZEM HYDROCHLORIDE 180 MG/1
CAPSULE, COATED, EXTENDED RELEASE ORAL
Qty: 90 CAPSULE | Refills: 0 | Status: SHIPPED | OUTPATIENT
Start: 2020-09-17 | End: 2021-01-18 | Stop reason: SDUPTHER

## 2020-10-01 DIAGNOSIS — E11.9 TYPE 2 DIABETES MELLITUS WITHOUT COMPLICATION, WITHOUT LONG-TERM CURRENT USE OF INSULIN (HCC): ICD-10-CM

## 2020-10-02 RX ORDER — BLOOD SUGAR DIAGNOSTIC
STRIP MISCELLANEOUS
Qty: 100 EACH | Refills: 3 | Status: SHIPPED | OUTPATIENT
Start: 2020-10-02

## 2020-11-20 ENCOUNTER — LAB (OUTPATIENT)
Dept: LAB | Facility: CLINIC | Age: 65
End: 2020-11-20
Payer: MEDICARE

## 2020-11-20 ENCOUNTER — OFFICE VISIT (OUTPATIENT)
Dept: INTERNAL MEDICINE CLINIC | Facility: CLINIC | Age: 65
End: 2020-11-20
Payer: MEDICARE

## 2020-11-20 VITALS
DIASTOLIC BLOOD PRESSURE: 78 MMHG | BODY MASS INDEX: 26.46 KG/M2 | SYSTOLIC BLOOD PRESSURE: 110 MMHG | HEART RATE: 80 BPM | HEIGHT: 65 IN | WEIGHT: 158.8 LBS | TEMPERATURE: 97.2 F | OXYGEN SATURATION: 96 %

## 2020-11-20 DIAGNOSIS — Z00.00 MEDICARE ANNUAL WELLNESS VISIT, SUBSEQUENT: ICD-10-CM

## 2020-11-20 DIAGNOSIS — E66.3 OVERWEIGHT: ICD-10-CM

## 2020-11-20 DIAGNOSIS — E11.9 TYPE 2 DIABETES MELLITUS WITHOUT COMPLICATION, WITHOUT LONG-TERM CURRENT USE OF INSULIN (HCC): ICD-10-CM

## 2020-11-20 DIAGNOSIS — E11.9 TYPE 2 DIABETES MELLITUS WITHOUT COMPLICATION, WITHOUT LONG-TERM CURRENT USE OF INSULIN (HCC): Primary | ICD-10-CM

## 2020-11-20 DIAGNOSIS — F33.41 RECURRENT MAJOR DEPRESSIVE DISORDER, IN PARTIAL REMISSION (HCC): ICD-10-CM

## 2020-11-20 DIAGNOSIS — J44.0 CHRONIC OBSTRUCTIVE PULMONARY DISEASE WITH ACUTE LOWER RESPIRATORY INFECTION (HCC): ICD-10-CM

## 2020-11-20 DIAGNOSIS — I10 ESSENTIAL HYPERTENSION: ICD-10-CM

## 2020-11-20 DIAGNOSIS — F41.1 GENERALIZED ANXIETY DISORDER: ICD-10-CM

## 2020-11-20 DIAGNOSIS — E78.2 MIXED HYPERLIPIDEMIA: ICD-10-CM

## 2020-11-20 DIAGNOSIS — J44.9 CHRONIC OBSTRUCTIVE PULMONARY DISEASE, UNSPECIFIED COPD TYPE (HCC): ICD-10-CM

## 2020-11-20 LAB
ALBUMIN SERPL BCP-MCNC: 3.8 G/DL (ref 3.5–5)
ALP SERPL-CCNC: 93 U/L (ref 46–116)
ALT SERPL W P-5'-P-CCNC: 16 U/L (ref 12–78)
ANION GAP SERPL CALCULATED.3IONS-SCNC: 2 MMOL/L (ref 4–13)
AST SERPL W P-5'-P-CCNC: 17 U/L (ref 5–45)
BASOPHILS # BLD AUTO: 0.05 THOUSANDS/ΜL (ref 0–0.1)
BASOPHILS NFR BLD AUTO: 1 % (ref 0–1)
BILIRUB SERPL-MCNC: 0.48 MG/DL (ref 0.2–1)
BUN SERPL-MCNC: 14 MG/DL (ref 5–25)
CALCIUM SERPL-MCNC: 9.6 MG/DL (ref 8.3–10.1)
CHLORIDE SERPL-SCNC: 107 MMOL/L (ref 100–108)
CHOLEST SERPL-MCNC: 161 MG/DL (ref 50–200)
CO2 SERPL-SCNC: 28 MMOL/L (ref 21–32)
CREAT SERPL-MCNC: 1.06 MG/DL (ref 0.6–1.3)
EOSINOPHIL # BLD AUTO: 0.26 THOUSAND/ΜL (ref 0–0.61)
EOSINOPHIL NFR BLD AUTO: 4 % (ref 0–6)
ERYTHROCYTE [DISTWIDTH] IN BLOOD BY AUTOMATED COUNT: 12.3 % (ref 11.6–15.1)
EST. AVERAGE GLUCOSE BLD GHB EST-MCNC: 131 MG/DL
GFR SERPL CREATININE-BSD FRML MDRD: 73 ML/MIN/1.73SQ M
GLUCOSE SERPL-MCNC: 177 MG/DL (ref 65–140)
HBA1C MFR BLD: 6.2 %
HCT VFR BLD AUTO: 48.6 % (ref 36.5–49.3)
HDLC SERPL-MCNC: 71 MG/DL
HGB BLD-MCNC: 15.4 G/DL (ref 12–17)
IMM GRANULOCYTES # BLD AUTO: 0.02 THOUSAND/UL (ref 0–0.2)
IMM GRANULOCYTES NFR BLD AUTO: 0 % (ref 0–2)
LDLC SERPL CALC-MCNC: 44 MG/DL (ref 0–100)
LYMPHOCYTES # BLD AUTO: 2.45 THOUSANDS/ΜL (ref 0.6–4.47)
LYMPHOCYTES NFR BLD AUTO: 34 % (ref 14–44)
MCH RBC QN AUTO: 26.9 PG (ref 26.8–34.3)
MCHC RBC AUTO-ENTMCNC: 31.7 G/DL (ref 31.4–37.4)
MCV RBC AUTO: 85 FL (ref 82–98)
MONOCYTES # BLD AUTO: 0.64 THOUSAND/ΜL (ref 0.17–1.22)
MONOCYTES NFR BLD AUTO: 9 % (ref 4–12)
NEUTROPHILS # BLD AUTO: 3.86 THOUSANDS/ΜL (ref 1.85–7.62)
NEUTS SEG NFR BLD AUTO: 52 % (ref 43–75)
NONHDLC SERPL-MCNC: 90 MG/DL
NRBC BLD AUTO-RTO: 0 /100 WBCS
PLATELET # BLD AUTO: 283 THOUSANDS/UL (ref 149–390)
PMV BLD AUTO: 10.2 FL (ref 8.9–12.7)
POTASSIUM SERPL-SCNC: 4 MMOL/L (ref 3.5–5.3)
PROT SERPL-MCNC: 7.6 G/DL (ref 6.4–8.2)
RBC # BLD AUTO: 5.72 MILLION/UL (ref 3.88–5.62)
SODIUM SERPL-SCNC: 137 MMOL/L (ref 136–145)
TRIGL SERPL-MCNC: 231 MG/DL
TSH SERPL DL<=0.05 MIU/L-ACNC: 1.3 UIU/ML (ref 0.36–3.74)
WBC # BLD AUTO: 7.28 THOUSAND/UL (ref 4.31–10.16)

## 2020-11-20 PROCEDURE — 99214 OFFICE O/P EST MOD 30 MIN: CPT | Performed by: INTERNAL MEDICINE

## 2020-11-20 PROCEDURE — G0439 PPPS, SUBSEQ VISIT: HCPCS | Performed by: INTERNAL MEDICINE

## 2020-11-20 PROCEDURE — 36415 COLL VENOUS BLD VENIPUNCTURE: CPT

## 2020-11-20 PROCEDURE — 80061 LIPID PANEL: CPT

## 2020-11-20 PROCEDURE — 84443 ASSAY THYROID STIM HORMONE: CPT

## 2020-11-20 PROCEDURE — 83036 HEMOGLOBIN GLYCOSYLATED A1C: CPT

## 2020-11-20 PROCEDURE — 85025 COMPLETE CBC W/AUTO DIFF WBC: CPT

## 2020-11-20 PROCEDURE — 80053 COMPREHEN METABOLIC PANEL: CPT

## 2020-11-20 RX ORDER — ALBUTEROL SULFATE 90 UG/1
2 AEROSOL, METERED RESPIRATORY (INHALATION) EVERY 4 HOURS PRN
Qty: 1 INHALER | Refills: 3 | Status: SHIPPED | OUTPATIENT
Start: 2020-11-20 | End: 2022-07-01 | Stop reason: SDUPTHER

## 2020-11-23 ENCOUNTER — TELEPHONE (OUTPATIENT)
Dept: INTERNAL MEDICINE CLINIC | Facility: CLINIC | Age: 65
End: 2020-11-23

## 2020-12-16 DIAGNOSIS — J44.9 COPD MIXED TYPE (HCC): ICD-10-CM

## 2020-12-21 DIAGNOSIS — E78.2 MIXED HYPERLIPIDEMIA: ICD-10-CM

## 2020-12-21 DIAGNOSIS — E11.9 TYPE 2 DIABETES MELLITUS WITHOUT COMPLICATION, WITHOUT LONG-TERM CURRENT USE OF INSULIN (HCC): ICD-10-CM

## 2020-12-21 RX ORDER — ATORVASTATIN CALCIUM 10 MG/1
TABLET, FILM COATED ORAL
Qty: 90 TABLET | Refills: 3 | Status: SHIPPED | OUTPATIENT
Start: 2020-12-21 | End: 2021-12-06

## 2021-01-18 ENCOUNTER — TELEPHONE (OUTPATIENT)
Dept: INTERNAL MEDICINE CLINIC | Facility: CLINIC | Age: 66
End: 2021-01-18

## 2021-01-18 DIAGNOSIS — I10 ESSENTIAL HYPERTENSION: ICD-10-CM

## 2021-01-18 RX ORDER — DILTIAZEM HYDROCHLORIDE 180 MG/1
180 CAPSULE, COATED, EXTENDED RELEASE ORAL DAILY
Qty: 90 CAPSULE | Refills: 0 | Status: SHIPPED | OUTPATIENT
Start: 2021-01-18 | End: 2021-03-01

## 2021-03-01 DIAGNOSIS — I10 ESSENTIAL HYPERTENSION: ICD-10-CM

## 2021-03-01 RX ORDER — DILTIAZEM HYDROCHLORIDE 180 MG/1
CAPSULE, COATED, EXTENDED RELEASE ORAL
Qty: 90 CAPSULE | Refills: 0 | Status: SHIPPED | OUTPATIENT
Start: 2021-03-01 | End: 2021-06-21

## 2021-03-02 DIAGNOSIS — I10 ESSENTIAL HYPERTENSION: ICD-10-CM

## 2021-03-02 RX ORDER — LISINOPRIL 20 MG/1
20 TABLET ORAL DAILY
Qty: 90 TABLET | Refills: 3 | Status: SHIPPED | OUTPATIENT
Start: 2021-03-02 | End: 2022-01-05

## 2021-05-21 ENCOUNTER — TELEPHONE (OUTPATIENT)
Dept: INTERNAL MEDICINE CLINIC | Facility: CLINIC | Age: 66
End: 2021-05-21

## 2021-05-21 ENCOUNTER — LAB (OUTPATIENT)
Dept: LAB | Facility: CLINIC | Age: 66
End: 2021-05-21
Payer: MEDICARE

## 2021-05-21 ENCOUNTER — OFFICE VISIT (OUTPATIENT)
Dept: INTERNAL MEDICINE CLINIC | Facility: CLINIC | Age: 66
End: 2021-05-21
Payer: MEDICARE

## 2021-05-21 VITALS
HEIGHT: 65 IN | TEMPERATURE: 98.2 F | WEIGHT: 158.2 LBS | OXYGEN SATURATION: 95 % | DIASTOLIC BLOOD PRESSURE: 88 MMHG | SYSTOLIC BLOOD PRESSURE: 136 MMHG | BODY MASS INDEX: 26.36 KG/M2 | HEART RATE: 72 BPM

## 2021-05-21 DIAGNOSIS — I10 ESSENTIAL HYPERTENSION: ICD-10-CM

## 2021-05-21 DIAGNOSIS — E78.2 MIXED HYPERLIPIDEMIA: ICD-10-CM

## 2021-05-21 DIAGNOSIS — E11.9 TYPE 2 DIABETES MELLITUS WITHOUT COMPLICATION, WITHOUT LONG-TERM CURRENT USE OF INSULIN (HCC): ICD-10-CM

## 2021-05-21 DIAGNOSIS — E66.3 OVERWEIGHT: ICD-10-CM

## 2021-05-21 DIAGNOSIS — F41.1 GENERALIZED ANXIETY DISORDER: ICD-10-CM

## 2021-05-21 DIAGNOSIS — E11.9 TYPE 2 DIABETES MELLITUS WITHOUT COMPLICATION, WITHOUT LONG-TERM CURRENT USE OF INSULIN (HCC): Primary | ICD-10-CM

## 2021-05-21 DIAGNOSIS — J44.9 CHRONIC OBSTRUCTIVE PULMONARY DISEASE, UNSPECIFIED COPD TYPE (HCC): ICD-10-CM

## 2021-05-21 DIAGNOSIS — F33.41 RECURRENT MAJOR DEPRESSIVE DISORDER, IN PARTIAL REMISSION (HCC): ICD-10-CM

## 2021-05-21 LAB
ALBUMIN SERPL BCP-MCNC: 3.8 G/DL (ref 3.5–5)
ALP SERPL-CCNC: 86 U/L (ref 46–116)
ALT SERPL W P-5'-P-CCNC: 15 U/L (ref 12–78)
ANION GAP SERPL CALCULATED.3IONS-SCNC: 7 MMOL/L (ref 4–13)
AST SERPL W P-5'-P-CCNC: 17 U/L (ref 5–45)
BASOPHILS # BLD AUTO: 0.05 THOUSANDS/ΜL (ref 0–0.1)
BASOPHILS NFR BLD AUTO: 1 % (ref 0–1)
BILIRUB SERPL-MCNC: 0.64 MG/DL (ref 0.2–1)
BUN SERPL-MCNC: 12 MG/DL (ref 5–25)
CALCIUM SERPL-MCNC: 9.3 MG/DL (ref 8.3–10.1)
CHLORIDE SERPL-SCNC: 106 MMOL/L (ref 100–108)
CHOLEST SERPL-MCNC: 177 MG/DL (ref 50–200)
CO2 SERPL-SCNC: 26 MMOL/L (ref 21–32)
CREAT SERPL-MCNC: 0.83 MG/DL (ref 0.6–1.3)
CREAT UR-MCNC: 99.1 MG/DL
EOSINOPHIL # BLD AUTO: 0.32 THOUSAND/ΜL (ref 0–0.61)
EOSINOPHIL NFR BLD AUTO: 4 % (ref 0–6)
ERYTHROCYTE [DISTWIDTH] IN BLOOD BY AUTOMATED COUNT: 12.4 % (ref 11.6–15.1)
EST. AVERAGE GLUCOSE BLD GHB EST-MCNC: 126 MG/DL
GFR SERPL CREATININE-BSD FRML MDRD: 91 ML/MIN/1.73SQ M
GLUCOSE SERPL-MCNC: 103 MG/DL (ref 65–140)
HBA1C MFR BLD: 6 %
HCT VFR BLD AUTO: 46.6 % (ref 36.5–49.3)
HDLC SERPL-MCNC: 78 MG/DL
HGB BLD-MCNC: 15 G/DL (ref 12–17)
IMM GRANULOCYTES # BLD AUTO: 0.02 THOUSAND/UL (ref 0–0.2)
IMM GRANULOCYTES NFR BLD AUTO: 0 % (ref 0–2)
LDLC SERPL CALC-MCNC: 54 MG/DL (ref 0–100)
LEFT EYE DIABETIC RETINOPATHY: NORMAL
LEFT EYE IMAGE QUALITY: NORMAL
LEFT EYE MACULAR EDEMA: NORMAL
LEFT EYE OTHER RETINOPATHY: NORMAL
LYMPHOCYTES # BLD AUTO: 2.28 THOUSANDS/ΜL (ref 0.6–4.47)
LYMPHOCYTES NFR BLD AUTO: 31 % (ref 14–44)
MCH RBC QN AUTO: 27.8 PG (ref 26.8–34.3)
MCHC RBC AUTO-ENTMCNC: 32.2 G/DL (ref 31.4–37.4)
MCV RBC AUTO: 86 FL (ref 82–98)
MICROALBUMIN UR-MCNC: 7.9 MG/L (ref 0–20)
MICROALBUMIN/CREAT 24H UR: 8 MG/G CREATININE (ref 0–30)
MONOCYTES # BLD AUTO: 0.71 THOUSAND/ΜL (ref 0.17–1.22)
MONOCYTES NFR BLD AUTO: 10 % (ref 4–12)
NEUTROPHILS # BLD AUTO: 3.94 THOUSANDS/ΜL (ref 1.85–7.62)
NEUTS SEG NFR BLD AUTO: 54 % (ref 43–75)
NONHDLC SERPL-MCNC: 99 MG/DL
NRBC BLD AUTO-RTO: 0 /100 WBCS
PLATELET # BLD AUTO: 278 THOUSANDS/UL (ref 149–390)
PMV BLD AUTO: 10.9 FL (ref 8.9–12.7)
POTASSIUM SERPL-SCNC: 3.8 MMOL/L (ref 3.5–5.3)
PROT SERPL-MCNC: 7.6 G/DL (ref 6.4–8.2)
RBC # BLD AUTO: 5.4 MILLION/UL (ref 3.88–5.62)
RIGHT EYE DIABETIC RETINOPATHY: NORMAL
RIGHT EYE IMAGE QUALITY: NORMAL
RIGHT EYE MACULAR EDEMA: NORMAL
RIGHT EYE OTHER RETINOPATHY: NORMAL
SEVERITY (EYE EXAM): NORMAL
SODIUM SERPL-SCNC: 139 MMOL/L (ref 136–145)
TRIGL SERPL-MCNC: 227 MG/DL
TSH SERPL DL<=0.05 MIU/L-ACNC: 0.62 UIU/ML (ref 0.36–3.74)
WBC # BLD AUTO: 7.32 THOUSAND/UL (ref 4.31–10.16)

## 2021-05-21 PROCEDURE — 99214 OFFICE O/P EST MOD 30 MIN: CPT | Performed by: INTERNAL MEDICINE

## 2021-05-21 PROCEDURE — 92250 FUNDUS PHOTOGRAPHY W/I&R: CPT | Performed by: INTERNAL MEDICINE

## 2021-05-21 PROCEDURE — 36415 COLL VENOUS BLD VENIPUNCTURE: CPT

## 2021-05-21 PROCEDURE — 82570 ASSAY OF URINE CREATININE: CPT | Performed by: INTERNAL MEDICINE

## 2021-05-21 PROCEDURE — 83036 HEMOGLOBIN GLYCOSYLATED A1C: CPT

## 2021-05-21 PROCEDURE — 80053 COMPREHEN METABOLIC PANEL: CPT

## 2021-05-21 PROCEDURE — 82043 UR ALBUMIN QUANTITATIVE: CPT | Performed by: INTERNAL MEDICINE

## 2021-05-21 PROCEDURE — 80061 LIPID PANEL: CPT

## 2021-05-21 PROCEDURE — 85025 COMPLETE CBC W/AUTO DIFF WBC: CPT

## 2021-05-21 PROCEDURE — 84443 ASSAY THYROID STIM HORMONE: CPT

## 2021-05-21 NOTE — TELEPHONE ENCOUNTER
----- Message from Annabel Gomez MD sent at 5/21/2021  5:02 PM EDT -----  Labs ok, please call and inform patient

## 2021-05-21 NOTE — PROGRESS NOTES
INTERNAL MEDICINE OFFICE VISIT  [facility] [address] [place] [address]  Tel: [phone]      NAME: Keke Peacock  AGE: 78 y o  SEX: male  : 1955   MRN: [id number]    DATE: 2021  TIME: 1:03 PM      Assessment and Plan:  1  Type 2 diabetes mellitus without complication, without long-term current use of insulin (HCC)   continue present medication  A diabetic foot exam and eye exam done today in the office  He was told to get blood work done  - Hemoglobin A1C ([facility]); Future  - IRIS Diabetic eye exam  - CBC and differential; Future  - Comprehensive metabolic panel; Future  - Lipid panel; Future  - TSH, 3rd generation; Future  - Microalbumin / creatinine urine ratio    2  Essential hypertension   continue medications    3  Mixed hyperlipidemia   continue atorvastatin    4  Chronic obstructive pulmonary disease, unspecified COPD type ([address] )   continue inhalers    5  Generalized anxiety disorder   continue Prozac and BuSpar    6  Recurrent major depressive disorder, in partial remission (HCC)   stable, continue Prozac    7  Overweight  BMI Counseling: Body mass index is 26 33 kg/m²  The BMI is above normal  Nutrition recommendations include decreasing portion sizes, encouraging healthy choices of fruits and vegetables and moderation in carbohydrate intake  Exercise recommendations include moderate physical activity 150 minutes/week  No pharmacotherapy was ordered  - Counseling Documentation: patient was counseled regarding: diagnostic results, instructions for management, risk factor reductions, prognosis, patient and family education, risks and benefits of treatment options and importance of compliance with treatment  - Medication Side Effects: Adverse side effects of medications were reviewed with the patient/guardian today  Return for follow up visit in  6 months or earlier, if needed        Chief Complaint:  Chief Complaint   Patient presents with    Follow-up     6 months and labs          History of Present Illness:    he is usually very well controlled with his hemoglobin A1c but has not had blood work done recently  His blood pressure and cholesterol are usually within normal limits  He takes his inhalers and his breathing is controlled  Depression and anxiety are well controlled with Prozac and BuSpar    He was told to eat healthy      Active Problem List:  Patient Active Problem List   Diagnosis    Generalized anxiety disorder    Cardiac arrhythmia    Chronic obstructive pulmonary disease (HCC)    Recurrent major depressive disorder, in partial remission ([address] )    Diastolic dysfunction    Ebstein anomaly    GERD without esophagitis    Mixed hyperlipidemia    Essential hypertension    Hypertensive heart disease    Nonrheumatic pulmonary valve insufficiency    Overweight    Tricuspid valve disorders, non-rheumatic    Type 2 diabetes mellitus, without long-term current use of insulin (HCC)    Esophageal dysphagia         Past Medical History:  Past Medical History:   Diagnosis Date    Abnormal EKG     Alcohol abuse     Allergic rhinitis     last assessed: 6/4/2014    Anxiety     Asthma     last assesssed: 6/4/2014    Atresia of esophagus with tracheo-esophageal fistula     Benign neoplasm of colon     Campylobacter enteritis     last assessed: 5/6/2015    Cardiac arrhythmia     last assessed: 6/19/2015    Chronic cough     last assessed: 1/29/2015    Chronic fatigue syndrome     resolved: 6/4/2014    Chronic sinusitis     resolved: 6/4/2014    COPD (chronic obstructive pulmonary disease) ([address] )     Diabetes mellitus ([facility]     Digestive symptom     EKG, abnormal     Epistaxis     Generalized osteoarthritis     resolved: 6/4/2014    Heart disease     HTN (hypertension)     Hyperlipidemia     Myalgia     Myositis     Poisoning by drug or medicinal substance     Sleep apnea Past Surgical History:  Past Surgical History:   Procedure Laterality Date    COLONOSCOPY      ESOPHAGOGASTRODUODENOSCOPY N/A 10/2/2018    Procedure: ESOPHAGOGASTRODUODENOSCOPY (EGD); Surgeon: Giulia Rodas MD;  Location: [facility];  Service: Gastroenterology    FOOT SURGERY      HI ESOPHAGOGASTRODUODENOSCOPY TRANSORAL DIAGNOSTIC N/A 2018    Procedure: ESOPHAGOGASTRODUODENOSCOPY (EGD); Surgeon: Nichelle Nam MD;  Location: [facility];   Service: Gastroenterology    TONSILLECTOMY           Family History:  Family History   Problem Relation Age of Onset    Asthma Mother          Social History:  Social History     Socioeconomic History    Marital status: Single     Spouse name: None    Number of children: None    Years of education: None    Highest education level: None   Occupational History    None   Social Needs    Financial resource strain: None    Food insecurity     Worry: None     Inability: None    Transportation needs     Medical: None     Non-medical: None   Tobacco Use    Smoking status: Former Smoker     Packs/day: 0 00     Quit date: 10/2/2006     Years since quittin 6    Smokeless tobacco: Never Used    Tobacco comment: cigars-social   Substance and Sexual Activity    Alcohol use: No     Comment: rarely    Drug use: No    Sexual activity: Yes     Partners: Female   Lifestyle    Physical activity     Days per week: 3 days     Minutes per session: 40 min    Stress: Not at all   Relationships    Social connections     Talks on phone: None     Gets together: None     Attends Hoahaoism service: None     Active member of club or organization: None     Attends meetings of clubs or organizations: None     Relationship status: None    Intimate partner violence     Fear of current or ex partner: None     Emotionally abused: None     Physically abused: None     Forced sexual activity: None   Other Topics Concern    None   Social History Narrative    None Allergies:  No Known Allergies      Medications:    Current Outpatient Medications:     Advair Diskus 250-50 MCG/DOSE inhaler, TAKE 1 PUFF BY MOUTH TWICE A DAY, Disp: 3 Inhaler, Rfl: 3    albuterol (Ventolin HFA) 90 mcg/act inhaler, Inhale 2 puffs every 4 (four) hours as needed for wheezing, Disp: 1 Inhaler, Rfl: 3    aspirin (ECOTRIN LOW STRENGTH) 81 mg EC tablet, Take 1 tablet by mouth daily, Disp: , Rfl:     atorvastatin (LIPITOR) 10 mg tablet, TAKE 1 TABLET BY MOUTH EVERY DAY, Disp: 90 tablet, Rfl: 3    Blood Glucose Monitoring Suppl (ONE TOUCH ULTRA MINI) w/Device KIT, by Does not apply route daily for 30 days PT  TESTING ONCE DAILY DX:E11 9, Disp: 1 each, Rfl: 0    busPIRone (BUSPAR) 10 mg tablet, Take 10 mg by mouth 2 (two) times a day, Disp: , Rfl: 3    diltiazem (CARDIZEM CD) 180 mg 24 hr capsule, TAKE 1 CAPSULE BY MOUTH EVERY DAY, Disp: 90 capsule, Rfl: 0    FLUoxetine (PROZAC) 40 MG capsule, Take 1 capsule by mouth daily, Disp: , Rfl:     ibuprofen (MOTRIN) 600 mg tablet, Take 600 mg by mouth every 6 (six) hours as needed, Disp: , Rfl: 0    lisinopril (ZESTRIL) 20 mg tablet, Take 1 tablet (20 mg total) by mouth daily, Disp: 90 tablet, Rfl: 3    metFORMIN (GLUCOPHAGE) 500 mg tablet, TAKE 1 TABLET BY MOUTH TWICE DAILY  , Disp: 180 tablet, Rfl: 3    ONETOUCH DELICA LANCETS FINE MISC, by Does not apply route, Disp: , Rfl:     OneTouch Ultra test strip, TESTING ONCE DAILY, Disp: 100 each, Rfl: 3    temazepam (RESTORIL) 30 mg capsule, Take 1 capsule by mouth, Disp: , Rfl:     zolpidem (AMBIEN) 10 mg tablet, , Disp: , Rfl:       The following portions of the patient's history were reviewed and updated as appropriate: past medical history, past surgical history, family history, social history, allergies, current medications and active problem list       Review of Systems:  Constitutional: Denies fever, chills, weight gain, weight loss, fatigue  Eyes: Denies eye redness, eye discharge, double vision, change in visual acuity  ENT: Denies hearing loss, tinnitus, sneezing, nasal congestion, nasal discharge, sore throat   Respiratory: Denies cough, expectoration, hemoptysis, shortness of breath, wheezing  Cardiovascular: Denies chest pain, palpitations, lower extremity swelling, orthopnea, PND  Gastrointestinal: Denies abdominal pain, heartburn, nausea, vomiting, hematemesis, diarrhea, bloody stools  Genito-Urinary: Denies dysuria, frequency, difficulty in micturition, nocturia, incontinence  Musculoskeletal: Denies back pain, joint pain, muscle pain  Neurologic: Denies confusion, lightheadedness, syncope, headache, focal weakness, sensory changes, seizures  Endocrine: Denies polyuria, polydipsia, temperature intolerance  Allergy and Immunology: Denies hives, insect bite sensitivity  Hematological and Lymphatic: Denies bleeding problems, swollen glands   Psychological: Denies depression, suicidal ideation, anxiety, panic, mood swings  Dermatological: Denies pruritus, rash, skin lesion changes      Vitals:  Vitals:    05/21/21 1302   BP: 136/88   Pulse:    Temp:    SpO2:        Body mass index is 26 33 kg/m²  Weight (last 2 days)     Date/Time   Weight    05/21/21 1250   71 8 (158 2)                Physical Examination:  General: Patient is not in acute distress  Awake, alert, responding to commands  No weight gain or loss  Head: Normocephalic  Atraumatic  Eyes: Conjunctiva and lids with no swelling, erythema or discharge  Both pupils normal sized, round and reactive to light  Sclera nonicteric  ENT: External examination of nose and ear normal  Otoscopic examination shows translucent tympanic membranes with patent canals without erythema  Oropharynx moist with no erythema, edema, exudate or lesions  Neck: Supple  JVP not raised  Trachea midline  No masses  No thyromegaly  Lungs: No signs of increased work of breathing or respiratory distress   Bilateral bronchovascular breath sounds with no crackles or rhonchi  Chest wall: No tenderness  Cardiovascular: Normal PMI  No thrills  Regular rate and rhythm  S1 and S2 normal  No murmur, rub or gallop  Gastrointestinal: Abdomen soft, nontender  No guarding or rigidity  Liver and spleen not palpable  Bowel sounds present  Neurologic: Cranial nerves II-XII intact  Cortical functions normal  Motor system - Reflexes 2+ and symmetrical  Sensations normal  Musculoskeletal: Gait normal  No joint tenderness  Integumentary: Skin normal with no rash or lesions  Lymphatic: No palpable lymph nodes in neck, axilla or groin  Extremities: No clubbing, cyanosis, edema or varicosities  Psychological: Judgement and insight normal  Mood and affect normal    Patient's shoes and socks removed  Right Foot/Ankle   Right Foot Inspection  Skin Exam: skin normal and skin intact no dry skin, no warmth, no callus, no erythema, no maceration, no abnormal color, no pre-ulcer, no ulcer and no callus                          Toe Exam: ROM and strength within normal limits  Sensory     Proprioception: diminished and intact   Monofilament testing: intact  Vascular  Capillary refills: < 3 seconds  The right DP pulse is 2+  The right PT pulse is 2+  Left Foot/Ankle  Left Foot Inspection  Skin Exam: skin normal and skin intactno dry skin, no warmth, no erythema, no maceration, normal color, no pre-ulcer, no ulcer and no callus                         Toe Exam: ROM and strength within normal limits                   Sensory     Proprioception: intact  Monofilament: intact  Vascular  Capillary refills: < 3 seconds  The left DP pulse is 2+  The left PT pulse is 2+  Assign Risk Category:  No deformity present; No loss of protective sensation;  No weak pulses       Risk: 0    Laboratory Results:  CBC with diff:   Lab Results   Component Value Date    WBC 7 28 11/20/2020    WBC 8 33 10/19/2015    RBC 5 72 (H) 11/20/2020    RBC 5 46 10/19/2015    HGB 15 4 11/20/2020    HGB 14 8 10/19/2015    HCT 48 6 11/20/2020 HCT 44 9 10/19/2015    MCV 85 11/20/2020    MCV 82 10/19/2015    MCH 26 9 11/20/2020    MCH 27 1 10/19/2015    RDW 12 3 11/20/2020    RDW 13 0 10/19/2015     11/20/2020     10/19/2015       CMP:  Lab Results   Component Value Date    CREATININE 1 06 11/20/2020    CREATININE 0 85 10/19/2015    BUN 14 11/20/2020    BUN 10 10/19/2015     10/19/2015    K 4 0 11/20/2020    K 3 6 10/19/2015     11/20/2020     10/19/2015    CO2 28 11/20/2020    CO2 27 10/19/2015    GLUCOSE 142 (H) 10/19/2015    PROT 7 3 10/19/2015    ALKPHOS 93 11/20/2020    ALKPHOS 87 10/19/2015    ALT 16 11/20/2020    ALT 17 10/19/2015    AST 17 11/20/2020    AST 17 10/19/2015       Lab Results   Component Value Date    HGBA1C 6 2 (H) 11/20/2020    HGBA1C 6 7 (H) 10/19/2015       No results found for: TROPONINI, CKMB, CKTOTAL    Lipid Profile:   Lab Results   Component Value Date    CHOL 139 10/19/2015    CHOL 156 05/30/2015     Lab Results   Component Value Date    HDL 71 11/20/2020    HDL 73 03/16/2020     Lab Results   Component Value Date    LDLCALC 44 11/20/2020    LDLCALC 63 03/16/2020     Lab Results   Component Value Date    TRIG 231 (H) 11/20/2020    TRIG 144 03/16/2020       Imaging Results:       Health Maintenance:  Health Maintenance   Topic Date Due    DTaP,Tdap,and Td Vaccines (1 - Tdap) 05/05/1975    BMI: Followup Plan  09/16/2020    HEMOGLOBIN A1C  05/20/2021    DM Eye Exam  07/12/2021    Fall Risk  11/20/2021    Medicare Annual Wellness Visit (AWV)  11/20/2021    Depression Remission PHQ  11/20/2021    Diabetic Foot Exam  11/20/2021    Pneumococcal Vaccine: 65+ Years (1 of 1 - PPSV23) 02/27/2022    BMI: Adult  05/21/2022    Colorectal Cancer Screening  06/10/2025    Hepatitis C Screening  Completed    Influenza Vaccine  Completed    COVID-19 Vaccine  Completed    HIB Vaccine  Aged Out    Hepatitis B Vaccine  Aged Out    IPV Vaccine  Aged Out    Hepatitis A Vaccine  Aged C/ Richar Workman 19 Meningococcal ACWY Vaccine  Aged Out    HPV Vaccine  Aged Out     Immunization History   Administered Date(s) Administered    INFLUENZA 10/28/2016, 10/24/2018    Influenza Quadrivalent, 6-35 Months IM 10/06/2014, 10/28/2016, 09/08/2017    Influenza, high dose seasonal 0 7 mL 09/16/2019, 10/17/2020    Influenza, recombinant, quadrivalent,injectable, preservative free 10/24/2018    Influenza, seasonal, injectable 10/21/2013, 10/06/2015    Pneumococcal Polysaccharide PPV23 06/04/2014, 02/27/2017    SARS-CoV-2 / COVID-19 mRNA IM (Moderna) 02/13/2021, 03/13/2021    Tdap 1955    Zoster 01/01/2014    Zoster Vaccine Recombinant 01/01/2014         Hammad Adams MD  5/21/2021,1:03 PM

## 2021-06-10 NOTE — PROGRESS NOTES
CARDIOLOGY OFFICE VISIT  [facility]  [address], [place], [facility]– [place]  Tel: [phone]      NAME: Rossi Barrios  AGE: 71 y o  SEX: male  : 1955   MRN: [id number]      Chief Complaint:  Chief Complaint   Patient presents with    Follow-up for Ebstein's Anomaly     6 months         History of Present Illness:   Patient comes for follow up  States he is doing well from cardiac stand point and denies chest pain / pressure, SOB, palpitations, lightheadedness, syncope, swelling feet, orthopnea, PND, claudication  HTN, HHD, Diastolic dysfunction -  Has been hypertensive for many years  Taking medications regularly  Denies lightheadedness, headache, medication side effects  HLP -  Has had hyperlipidemia for many years  Taking statin regularly along with diet control  Denies myalgia  PCP closely monitoring the blood work  Ebstein's anomaly, pulmonary regurgitation - stable, asymptomatic  Denies history of atrial fibrillation   States he exercises regularly on treadmill without any problems     Past Medical History:  Past Medical History:   Diagnosis Date    Abnormal EKG     Alcohol abuse     Allergic rhinitis     last assessed: 2014    Anxiety     Asthma     last assesssed: 2014    Atresia of esophagus with tracheo-esophageal fistula     Benign neoplasm of colon     Campylobacter enteritis     last assessed: 2015    Cardiac arrhythmia     last assessed: 2015    Chronic cough     last assessed: 2015    Chronic fatigue syndrome     resolved: 2014    Chronic sinusitis     resolved: 2014    COPD [facility]chronic obstructive pulmonary disease) [facility]     Diabetes mellitus ([facility] )     Digestive symptom     EKG, abnormal     Epistaxis     Generalized osteoarthritis     resolved: 2014    Heart disease     HTN (hypertension)     Hyperlipidemia     Myalgia     Myositis     Personalized Preventive Plan for Andre Gilmore - 6/10/2021  Medicare offers a range of preventive health benefits. Some of the tests and screenings are paid in full while other may be subject to a deductible, co-insurance, and/or copay. Some of these benefits include a comprehensive review of your medical history including lifestyle, illnesses that may run in your family, and various assessments and screenings as appropriate. After reviewing your medical record and screening and assessments performed today your provider may have ordered immunizations, labs, imaging, and/or referrals for you. A list of these orders (if applicable) as well as your Preventive Care list are included within your After Visit Summary for your review. Other Preventive Recommendations:    · A preventive eye exam performed by an eye specialist is recommended every 1-2 years to screen for glaucoma; cataracts, macular degeneration, and other eye disorders. · A preventive dental visit is recommended every 6 months. · Try to get at least 150 minutes of exercise per week or 10,000 steps per day on a pedometer . · Order or download the FREE \"Exercise & Physical Activity: Your Everyday Guide\" from The [employer]. Call 1-432.353.9876 or search The [employer] online. · You need 8274-5635 mg of calcium and 6071-8086 IU of vitamin D per day. It is possible to meet your calcium requirement with diet alone, but a vitamin D supplement is usually necessary to meet this goal.  · When exposed to the sun, use a sunscreen that protects against both UVA and UVB radiation with an SPF of 30 or greater. Reapply every 2 to 3 hours or after sweating, drying off with a towel, or swimming. · Always wear a seat belt when traveling in a car. Always wear a helmet when riding a bicycle or motorcycle. Poisoning by drug or medicinal substance     Sleep apnea          Past Surgical History:  Past Surgical History:   Procedure Laterality Date    COLONOSCOPY      ESOPHAGOGASTRODUODENOSCOPY N/A 10/2/2018    Procedure: ESOPHAGOGASTRODUODENOSCOPY (EGD); Surgeon: Ravinder Freedman MD;  Location: [facility];  Service: Gastroenterology    FOOT SURGERY      [place] ESOPHAGOGASTRODUODENOSCOPY TRANSORAL DIAGNOSTIC N/A 2018    Procedure: ESOPHAGOGASTRODUODENOSCOPY (EGD); Surgeon: Antonio Miles MD;  Location: [facility]; Service: Gastroenterology    TONSILLECTOMY           Family History:  Family History   Problem Relation Age of Onset    Asthma Mother          Social History:  Social History     Socioeconomic History    Marital status: Single     Spouse name: None    Number of children: None    Years of education: None    Highest education level: None   Occupational History    None   Social Needs    Financial resource strain: None    Food insecurity:     Worry: None     Inability: None    Transportation needs:     Medical: None     Non-medical: None   Tobacco Use    Smoking status: Former Smoker     Packs/day: 0 00     Last attempt to quit: 10/2/2006     Years since quittin 8    Smokeless tobacco: Never Used    Tobacco comment: cigars-social   Substance and Sexual Activity    Alcohol use: No     Comment: rarely    Drug use: No    Sexual activity: Yes     Partners: Female   Lifestyle    Physical activity:     Days per week: 3 days     Minutes per session: 40 min    Stress:  To some extent   Relationships    Social connections:     Talks on phone: None     Gets together: None     Attends [employer] service: None     Active member of club or organization: None     Attends meetings of clubs or organizations: None     Relationship status: None    Intimate partner violence:     Fear of current or ex partner: None     Emotionally abused: None     Physically abused: None     Forced sexual activity: None   Other Topics Concern    None   Social History Narrative    None         Active Problems:  Patient Active Problem List   Diagnosis    Generalized anxiety disorder    Cardiac arrhythmia    Chronic obstructive pulmonary disease (HCC)    Recurrent major depressive disorder, [facility] partial remission [facility]    Diastolic dysfunction    Ebstein anomaly    GERD without esophagitis    Mixed hyperlipidemia    Essential hypertension    Hypertensive heart disease    Nonrheumatic pulmonary valve insufficiency    Overweight    Tricuspid valve disorders, non-rheumatic    Type 2 diabetes mellitus, without long-term current use of insulin (HCC)    Esophageal dysphagia         The following portions of the patient's history were reviewed and updated as appropriate: past medical history, past surgical history, past family history,  past social history, current medications, allergies and problem list       Review of Systems:  Constitutional: Denies fever, chills, fatigue  Eyes: Denies eye redness, eye discharge, double vision  ENT: Denies hearing loss, tinnitus, sneezing, nasal discharge, sore throat   Respiratory: Denies cough, expectoration, hemoptysis, shortness of breath  Cardiovascular: Denies chest pain, palpitations, orthopnea, PND, lower extremity swelling  Gastrointestinal: Denies abdominal pain, nausea, vomiting, hematemesis, diarrhea, bloody stools  Genito-Urinary: Denies dysuria, incontinence  Musculoskeletal: Denies back pain, joint pain, muscle pain  Neurologic: Denies confusion, lightheadedness, syncope, headache, focal weakness, sensory changes, seizures  Endocrine: Denies polyuria, polydipsia, temperature intolerance  Allergy and Immunology: Denies hives, insect bite sensitivity  Hematological and Lymphatic: Denies bleeding problems, swollen glands   Psychological: Denies depression, suicidal ideation, anxiety, panic  Dermatological: Denies pruritus, rash, skin lesion changes      Vitals:  Vitals:    08/06/19 0852   BP: 130/80   Pulse: 80   SpO2: 97%       Body mass index is 26 13 kg/m²  Weight (last 2 days)     Date/Time   Weight    08/06/19 0852   71 2 (157)                Physical Examination:  General: Patient is not in acute distress  Awake, alert, oriented in time, place and person  Head: Normocephalic  Atraumatic  Eyes: Nonicteric  ENT: Normal external ear canals  Nares normal, no drainage  Lips and oral mucosa normal  Neck: Supple  JVP not raised  Trachea central  No thyromegaly  Lungs: Bilateral bronchovascular breath sounds with no crackles or rhonchi  Chest wall: No tenderness  Cardiovascular: RRR  S1 and S2 normal  No murmur, rub or gallop  Gastrointestinal: Abdomen soft, nontender  No guarding or rigidity  Bowel sounds present  Neurologic: Patient is awake, alert, oriented in time, place and person  Responding to command  Moving all extremities  Integumentary:  No skin rash  Lymphatic: No cervical lymphadenopathy  Back: Symmetric   No CVA tenderness  Extremities: No clubbing, cyanosis or edema      Laboratory Results:  CBC with diff:   Lab Results   Component Value Date    WBC 8 72 02/20/2019    WBC 8 33 10/19/2015    RBC 5 60 02/20/2019    RBC 5 46 10/19/2015    HGB 15 5 02/20/2019    HGB 14 8 10/19/2015    HCT 48 0 02/20/2019    HCT 44 9 10/19/2015    MCV 86 02/20/2019    MCV 82 10/19/2015    MCH 27 7 02/20/2019    MCH 27 1 10/19/2015    RDW 12 4 02/20/2019    RDW 13 0 10/19/2015     02/20/2019     10/19/2015       CMP:  Lab Results   Component Value Date    CREATININE 0 96 02/20/2019    CREATININE 0 85 10/19/2015    BUN 16 02/20/2019    BUN 10 10/19/2015     10/19/2015    K 4 2 02/20/2019    K 3 6 10/19/2015     02/20/2019     10/19/2015    CO2 28 02/20/2019    CO2 27 10/19/2015    GLUCOSE 142 (H) 10/19/2015    PROT 7 3 10/19/2015    ALKPHOS 83 02/20/2019    ALKPHOS 87 10/19/2015    ALT 14 02/20/2019    ALT 17 10/19/2015    AST 18 2019    AST 17 10/19/2015       Lab Results   Component Value Date    HGBA1C 6 1 2019    HGBA1C 6 7 (H) 10/19/2015       Lipid Profile:   Lab Results   Component Value Date    CHOL 139 10/19/2015    CHOL 156 2015    CHOL 192 2015     Lab Results   Component Value Date    HDL 77 (H) 2019    HDL 67 (H) 2018    HDL 55 2018     Lab Results   Component Value Date    LDLCALC 73 2019    LDLCALC 74 2018    LDLCALC 71 2018     Lab Results   Component Value Date    TRIG 63 2019    TRIG 136 2018    TRIG 108 2018       Cardiac testing:   Results for orders placed during the hospital encounter of 10/19/18   Echo complete with contrast if indicated    Narrative [place] [address], [address] [facility]  [phone]    Transthoracic Echocardiogram  2D, M-mode, and Color Doppler    Study date:  19-Oct-2018    Patient: Oli Vazquez  MR number: [id number]  Account number: [de-identified]  : 1955  Age: 58 years  Gender: Male  Status: Outpatient  Location: [facility]  Height: 64 in  Weight: 157 lb  BP: 137/ 76 mmHg    Indications: Ebstein's anomaly    Diagnoses: Q22 5 - Ebstein's anomaly    Sonographer:  Askew RCS  Interpreting Physician:  Edwige Stanley MD  Primary Physician:  Dexter Mix MD  Referring Physician:  Edwige Stanley MD  Group:  [facility]    SUMMARY    LEFT VENTRICLE:  Systolic function was normal  Ejection fraction was estimated to be 60 %  There were no regional wall motion abnormalities  Doppler parameters were consistent with abnormal left ventricular relaxation (grade 1 diastolic dysfunction)  RIGHT VENTRICLE:  Systolic function was normal     MITRAL VALVE:  There was trace regurgitation  TRICUSPID VALVE:  There was mild apical displacement suggestive of mild Ebstein's anamoly  There was trace regurgitation    Pulmonary artery systolic pressure was within the normal range  PULMONIC VALVE:  There was trace regurgitation  HISTORY: PRIOR HISTORY: Ebstein's anomaly  Medication-treated hyperlipidemia  Risk factors: hypertension and diabetes  Chronic lung disease  Abnormal ECG  PROCEDURE: The study was performed in the [address] [place] [address]  This was a routine study  The transthoracic approach was used  The study included complete 2D imaging, M-mode, and color Doppler  The heart rate was 79 bpm, at the  start of the study  Images were obtained from the parasternal, apical, subcostal, and suprasternal notch acoustic windows  Image quality was adequate  LEFT VENTRICLE: Size was normal  Systolic function was normal  Ejection fraction was estimated to be 60 %  There were no regional wall motion abnormalities  Wall thickness was normal  No evidence of apical thrombus  DOPPLER: Doppler  parameters were consistent with abnormal left ventricular relaxation (grade 1 diastolic dysfunction)  RIGHT VENTRICLE: The size was normal  Systolic function was normal  Wall thickness was normal     LEFT ATRIUM: Size was normal     RIGHT ATRIUM: Size was normal     MITRAL VALVE: Valve structure was normal  There was normal leaflet separation  DOPPLER: The transmitral velocity was within the normal range  There was no evidence for stenosis  There was trace regurgitation  AORTIC VALVE: The valve was trileaflet  Leaflets exhibited normal thickness and normal cuspal separation  DOPPLER: Transaortic velocity was within the normal range  There was no evidence for stenosis  There was no significant  regurgitation  TRICUSPID VALVE: There was normal leaflet separation  There was mild apical displacement suggestive of mild Ebstein's anamoly  DOPPLER: The transtricuspid velocity was within the normal range  There was no evidence for stenosis  There was  trace regurgitation  Pulmonary artery systolic pressure was within the normal range      PULMONIC VALVE: Leaflets exhibited normal thickness, no calcification, and normal cuspal separation  DOPPLER: The transpulmonic velocity was within the normal range  There was trace regurgitation  PERICARDIUM: There was no pericardial effusion  The pericardium was normal in appearance  AORTA: The root exhibited normal size  SYSTEMIC VEINS: IVC: The inferior vena cava was not well visualized  SYSTEM MEASUREMENT TABLES    Apical four chamber  4 chamber Left Atrium Volume Index; Planimetry; End Systole; Apical four chamber;: 13 66 cm2  Left Ventricular Diastolic Area; Method of Disks, Single Plane; End Diastole; Apical four chamber;: 31 26 cm2  Left Ventricular Ejection Fraction; Method of Disks, Single Plane; Apical four chamber;: 60 3 %  Left Ventricular systolic Area; Method of Disks, Single Plane; End Systole; Apical four chamber;: 17 94 cm2  Right Atrium Systolic Area; Planimetry; End Systole; Apical four chamber;: 13 95 cm2  Right Ventricular Internal Diastolic Dimension; End Diastole; Apical four chamber;: 37 8 mm  TAPSE: 21 7 mm    Unspecified Scan Mode  Aortic Root Diameter; End Systole;: 38 1 mm  Gradient Pressure, Peak; Simplified Bernoulli; Antegrade Flow; Systole;: 5 7 mm[Hg]  Gradient pressure, average; Simplified Bernoulli; Antegrade Flow; Systole;: 3 5 mm[Hg]  Left atrial diameter; End Diastole;: 40 5 mm  Cardiac Output; Teichholz; Systole;: 5 43 L/min  Heart rate; Teichholz;: 94 /min  Interventricular Septum Diastolic Thickness; Teichholz; End Diastole;: 12 2 mm  Left Ventricle Internal End Diastolic Dimension; Teichholz;: 45 8 mm  Left Ventricle Internal Systolic Dimension; Teichholz; End Systole;: 31 2 mm  Left Ventricle Mass; Mass AVCube with Teichholz; End Diastole;: 185 g  Left Ventricle Posterior Wall Diastolic Thickness; Teichholz; End Diastole;: 10 2 mm  Left Ventricular Ejection Fraction; Teichholz;: 60 %  Left Ventricular End Diastolic Volume;  Teichholz;: 96 3 ml  Left Ventricular End Systolic Volume; Teichholz;: 38 5 ml  Left Ventricular Fractional Shortening;: 31 9 %  Stroke volume; Teichholz; Systole;: 57 8 ml  Mitral Valve Area; Area by Pressure Half-Time; Systole;: 3 01 cm2  Mitral Valve E to A Ratio; Systole;: 0 52  Pressure half time; Diastole;: 0 07 s  Maximum Tricuspid valve regurgitation pressure gradient; Regurgitant Flow; Systole;: 27 9 mm[Hg]    [facility] Accredited Echocardiography Laboratory    Prepared and electronically signed by    Sergio Head MD  Signed 19-Oct-2018 13:18:16         Medications:    Current Outpatient Medications:     ADVAIR DISKUS 250-50 MCG/DOSE inhaler, TAKE 1 PUFF BY MOUTH TWICE A DAY, Disp: 1 Inhaler, Rfl: 1    albuterol (VENTOLIN HFA) 90 mcg/act inhaler, Inhale 2 puffs every 4 (four) hours as needed for wheezing, Disp: 1 Inhaler, Rfl: 3    aspirin (ECOTRIN LOW STRENGTH) 81 mg EC tablet, Take 1 tablet by mouth daily, Disp: , Rfl:     atorvastatin (LIPITOR) 10 mg tablet, Take 1 tablet (10 mg total) by mouth daily, Disp: 90 tablet, Rfl: 3    Blood Glucose Monitoring Suppl (ONE TOUCH ULTRA MINI) w/Device KIT, by Does not apply route daily for 30 days PT  TESTING ONCE DAILY DX:E11 9, Disp: 1 each, Rfl: 0    busPIRone (BUSPAR) 10 mg tablet, Take 10 mg by mouth 2 (two) times a day, Disp: , Rfl: 3    diltiazem (TIAZAC) 180 MG 24 hr capsule, Take 1 capsule (180 mg total) by mouth daily, Disp: 90 capsule, Rfl: 3    FLUoxetine (PROZAC) 40 MG capsule, Take 1 capsule by mouth daily, Disp: , Rfl:     glucose blood (ONETOUCH VERIO) test strip, PT  TESTING ONCE DAILY DX E11 9, Disp: 100 each, Rfl: 3    lisinopril (ZESTRIL) 20 mg tablet, Take 1 tablet (20 mg total) by mouth daily, Disp: 90 tablet, Rfl: 3    metFORMIN (GLUCOPHAGE) 500 mg tablet, TAKE 1 TABLET BY MOUTH TWICE DAILY  , Disp: 180 tablet, Rfl: 3    ONE TOUCH LANCETS MISC, by Does not apply route daily for 30 days PT TESTING ONCE DAILY  DX:E11 9, Disp: 100 each, Rfl: 3    temazepam (RESTORIL) 30 mg capsule, Take 1 capsule by mouth, Disp: , Rfl:     zolpidem (AMBIEN) 10 mg tablet, , Disp: , Rfl:     omeprazole (PriLOSEC) 20 mg delayed release capsule, TAKE 1 CAPSULE (20 MG TOTAL) BY MOUTH DAILY FOR 30 DAYS (Patient not taking: Reported on 8/6/2019), Disp: 30 capsule, Rfl: 0    ONETOUCH DELICA LANCETS FINE MISC, by Does not apply route, Disp: , Rfl:       Allergies:  No Known Allergies      Assessment and Plan:  1  Ebstein anomaly   stable  Asymptomatic  Follow-up with serial echocardiograms    2  Essential hypertension, Hypertensive heart disease without heart failure, Diastolic dysfunction   BP stable  Continue current medications    3  Mixed hyperlipidemia   continue statin and diet control  His PCP closely monitors his blood work    4  Nonrheumatic pulmonary valve insufficiency   stable  Asymptomatic  Follow-up with serial echocardiograms    Recommend aggressive risk factor modification and therapeutic lifestyle changes  Low-salt, low-calorie, low-fat, low-cholesterol diet with regular exercise and to optimize weight  I will defer the ordering and monitoring of necessity lab studies to you, but I am available and happy to review and manage any of the data at your request in the future  Discussed concepts of atherosclerosis, including signs and symptoms of cardiac disease  Previous studies were reviewed  Safety measures were reviewed  Questions were entertained and answered  Patient was advised to report any problems requiring medical attention  Follow-up with PCP and appropriate specialist and lab work as discussed  Return for follow up visit as scheduled or earlier, if needed  Thank you for allowing me to participate in the care and evaluation of your patient  Should you have any questions, please feel free to contact me        Balaji Mosqueda MD  8/6/2019,9:22 AM

## 2021-06-20 DIAGNOSIS — I10 ESSENTIAL HYPERTENSION: ICD-10-CM

## 2021-06-21 RX ORDER — DILTIAZEM HYDROCHLORIDE 180 MG/1
CAPSULE, COATED, EXTENDED RELEASE ORAL
Qty: 90 CAPSULE | Refills: 0 | Status: SHIPPED | OUTPATIENT
Start: 2021-06-21 | End: 2021-08-11

## 2021-08-11 DIAGNOSIS — I10 ESSENTIAL HYPERTENSION: ICD-10-CM

## 2021-08-11 RX ORDER — DILTIAZEM HYDROCHLORIDE 180 MG/1
CAPSULE, COATED, EXTENDED RELEASE ORAL
Qty: 90 CAPSULE | Refills: 0 | Status: SHIPPED | OUTPATIENT
Start: 2021-08-11 | End: 2021-11-19

## 2021-11-19 ENCOUNTER — APPOINTMENT (OUTPATIENT)
Dept: LAB | Facility: CLINIC | Age: 66
End: 2021-11-19
Payer: MEDICARE

## 2021-11-19 DIAGNOSIS — E11.9 TYPE 2 DIABETES MELLITUS WITHOUT COMPLICATION, WITHOUT LONG-TERM CURRENT USE OF INSULIN (HCC): ICD-10-CM

## 2021-11-19 LAB
ALBUMIN SERPL BCP-MCNC: 3.5 G/DL (ref 3.5–5)
ALP SERPL-CCNC: 82 U/L (ref 46–116)
ALT SERPL W P-5'-P-CCNC: 13 U/L (ref 12–78)
ANION GAP SERPL CALCULATED.3IONS-SCNC: 5 MMOL/L (ref 4–13)
AST SERPL W P-5'-P-CCNC: 16 U/L (ref 5–45)
BASOPHILS # BLD AUTO: 0.05 THOUSANDS/ΜL (ref 0–0.1)
BASOPHILS NFR BLD AUTO: 1 % (ref 0–1)
BILIRUB SERPL-MCNC: 0.67 MG/DL (ref 0.2–1)
BUN SERPL-MCNC: 14 MG/DL (ref 5–25)
CALCIUM SERPL-MCNC: 8.5 MG/DL (ref 8.3–10.1)
CHLORIDE SERPL-SCNC: 107 MMOL/L (ref 100–108)
CHOLEST SERPL-MCNC: 168 MG/DL (ref 50–200)
CO2 SERPL-SCNC: 26 MMOL/L (ref 21–32)
CREAT SERPL-MCNC: 0.97 MG/DL (ref 0.6–1.3)
EOSINOPHIL # BLD AUTO: 0.3 THOUSAND/ΜL (ref 0–0.61)
EOSINOPHIL NFR BLD AUTO: 4 % (ref 0–6)
ERYTHROCYTE [DISTWIDTH] IN BLOOD BY AUTOMATED COUNT: 12.2 % (ref 11.6–15.1)
EST. AVERAGE GLUCOSE BLD GHB EST-MCNC: 137 MG/DL
GFR SERPL CREATININE-BSD FRML MDRD: 80 ML/MIN/1.73SQ M
GLUCOSE P FAST SERPL-MCNC: 123 MG/DL (ref 65–99)
HBA1C MFR BLD: 6.4 %
HCT VFR BLD AUTO: 48 % (ref 36.5–49.3)
HDLC SERPL-MCNC: 67 MG/DL
HGB BLD-MCNC: 15.4 G/DL (ref 12–17)
IMM GRANULOCYTES # BLD AUTO: 0.02 THOUSAND/UL (ref 0–0.2)
IMM GRANULOCYTES NFR BLD AUTO: 0 % (ref 0–2)
LDLC SERPL CALC-MCNC: 65 MG/DL (ref 0–100)
LYMPHOCYTES # BLD AUTO: 3.08 THOUSANDS/ΜL (ref 0.6–4.47)
LYMPHOCYTES NFR BLD AUTO: 39 % (ref 14–44)
MCH RBC QN AUTO: 26.6 PG (ref 26.8–34.3)
MCHC RBC AUTO-ENTMCNC: 32.1 G/DL (ref 31.4–37.4)
MCV RBC AUTO: 83 FL (ref 82–98)
MONOCYTES # BLD AUTO: 0.75 THOUSAND/ΜL (ref 0.17–1.22)
MONOCYTES NFR BLD AUTO: 9 % (ref 4–12)
NEUTROPHILS # BLD AUTO: 3.8 THOUSANDS/ΜL (ref 1.85–7.62)
NEUTS SEG NFR BLD AUTO: 47 % (ref 43–75)
NONHDLC SERPL-MCNC: 101 MG/DL
NRBC BLD AUTO-RTO: 0 /100 WBCS
PLATELET # BLD AUTO: 238 THOUSANDS/UL (ref 149–390)
PMV BLD AUTO: 10.2 FL (ref 8.9–12.7)
POTASSIUM SERPL-SCNC: 3.9 MMOL/L (ref 3.5–5.3)
PROT SERPL-MCNC: 7.5 G/DL (ref 6.4–8.2)
RBC # BLD AUTO: 5.79 MILLION/UL (ref 3.88–5.62)
SODIUM SERPL-SCNC: 138 MMOL/L (ref 136–145)
TRIGL SERPL-MCNC: 181 MG/DL
TSH SERPL DL<=0.05 MIU/L-ACNC: 1.49 UIU/ML (ref 0.36–3.74)
WBC # BLD AUTO: 8 THOUSAND/UL (ref 4.31–10.16)

## 2021-11-19 PROCEDURE — 84443 ASSAY THYROID STIM HORMONE: CPT

## 2021-11-19 PROCEDURE — 85025 COMPLETE CBC W/AUTO DIFF WBC: CPT

## 2021-11-19 PROCEDURE — 36415 COLL VENOUS BLD VENIPUNCTURE: CPT

## 2021-11-19 PROCEDURE — 80053 COMPREHEN METABOLIC PANEL: CPT

## 2021-11-19 PROCEDURE — 80061 LIPID PANEL: CPT

## 2021-11-19 PROCEDURE — 83036 HEMOGLOBIN GLYCOSYLATED A1C: CPT

## 2021-12-01 ENCOUNTER — OFFICE VISIT (OUTPATIENT)
Dept: INTERNAL MEDICINE CLINIC | Facility: CLINIC | Age: 66
End: 2021-12-01
Payer: MEDICARE

## 2021-12-01 VITALS
OXYGEN SATURATION: 95 % | HEART RATE: 75 BPM | WEIGHT: 155 LBS | TEMPERATURE: 97.9 F | HEIGHT: 65 IN | SYSTOLIC BLOOD PRESSURE: 110 MMHG | BODY MASS INDEX: 25.83 KG/M2 | DIASTOLIC BLOOD PRESSURE: 78 MMHG

## 2021-12-01 DIAGNOSIS — E78.2 MIXED HYPERLIPIDEMIA: ICD-10-CM

## 2021-12-01 DIAGNOSIS — Z00.00 MEDICARE ANNUAL WELLNESS VISIT, SUBSEQUENT: ICD-10-CM

## 2021-12-01 DIAGNOSIS — F33.41 RECURRENT MAJOR DEPRESSIVE DISORDER, IN PARTIAL REMISSION (HCC): ICD-10-CM

## 2021-12-01 DIAGNOSIS — F41.1 GENERALIZED ANXIETY DISORDER: ICD-10-CM

## 2021-12-01 DIAGNOSIS — J44.9 CHRONIC OBSTRUCTIVE PULMONARY DISEASE, UNSPECIFIED COPD TYPE (HCC): ICD-10-CM

## 2021-12-01 DIAGNOSIS — I10 ESSENTIAL HYPERTENSION: ICD-10-CM

## 2021-12-01 DIAGNOSIS — E11.9 TYPE 2 DIABETES MELLITUS WITHOUT COMPLICATION, WITHOUT LONG-TERM CURRENT USE OF INSULIN (HCC): Primary | ICD-10-CM

## 2021-12-01 PROCEDURE — 1123F ACP DISCUSS/DSCN MKR DOCD: CPT | Performed by: INTERNAL MEDICINE

## 2021-12-01 PROCEDURE — 99214 OFFICE O/P EST MOD 30 MIN: CPT | Performed by: INTERNAL MEDICINE

## 2021-12-01 PROCEDURE — G0439 PPPS, SUBSEQ VISIT: HCPCS | Performed by: INTERNAL MEDICINE

## 2021-12-06 DIAGNOSIS — E78.2 MIXED HYPERLIPIDEMIA: ICD-10-CM

## 2021-12-06 DIAGNOSIS — E11.9 TYPE 2 DIABETES MELLITUS WITHOUT COMPLICATION, WITHOUT LONG-TERM CURRENT USE OF INSULIN (HCC): ICD-10-CM

## 2021-12-06 RX ORDER — ATORVASTATIN CALCIUM 10 MG/1
TABLET, FILM COATED ORAL
Qty: 90 TABLET | Refills: 2 | Status: SHIPPED | OUTPATIENT
Start: 2021-12-06 | End: 2022-07-06

## 2021-12-23 DIAGNOSIS — J44.9 COPD MIXED TYPE (HCC): ICD-10-CM

## 2022-01-05 DIAGNOSIS — I10 ESSENTIAL HYPERTENSION: ICD-10-CM

## 2022-01-05 RX ORDER — LISINOPRIL 20 MG/1
TABLET ORAL
Qty: 90 TABLET | Refills: 3 | Status: SHIPPED | OUTPATIENT
Start: 2022-01-05

## 2022-01-05 RX ORDER — DILTIAZEM HYDROCHLORIDE 180 MG/1
CAPSULE, COATED, EXTENDED RELEASE ORAL
Qty: 90 CAPSULE | Refills: 0 | Status: SHIPPED | OUTPATIENT
Start: 2022-01-05 | End: 2022-04-23

## 2022-03-24 DIAGNOSIS — J44.9 COPD MIXED TYPE (HCC): ICD-10-CM

## 2022-04-23 DIAGNOSIS — I10 ESSENTIAL HYPERTENSION: ICD-10-CM

## 2022-04-23 RX ORDER — DILTIAZEM HYDROCHLORIDE 180 MG/1
CAPSULE, COATED, EXTENDED RELEASE ORAL
Qty: 90 CAPSULE | Refills: 0 | Status: SHIPPED | OUTPATIENT
Start: 2022-04-23 | End: 2022-07-06

## 2022-05-10 ENCOUNTER — APPOINTMENT (OUTPATIENT)
Dept: LAB | Facility: CLINIC | Age: 67
End: 2022-05-10
Payer: MEDICARE

## 2022-05-10 DIAGNOSIS — E11.9 TYPE 2 DIABETES MELLITUS WITHOUT COMPLICATION, WITHOUT LONG-TERM CURRENT USE OF INSULIN (HCC): ICD-10-CM

## 2022-05-10 LAB
ALBUMIN SERPL BCP-MCNC: 3.5 G/DL (ref 3.5–5)
ALP SERPL-CCNC: 80 U/L (ref 46–116)
ALT SERPL W P-5'-P-CCNC: 16 U/L (ref 12–78)
ANION GAP SERPL CALCULATED.3IONS-SCNC: 4 MMOL/L (ref 4–13)
AST SERPL W P-5'-P-CCNC: 17 U/L (ref 5–45)
BASOPHILS # BLD AUTO: 0.05 THOUSANDS/ΜL (ref 0–0.1)
BASOPHILS NFR BLD AUTO: 1 % (ref 0–1)
BILIRUB SERPL-MCNC: 0.58 MG/DL (ref 0.2–1)
BUN SERPL-MCNC: 12 MG/DL (ref 5–25)
CALCIUM SERPL-MCNC: 9.1 MG/DL (ref 8.3–10.1)
CHLORIDE SERPL-SCNC: 104 MMOL/L (ref 100–108)
CHOLEST SERPL-MCNC: 161 MG/DL
CO2 SERPL-SCNC: 28 MMOL/L (ref 21–32)
CREAT SERPL-MCNC: 0.99 MG/DL (ref 0.6–1.3)
CREAT UR-MCNC: 117 MG/DL
EOSINOPHIL # BLD AUTO: 0.33 THOUSAND/ΜL (ref 0–0.61)
EOSINOPHIL NFR BLD AUTO: 4 % (ref 0–6)
ERYTHROCYTE [DISTWIDTH] IN BLOOD BY AUTOMATED COUNT: 12.2 % (ref 11.6–15.1)
EST. AVERAGE GLUCOSE BLD GHB EST-MCNC: 134 MG/DL
GFR SERPL CREATININE-BSD FRML MDRD: 77 ML/MIN/1.73SQ M
GLUCOSE P FAST SERPL-MCNC: 123 MG/DL (ref 65–99)
HBA1C MFR BLD: 6.3 %
HCT VFR BLD AUTO: 47.4 % (ref 36.5–49.3)
HDLC SERPL-MCNC: 67 MG/DL
HGB BLD-MCNC: 14.9 G/DL (ref 12–17)
IMM GRANULOCYTES # BLD AUTO: 0.02 THOUSAND/UL (ref 0–0.2)
IMM GRANULOCYTES NFR BLD AUTO: 0 % (ref 0–2)
LDLC SERPL CALC-MCNC: 53 MG/DL (ref 0–100)
LYMPHOCYTES # BLD AUTO: 2.92 THOUSANDS/ΜL (ref 0.6–4.47)
LYMPHOCYTES NFR BLD AUTO: 36 % (ref 14–44)
MCH RBC QN AUTO: 26.9 PG (ref 26.8–34.3)
MCHC RBC AUTO-ENTMCNC: 31.4 G/DL (ref 31.4–37.4)
MCV RBC AUTO: 86 FL (ref 82–98)
MICROALBUMIN UR-MCNC: 7 MG/L (ref 0–20)
MICROALBUMIN/CREAT 24H UR: 6 MG/G CREATININE (ref 0–30)
MONOCYTES # BLD AUTO: 0.74 THOUSAND/ΜL (ref 0.17–1.22)
MONOCYTES NFR BLD AUTO: 9 % (ref 4–12)
NEUTROPHILS # BLD AUTO: 4.01 THOUSANDS/ΜL (ref 1.85–7.62)
NEUTS SEG NFR BLD AUTO: 50 % (ref 43–75)
NONHDLC SERPL-MCNC: 94 MG/DL
NRBC BLD AUTO-RTO: 0 /100 WBCS
PLATELET # BLD AUTO: 255 THOUSANDS/UL (ref 149–390)
PMV BLD AUTO: 10.2 FL (ref 8.9–12.7)
POTASSIUM SERPL-SCNC: 3.8 MMOL/L (ref 3.5–5.3)
PROT SERPL-MCNC: 7.2 G/DL (ref 6.4–8.2)
RBC # BLD AUTO: 5.53 MILLION/UL (ref 3.88–5.62)
SODIUM SERPL-SCNC: 136 MMOL/L (ref 136–145)
TRIGL SERPL-MCNC: 205 MG/DL
TSH SERPL DL<=0.05 MIU/L-ACNC: 1.45 UIU/ML (ref 0.45–4.5)
WBC # BLD AUTO: 8.07 THOUSAND/UL (ref 4.31–10.16)

## 2022-05-10 PROCEDURE — 80053 COMPREHEN METABOLIC PANEL: CPT

## 2022-05-10 PROCEDURE — 82570 ASSAY OF URINE CREATININE: CPT

## 2022-05-10 PROCEDURE — 85025 COMPLETE CBC W/AUTO DIFF WBC: CPT

## 2022-05-10 PROCEDURE — 84443 ASSAY THYROID STIM HORMONE: CPT

## 2022-05-10 PROCEDURE — 36415 COLL VENOUS BLD VENIPUNCTURE: CPT

## 2022-05-10 PROCEDURE — 83036 HEMOGLOBIN GLYCOSYLATED A1C: CPT

## 2022-05-10 PROCEDURE — 82043 UR ALBUMIN QUANTITATIVE: CPT

## 2022-05-10 PROCEDURE — 80061 LIPID PANEL: CPT

## 2022-06-03 ENCOUNTER — RA CDI HCC (OUTPATIENT)
Dept: OTHER | Facility: HOSPITAL | Age: 67
End: 2022-06-03

## 2022-06-03 NOTE — PROGRESS NOTES
[facility] [address]  coding opportunities       Chart reviewed, no opportunity found: CHART REVIEWED, NO OPPORTUNITY FOUND        Patients Insurance     Medicare Insurance: Medicare

## 2022-07-01 ENCOUNTER — OFFICE VISIT (OUTPATIENT)
Dept: INTERNAL MEDICINE CLINIC | Facility: CLINIC | Age: 67
End: 2022-07-01
Payer: MEDICARE

## 2022-07-01 VITALS
OXYGEN SATURATION: 96 % | BODY MASS INDEX: 26.16 KG/M2 | HEART RATE: 74 BPM | WEIGHT: 157 LBS | SYSTOLIC BLOOD PRESSURE: 120 MMHG | TEMPERATURE: 97.4 F | RESPIRATION RATE: 16 BRPM | HEIGHT: 65 IN | DIASTOLIC BLOOD PRESSURE: 82 MMHG

## 2022-07-01 DIAGNOSIS — E78.2 MIXED HYPERLIPIDEMIA: ICD-10-CM

## 2022-07-01 DIAGNOSIS — F41.1 GENERALIZED ANXIETY DISORDER: ICD-10-CM

## 2022-07-01 DIAGNOSIS — J44.9 CHRONIC OBSTRUCTIVE PULMONARY DISEASE, UNSPECIFIED COPD TYPE (HCC): ICD-10-CM

## 2022-07-01 DIAGNOSIS — I10 ESSENTIAL HYPERTENSION: ICD-10-CM

## 2022-07-01 DIAGNOSIS — E66.3 OVERWEIGHT: ICD-10-CM

## 2022-07-01 DIAGNOSIS — E11.9 TYPE 2 DIABETES MELLITUS WITHOUT COMPLICATION, WITHOUT LONG-TERM CURRENT USE OF INSULIN (HCC): Primary | ICD-10-CM

## 2022-07-01 DIAGNOSIS — J20.9 ACUTE BRONCHITIS, UNSPECIFIED ORGANISM: ICD-10-CM

## 2022-07-01 DIAGNOSIS — F33.41 RECURRENT MAJOR DEPRESSIVE DISORDER, IN PARTIAL REMISSION (HCC): ICD-10-CM

## 2022-07-01 PROCEDURE — 99214 OFFICE O/P EST MOD 30 MIN: CPT | Performed by: INTERNAL MEDICINE

## 2022-07-01 RX ORDER — PREDNISONE 10 MG/1
TABLET ORAL
Qty: 30 TABLET | Refills: 0 | Status: SHIPPED | OUTPATIENT
Start: 2022-07-01

## 2022-07-01 RX ORDER — BENZONATATE 100 MG/1
100 CAPSULE ORAL 3 TIMES DAILY PRN
Qty: 30 CAPSULE | Refills: 0 | Status: SHIPPED | OUTPATIENT
Start: 2022-07-01

## 2022-07-01 RX ORDER — AMOXICILLIN AND CLAVULANATE POTASSIUM 875; 125 MG/1; MG/1
1 TABLET, FILM COATED ORAL EVERY 12 HOURS SCHEDULED
Qty: 14 TABLET | Refills: 0 | Status: SHIPPED | OUTPATIENT
Start: 2022-07-01 | End: 2022-07-08

## 2022-07-01 RX ORDER — ALBUTEROL SULFATE 90 UG/1
2 AEROSOL, METERED RESPIRATORY (INHALATION) EVERY 4 HOURS PRN
Qty: 18 G | Refills: 5 | Status: SHIPPED | OUTPATIENT
Start: 2022-07-01

## 2022-07-01 NOTE — PROGRESS NOTES
INTERNAL MEDICINE OFFICE VISIT  [facility] AT [facility], [address]  Tel: [phone]      NAME: Radha Foster  AGE: 75 y o  SEX: male  : 1955   MRN: [id number]    DATE: 2022  TIME: 9:43 AM      Assessment and Plan:  1  Type 2 diabetes mellitus without complication, without long-term current use of insulin ([facility])    Continue present medications  - CBC and differential; Future  - Comprehensive metabolic panel; Future  - Lipid panel; Future  - TSH, 3rd generation; Future  - Hemoglobin A1C; Future    2  Essential hypertension   continue medications    3  Mixed hyperlipidemia   continue atorvastatin    4  Chronic obstructive pulmonary disease, unspecified COPD type ([facility] )   continue inhalers as advised  - albuterol (Ventolin HFA) 90 mcg/act inhaler; Inhale 2 puffs every 4 (four) hours as needed for wheezing  Dispense: 18 g; Refill: 5    5  Generalized anxiety disorder   continue BuSpar    6  Recurrent major depressive disorder, in partial remission ([facility] )   continue Prozac    7  Overweight  BMI Counseling: Body mass index is 26 13 kg/m²  The BMI is above normal  Nutrition recommendations include decreasing portion sizes, encouraging healthy choices of fruits and vegetables and moderation in carbohydrate intake  Rationale for BMI follow-up plan is due to patient being overweight or obese  8  Acute bronchitis, unspecified organism    - amoxicillin-clavulanate (AUGMENTIN) 875-125 mg per tablet; Take 1 tablet by mouth every 12 (twelve) hours for 7 days  Dispense: 14 tablet; Refill: 0  - predniSONE 10 mg tablet; Take 40 mg daily for 3 days, 30 mg daily for 3 days, 20 mg daily for 3 days, 10 mg daily for 3 days  Dispense: 30 tablet; Refill: 0  - benzonatate (TESSALON PERLES) 100 mg capsule; Take 1 capsule (100 mg total) by mouth 3 (three) times a day as needed for cough  Dispense: 30 capsule;  Refill: 0      - Counseling Documentation: patient was counseled regarding: diagnostic results, instructions for management, risk factor reductions, prognosis, patient and family education, risks and benefits of treatment options and importance of compliance with treatment  - Medication Side Effects: Adverse side effects of medications were reviewed with the patient/guardian today  Return for follow up visit in  4 months or earlier, if needed  Chief Complaint:  Chief Complaint   Patient presents with    Annual Exam     W labs          History of Present Illness:    type 2 diabetes is very well controlled  Blood pressure and cholesterol are stable,  Depression and anxiety are stable   He has been taking the Advair as well as the albuterol inhaler but has a lot of congestion, shortness of breath and wheezing as well as cough with greenish sputum production        Active Problem List:  Patient Active Problem List   Diagnosis    Generalized anxiety disorder    Cardiac arrhythmia    Chronic obstructive pulmonary disease (HCC)    Recurrent major depressive disorder, in partial remission ([facility] )    Diastolic dysfunction    Ebstein anomaly    Mixed hyperlipidemia    Essential hypertension    Hypertensive heart disease    Nonrheumatic pulmonary valve insufficiency    Overweight    Tricuspid valve disorders, non-rheumatic    Type 2 diabetes mellitus, without long-term current use of insulin (HCC)    Esophageal dysphagia         Past Medical History:  Past Medical History:   Diagnosis Date    Abnormal EKG     Alcohol abuse     Allergic rhinitis     last assessed: 6/4/2014    Anxiety     Asthma     last assesssed: 6/4/2014    Atresia of esophagus with tracheo-esophageal fistula     Benign neoplasm of colon     Campylobacter enteritis     last assessed: 5/6/2015    Cardiac arrhythmia     last assessed: 6/19/2015    Chronic cough     last assessed: 1/29/2015    Chronic fatigue syndrome     resolved: 6/4/2014    Chronic sinusitis     resolved: 6/4/2014    COPD (chronic obstructive pulmonary disease) ([facility])     Diabetes mellitus ([facility] )     Digestive symptom     EKG, abnormal     Epistaxis     Generalized osteoarthritis     resolved: 6/4/2014    GERD without esophagitis 5/14/2014    Heart disease     HTN (hypertension)     Hyperlipidemia     Myalgia     Myositis     Poisoning by drug or medicinal substance     Sleep apnea          Past Surgical History:  Past Surgical History:   Procedure Laterality Date    COLONOSCOPY      ESOPHAGOGASTRODUODENOSCOPY N/A 10/2/2018    Procedure: ESOPHAGOGASTRODUODENOSCOPY (EGD); Surgeon: Perla Cash MD;  Location: [facility];  Service: Gastroenterology    FOOT SURGERY      MN ESOPHAGOGASTRODUODENOSCOPY TRANSORAL DIAGNOSTIC N/A 11/8/2018    Procedure: ESOPHAGOGASTRODUODENOSCOPY (EGD); Surgeon: Dean Campbell MD;  Location: [facility];   Service: Gastroenterology    TONSILLECTOMY           Family History:  Family History   Problem Relation Age of Onset    Asthma Mother          Social History:  Social History     Socioeconomic History    Marital status: Single     Spouse name: None    Number of children: None    Years of education: None    Highest education level: None   Occupational History    None   Tobacco Use    Smoking status: Former Smoker     Packs/day: 0 00     Quit date: 10/2/2006     Years since quitting: 15 7    Smokeless tobacco: Never Used    Tobacco comment: cigars-social   Vaping Use    Vaping Use: Never used   Substance and Sexual Activity    Alcohol use: No     Comment: rarely    Drug use: No    Sexual activity: Yes     Partners: Female   Other Topics Concern    None   Social History Narrative    None     Social Determinants of Health     Financial Resource Strain: Not on file   Food Insecurity: Not on file   Transportation Needs: Not on file   Physical Activity: Not on file   Stress: Not on file   Social Connections: Not on file   Intimate Partner Violence: Not on file   Housing Stability: Not on file         Allergies:  No Known Allergies      Medications:    Current Outpatient Medications:     Advair Diskus 250-50 MCG/DOSE inhaler, TAKE 1 PUFF BY MOUTH TWICE A DAY, Disp: 60 blister, Rfl: 2    albuterol (Ventolin HFA) 90 mcg/act inhaler, Inhale 2 puffs every 4 (four) hours as needed for wheezing, Disp: 18 g, Rfl: 5    amoxicillin-clavulanate (AUGMENTIN) 875-125 mg per tablet, Take 1 tablet by mouth every 12 (twelve) hours for 7 days, Disp: 14 tablet, Rfl: 0    aspirin (ECOTRIN LOW STRENGTH) 81 mg EC tablet, Take 1 tablet by mouth daily, Disp: , Rfl:     atorvastatin (LIPITOR) 10 mg tablet, TAKE 1 TABLET BY MOUTH EVERY DAY, Disp: 90 tablet, Rfl: 2    benzonatate (TESSALON PERLES) 100 mg capsule, Take 1 capsule (100 mg total) by mouth 3 (three) times a day as needed for cough, Disp: 30 capsule, Rfl: 0    busPIRone (BUSPAR) 10 mg tablet, Take 10 mg by mouth 2 (two) times a day, Disp: , Rfl: 3    diltiazem (CARDIZEM CD) 180 mg 24 hr capsule, TAKE 1 CAPSULE BY MOUTH EVERY DAY, Disp: 90 capsule, Rfl: 0    FLUoxetine (PROzac) 40 MG capsule, Take 1 capsule by mouth daily, Disp: , Rfl:     ibuprofen (MOTRIN) 600 mg tablet, Take 600 mg by mouth every 6 (six) hours as needed, Disp: , Rfl: 0    lisinopril (ZESTRIL) 20 mg tablet, TAKE 1 TABLET BY MOUTH EVERY DAY, Disp: 90 tablet, Rfl: 3    metFORMIN (GLUCOPHAGE) 500 mg tablet, TAKE 1 TABLET BY MOUTH TWICE DAILY  , Disp: 180 tablet, Rfl: 2    ONETOUCH DELICA LANCETS FINE MISC, by Does not apply route, Disp: , Rfl:     OneTouch Ultra test strip, TESTING ONCE DAILY, Disp: 100 each, Rfl: 3    predniSONE 10 mg tablet, Take 40 mg daily for 3 days, 30 mg daily for 3 days, 20 mg daily for 3 days, 10 mg daily for 3 days, Disp: 30 tablet, Rfl: 0    temazepam (RESTORIL) 30 mg capsule, Take 1 capsule by mouth, Disp: , Rfl:     zolpidem (AMBIEN) 10 mg tablet, , Disp: , Rfl:     Blood Glucose Monitoring Suppl (ONE TOUCH ULTRA MINI) w/Device KIT, by Does not apply route daily for 30 days PT  TESTING ONCE DAILY DX:E11 9, Disp: 1 each, Rfl: 0      The following portions of the patient's history were reviewed and updated as appropriate: past medical history, past surgical history, family history, social history, allergies, current medications and active problem list       Review of Systems:  Constitutional: Denies fever, chills, weight gain, weight loss, fatigue  Eyes: Denies eye redness, eye discharge, double vision, change in visual acuity  ENT: Denies hearing loss, tinnitus, sneezing, nasal congestion, nasal discharge, sore throat   Respiratory:   Complains of cough, expectoration,  shortness of breath, wheezing  Cardiovascular: Denies chest pain, palpitations, lower extremity swelling, orthopnea, PND  Gastrointestinal: Denies abdominal pain, heartburn, nausea, vomiting, hematemesis, diarrhea, bloody stools  Genito-Urinary: Denies dysuria, frequency, difficulty in micturition, nocturia, incontinence  Musculoskeletal: Denies back pain, joint pain, muscle pain  Neurologic: Denies confusion, lightheadedness, syncope, headache, focal weakness, sensory changes, seizures  Endocrine: Denies polyuria, polydipsia, temperature intolerance  Allergy and Immunology: Denies hives, insect bite sensitivity  Hematological and Lymphatic: Denies bleeding problems, swollen glands   Psychological: Denies depression, suicidal ideation, anxiety, panic, mood swings  Dermatological: Denies pruritus, rash, skin lesion changes      Vitals:  Vitals:    07/01/22 0931   BP: 120/82   Pulse: 74   Resp: 16   Temp: (!) 97 4 °F (36 3 °C)   SpO2: 96%       Body mass index is 26 13 kg/m²  Weight (last 2 days)     Date/Time Weight    07/01/22 0931 71 2 (157)            Physical Examination:  General: Patient is not in acute distress  Awake, alert, responding to commands  No weight gain or loss  Head: Normocephalic   Atraumatic  Eyes: Conjunctiva and lids with no swelling, erythema or discharge  Both pupils normal sized, round and reactive to light  Sclera nonicteric  ENT: External examination of nose and ear normal  Otoscopic examination shows translucent tympanic membranes with patent canals without erythema  Oropharynx moist with no erythema, edema, exudate or lesions  Neck: Supple  JVP not raised  Trachea midline  No masses  No thyromegaly  Lungs: No signs of increased work of breathing or respiratory distress  Bilateral  rhonchi  Chest wall: No tenderness  Cardiovascular: Normal PMI  No thrills  Regular rate and rhythm  S1 and S2 normal  No murmur, rub or gallop  Gastrointestinal: Abdomen soft, nontender  No guarding or rigidity  Liver and spleen not palpable  Bowel sounds present  Neurologic: Cranial nerves II-XII intact  Cortical functions normal  Motor system - Reflexes 2+ and symmetrical  Sensations normal  Musculoskeletal: Gait normal  No joint tenderness  Integumentary: Skin normal with no rash or lesions  Lymphatic: No palpable lymph nodes in neck, axilla or groin  Extremities: No clubbing, cyanosis, edema or varicosities  Psychological: Judgement and insight normal  Mood and affect normal    Patient's shoes and socks removed  Right Foot/Ankle   Right Foot Inspection  Skin Exam: skin normal and skin intact  No dry skin, no warmth, no callus, no erythema, no maceration, no abnormal color, no pre-ulcer, no ulcer and no callus  Toe Exam: ROM and strength within normal limits  Sensory   Proprioception: diminished and intact  Monofilament testing: intact    Vascular  Capillary refills: < 3 seconds  The right DP pulse is 2+  The right PT pulse is 2+  Left Foot/Ankle  Left Foot Inspection  Skin Exam: skin normal and skin intact  No dry skin, no warmth, no erythema, no maceration, normal color, no pre-ulcer, no ulcer and no callus  Toe Exam: ROM and strength within normal limits       Sensory   Proprioception: intact  Monofilament testing: intact    Vascular  Capillary refills: < 3 seconds  The left DP pulse is 2+  The left PT pulse is 2+       Assign Risk Category  No deformity present  No loss of protective sensation  No weak pulses  Risk: 0    Laboratory Results:  CBC with diff:   Lab Results   Component Value Date    WBC 8 07 05/10/2022    WBC 8 33 10/19/2015    RBC 5 53 05/10/2022    RBC 5 46 10/19/2015    HGB 14 9 05/10/2022    HGB 14 8 10/19/2015    HCT 47 4 05/10/2022    HCT 44 9 10/19/2015    MCV 86 05/10/2022    MCV 82 10/19/2015    MCH 26 9 05/10/2022    MCH 27 1 10/19/2015    RDW 12 2 05/10/2022    RDW 13 0 10/19/2015     05/10/2022     10/19/2015       CMP:  Lab Results   Component Value Date    CREATININE 0 99 05/10/2022    CREATININE 0 85 10/19/2015    BUN 12 05/10/2022    BUN 10 10/19/2015     10/19/2015    K 3 8 05/10/2022    K 3 6 10/19/2015     05/10/2022     10/19/2015    CO2 28 05/10/2022    CO2 27 10/19/2015    GLUCOSE 142 (H) 10/19/2015    PROT 7 3 10/19/2015    ALKPHOS 80 05/10/2022    ALKPHOS 87 10/19/2015    ALT 16 05/10/2022    ALT 17 10/19/2015    AST 17 05/10/2022    AST 17 10/19/2015       Lab Results   Component Value Date    HGBA1C 6 3 (H) 05/10/2022    HGBA1C 6 7 (H) 10/19/2015       No results found for: TROPONINI, CKMB, CKTOTAL    Lipid Profile:   Lab Results   Component Value Date    CHOL 139 10/19/2015    CHOL 156 05/30/2015     Lab Results   Component Value Date    HDL 67 05/10/2022    HDL 67 11/19/2021     Lab Results   Component Value Date    LDLCALC 53 05/10/2022    LDLCALC 65 11/19/2021     Lab Results   Component Value Date    TRIG 205 (H) 05/10/2022    TRIG 181 (H) 11/19/2021       Imaging Results:       Health Maintenance:  Health Maintenance   Topic Date Due    DTaP,Tdap,and Td Vaccines (1 - Tdap) 05/05/1975    COVID-19 Vaccine (3 - Booster for Moderna series) 08/13/2021    BMI: Followup Plan  05/21/2022    Diabetic Foot Exam  05/21/2022    Influenza Vaccine (1) 09/01/2022    BMI: Adult  12/01/2022    Pneumococcal Vaccine: 65+ Years (3 - PPSV23 or PCV20) 07/30/2022    HEMOGLOBIN A1C  11/10/2022    Fall Risk  12/01/2022    Medicare Annual Wellness Visit (AWV)  12/01/2022    Depression Remission PHQ  12/01/2022    DM Eye Exam  05/21/2023    Colorectal Cancer Screening  06/10/2025    Hepatitis C Screening  Completed    HIB Vaccine  Aged Out    Hepatitis B Vaccine  Aged Out    IPV Vaccine  Aged Out    Hepatitis A Vaccine  Aged Out    Meningococcal ACWY Vaccine  Aged Out    HPV Vaccine  Aged Out     Immunization History   Administered Date(s) Administered    COVID-19 MODERNA VACC 0 5 ML IM 02/13/2021, 03/13/2021    INFLUENZA 10/28/2016, 10/24/2018    Influenza Quadrivalent, 6-35 Months IM 10/06/2014, 10/28/2016, 09/08/2017    Influenza, high dose seasonal 0 7 mL 09/16/2019, 10/17/2020    Influenza, recombinant, quadrivalent,injectable, preservative free 10/24/2018    Influenza, seasonal, injectable 10/21/2013, 10/06/2015    Pneumococcal Conjugate 13-Valent 07/30/2021    Pneumococcal Polysaccharide PPV23 06/04/2014, 02/27/2017    Tdap 1955    Zoster 01/01/2014    Zoster Vaccine Recombinant 01/01/2014         Tim Elias MD  7/1/2022,9:43 AM

## 2022-07-06 DIAGNOSIS — J44.9 COPD MIXED TYPE (HCC): ICD-10-CM

## 2022-07-06 DIAGNOSIS — E11.9 TYPE 2 DIABETES MELLITUS WITHOUT COMPLICATION, WITHOUT LONG-TERM CURRENT USE OF INSULIN (HCC): ICD-10-CM

## 2022-07-06 DIAGNOSIS — I10 ESSENTIAL HYPERTENSION: ICD-10-CM

## 2022-07-06 DIAGNOSIS — E78.2 MIXED HYPERLIPIDEMIA: ICD-10-CM

## 2022-07-06 RX ORDER — ATORVASTATIN CALCIUM 10 MG/1
TABLET, FILM COATED ORAL
Qty: 90 TABLET | Refills: 2 | Status: SHIPPED | OUTPATIENT
Start: 2022-07-06

## 2022-07-06 RX ORDER — DILTIAZEM HYDROCHLORIDE 180 MG/1
CAPSULE, COATED, EXTENDED RELEASE ORAL
Qty: 90 CAPSULE | Refills: 0 | Status: SHIPPED | OUTPATIENT
Start: 2022-07-06 | End: 2022-08-01

## 2022-07-06 RX ORDER — FLUTICASONE PROPIONATE AND SALMETEROL 250; 50 UG/1; UG/1
POWDER RESPIRATORY (INHALATION)
Qty: 60 BLISTER | Refills: 2 | Status: SHIPPED | OUTPATIENT
Start: 2022-07-06 | End: 2022-07-14 | Stop reason: SDUPTHER

## 2022-07-06 NOTE — TELEPHONE ENCOUNTER
Medication Refill Request     Name advair     Dose/Frequency 1 puff twice daily   Quantity 60 blisters with 2 refills   Verified pharmacy   [x]  Verified ordering Provider   [x]  Verified enough for 3 days  []    Patient only has enough until tomorrow

## 2022-07-08 ENCOUNTER — TELEPHONE (OUTPATIENT)
Dept: INTERNAL MEDICINE CLINIC | Facility: CLINIC | Age: 67
End: 2022-07-08

## 2022-07-08 NOTE — TELEPHONE ENCOUNTER
Patient's pharmacy ([address]) is stating that they did not receive the refill request :  Name advair       Dose/Frequency 1 puff twice daily   Quantity 60 blisters with 2 refills   Verified pharmacy   [x]? Verified ordering Provider   [x]?   Verified enough for 3 days  []?     Patient is now out of medication

## 2022-07-14 ENCOUNTER — NURSE TRIAGE (OUTPATIENT)
Dept: OTHER | Facility: OTHER | Age: 67
End: 2022-07-14

## 2022-07-14 DIAGNOSIS — J44.9 COPD MIXED TYPE (HCC): ICD-10-CM

## 2022-07-14 RX ORDER — FLUTICASONE PROPIONATE AND SALMETEROL 250; 50 UG/1; UG/1
POWDER RESPIRATORY (INHALATION)
Qty: 60 BLISTER | Refills: 0 | Status: SHIPPED | OUTPATIENT
Start: 2022-07-14 | End: 2022-07-14

## 2022-07-14 RX ORDER — FLUTICASONE PROPIONATE AND SALMETEROL 250; 50 UG/1; UG/1
POWDER RESPIRATORY (INHALATION)
Qty: 60 BLISTER | Refills: 0 | Status: SHIPPED | OUTPATIENT
Start: 2022-07-14 | End: 2022-07-14 | Stop reason: SDUPTHER

## 2022-07-14 RX ORDER — FLUTICASONE PROPIONATE AND SALMETEROL 250; 50 UG/1; UG/1
POWDER RESPIRATORY (INHALATION)
Qty: 60 BLISTER | Refills: 0 | Status: SHIPPED | OUTPATIENT
Start: 2022-07-14 | End: 2022-08-03

## 2022-07-14 NOTE — TELEPHONE ENCOUNTER
Patient calling in stating that he is out of his Advair and the pharmacy is stating they did not receive the order  Spoke with pharmacist at [facility] who states they need further administration instructions on the medication in order to fill it  Please adjust and resend to pharmacy for patient

## 2022-07-14 NOTE — TELEPHONE ENCOUNTER
Regarding: Med Refill - Advair  ----- Message from Nahed Oneil sent at 7/14/2022  7:43 PM EDT -----  "I was supposed to get a refill of my Advair, but my pharmacy says they don't have it, even though it says they sent it "

## 2022-07-14 NOTE — TELEPHONE ENCOUNTER
Regarding: Needs Medication / Advair Diskus  ----- Message from Nuzhat Samples sent at 7/14/2022  2:14 PM EDT -----  "I have been trying to get Fluticasone-Salmeterol (Advair Diskus) 250-50 mcg/dose inhaler  I have COPD and just getting over Bronchitis and I need it  I've have it for 15 year now   I haven't had it since last week and I've called twice for it"

## 2022-07-14 NOTE — TELEPHONE ENCOUNTER
Patient calling in stating that he was suppose to get his Advair last week for an ongoing cough from his bronchitis  The prescription was sent to the pharmacy on 7/6  Spoke with pharmacist at [facility] who states in order to fill the medication they need specific administration instruction  Medication pended again and forwarded to provider for specific instructions  Reason for Disposition   Caller has NON-URGENT medicine question about med that PCP or specialist prescribed and triager unable to answer question    Answer Assessment - Initial Assessment Questions  1  NAME of MEDICATION: "What medicine are you calling about?"      Advair Diskus     2  QUESTION: "What is your question?" (e g , medication refill, side effect)       " I didn't receive my medication and I am still having a cough "     3  PRESCRIBING HCP: "Who prescribed it?" Reason: if prescribed by specialist, call should be referred to that group  PCP     4  SYMPTOMS: "Do you have any symptoms?"      Just getting over bronchitis and has residual cough     5   SEVERITY: If symptoms are present, ask "Are they mild, moderate or severe?"      Mild    Protocols used: MEDICATION QUESTION CALL-ADULT-[place]

## 2022-07-14 NOTE — TELEPHONE ENCOUNTER
Reason for Disposition   [1] Caller has NON-URGENT medicine question about med that PCP prescribed AND [2] triager unable to answer question    Answer Assessment - Initial Assessment Questions  1  NAME of MEDICATION: "What medicine are you calling about?"      Advair     2  QUESTION: "What is your question?" (e g , medication refill, side effect)     Not at pharmacy     3  PRESCRIBING HCP: "Who prescribed it?" Reason: if prescribed by specialist, call should be referred to that group  Dr To Becerra    4  SYMPTOMS: "Do you have any symptoms?"     Denies    5   SEVERITY: If symptoms are present, ask "Are they mild, moderate or severe?"    N/A    Protocols used: MEDICATION QUESTION CALL-ADULT-

## 2022-08-01 DIAGNOSIS — I10 ESSENTIAL HYPERTENSION: ICD-10-CM

## 2022-08-01 RX ORDER — DILTIAZEM HYDROCHLORIDE 180 MG/1
CAPSULE, COATED, EXTENDED RELEASE ORAL
Qty: 90 CAPSULE | Refills: 0 | Status: SHIPPED | OUTPATIENT
Start: 2022-08-01

## 2022-08-03 DIAGNOSIS — J44.9 COPD MIXED TYPE (HCC): ICD-10-CM

## 2022-08-03 RX ORDER — FLUTICASONE PROPIONATE AND SALMETEROL 250; 50 UG/1; UG/1
POWDER RESPIRATORY (INHALATION)
Qty: 60 BLISTER | Refills: 0 | Status: SHIPPED | OUTPATIENT
Start: 2022-08-03 | End: 2022-09-13 | Stop reason: SDUPTHER

## 2022-10-10 DIAGNOSIS — J44.9 COPD MIXED TYPE (HCC): ICD-10-CM

## 2022-10-10 RX ORDER — FLUTICASONE PROPIONATE AND SALMETEROL 250; 50 UG/1; UG/1
POWDER RESPIRATORY (INHALATION)
Qty: 60 BLISTER | Refills: 0 | Status: SHIPPED | OUTPATIENT
Start: 2022-10-10

## 2022-10-25 ENCOUNTER — APPOINTMENT (OUTPATIENT)
Dept: LAB | Facility: CLINIC | Age: 67
End: 2022-10-25
Payer: MEDICARE

## 2022-10-25 DIAGNOSIS — E11.9 TYPE 2 DIABETES MELLITUS WITHOUT COMPLICATION, WITHOUT LONG-TERM CURRENT USE OF INSULIN (HCC): ICD-10-CM

## 2022-10-25 LAB
ALBUMIN SERPL BCP-MCNC: 3.2 G/DL (ref 3.5–5)
ALP SERPL-CCNC: 81 U/L (ref 46–116)
ALT SERPL W P-5'-P-CCNC: 18 U/L (ref 12–78)
ANION GAP SERPL CALCULATED.3IONS-SCNC: 5 MMOL/L (ref 4–13)
AST SERPL W P-5'-P-CCNC: 20 U/L (ref 5–45)
BASOPHILS # BLD AUTO: 0.04 THOUSANDS/ÂΜL (ref 0–0.1)
BASOPHILS NFR BLD AUTO: 1 % (ref 0–1)
BILIRUB SERPL-MCNC: 0.69 MG/DL (ref 0.2–1)
BUN SERPL-MCNC: 13 MG/DL (ref 5–25)
CALCIUM ALBUM COR SERPL-MCNC: 9.9 MG/DL (ref 8.3–10.1)
CALCIUM SERPL-MCNC: 9.3 MG/DL (ref 8.3–10.1)
CHLORIDE SERPL-SCNC: 103 MMOL/L (ref 96–108)
CHOLEST SERPL-MCNC: 176 MG/DL
CO2 SERPL-SCNC: 27 MMOL/L (ref 21–32)
CREAT SERPL-MCNC: 0.92 MG/DL (ref 0.6–1.3)
EOSINOPHIL # BLD AUTO: 0.29 THOUSAND/ÂΜL (ref 0–0.61)
EOSINOPHIL NFR BLD AUTO: 4 % (ref 0–6)
ERYTHROCYTE [DISTWIDTH] IN BLOOD BY AUTOMATED COUNT: 12.1 % (ref 11.6–15.1)
EST. AVERAGE GLUCOSE BLD GHB EST-MCNC: 128 MG/DL
GFR SERPL CREATININE-BSD FRML MDRD: 85 ML/MIN/1.73SQ M
GLUCOSE P FAST SERPL-MCNC: 125 MG/DL (ref 65–99)
HBA1C MFR BLD: 6.1 %
HCT VFR BLD AUTO: 45 % (ref 36.5–49.3)
HDLC SERPL-MCNC: 63 MG/DL
HGB BLD-MCNC: 14.3 G/DL (ref 12–17)
IMM GRANULOCYTES # BLD AUTO: 0.02 THOUSAND/UL (ref 0–0.2)
IMM GRANULOCYTES NFR BLD AUTO: 0 % (ref 0–2)
LDLC SERPL CALC-MCNC: 68 MG/DL (ref 0–100)
LYMPHOCYTES # BLD AUTO: 2.78 THOUSANDS/ÂΜL (ref 0.6–4.47)
LYMPHOCYTES NFR BLD AUTO: 34 % (ref 14–44)
MCH RBC QN AUTO: 27.6 PG (ref 26.8–34.3)
MCHC RBC AUTO-ENTMCNC: 31.8 G/DL (ref 31.4–37.4)
MCV RBC AUTO: 87 FL (ref 82–98)
MONOCYTES # BLD AUTO: 0.76 THOUSAND/ÂΜL (ref 0.17–1.22)
MONOCYTES NFR BLD AUTO: 9 % (ref 4–12)
NEUTROPHILS # BLD AUTO: 4.2 THOUSANDS/ÂΜL (ref 1.85–7.62)
NEUTS SEG NFR BLD AUTO: 52 % (ref 43–75)
NONHDLC SERPL-MCNC: 113 MG/DL
NRBC BLD AUTO-RTO: 0 /100 WBCS
PLATELET # BLD AUTO: 270 THOUSANDS/UL (ref 149–390)
PMV BLD AUTO: 10.9 FL (ref 8.9–12.7)
POTASSIUM SERPL-SCNC: 3.7 MMOL/L (ref 3.5–5.3)
PROT SERPL-MCNC: 7.2 G/DL (ref 6.4–8.4)
RBC # BLD AUTO: 5.18 MILLION/UL (ref 3.88–5.62)
SODIUM SERPL-SCNC: 135 MMOL/L (ref 135–147)
TRIGL SERPL-MCNC: 224 MG/DL
TSH SERPL DL<=0.05 MIU/L-ACNC: 1.71 UIU/ML (ref 0.45–4.5)
WBC # BLD AUTO: 8.09 THOUSAND/UL (ref 4.31–10.16)

## 2022-10-25 PROCEDURE — 80061 LIPID PANEL: CPT

## 2022-10-25 PROCEDURE — 36415 COLL VENOUS BLD VENIPUNCTURE: CPT

## 2022-10-25 PROCEDURE — 83036 HEMOGLOBIN GLYCOSYLATED A1C: CPT

## 2022-10-25 PROCEDURE — 84443 ASSAY THYROID STIM HORMONE: CPT

## 2022-10-25 PROCEDURE — 85025 COMPLETE CBC W/AUTO DIFF WBC: CPT

## 2022-10-25 PROCEDURE — 80053 COMPREHEN METABOLIC PANEL: CPT

## 2022-11-01 ENCOUNTER — OFFICE VISIT (OUTPATIENT)
Dept: INTERNAL MEDICINE CLINIC | Facility: CLINIC | Age: 67
End: 2022-11-01

## 2022-11-01 VITALS
DIASTOLIC BLOOD PRESSURE: 92 MMHG | BODY MASS INDEX: 26.29 KG/M2 | TEMPERATURE: 97.5 F | OXYGEN SATURATION: 90 % | HEIGHT: 65 IN | HEART RATE: 92 BPM | WEIGHT: 157.8 LBS | SYSTOLIC BLOOD PRESSURE: 136 MMHG | RESPIRATION RATE: 18 BRPM

## 2022-11-01 DIAGNOSIS — Z12.5 SCREENING FOR MALIGNANT NEOPLASM OF PROSTATE: ICD-10-CM

## 2022-11-01 DIAGNOSIS — E78.2 MIXED HYPERLIPIDEMIA: ICD-10-CM

## 2022-11-01 DIAGNOSIS — F41.1 GENERALIZED ANXIETY DISORDER: ICD-10-CM

## 2022-11-01 DIAGNOSIS — J44.9 CHRONIC OBSTRUCTIVE PULMONARY DISEASE, UNSPECIFIED COPD TYPE (HCC): ICD-10-CM

## 2022-11-01 DIAGNOSIS — F33.41 RECURRENT MAJOR DEPRESSIVE DISORDER, IN PARTIAL REMISSION (HCC): ICD-10-CM

## 2022-11-01 DIAGNOSIS — I10 ESSENTIAL HYPERTENSION: ICD-10-CM

## 2022-11-01 DIAGNOSIS — Z23 ENCOUNTER FOR IMMUNIZATION: ICD-10-CM

## 2022-11-01 DIAGNOSIS — E11.9 TYPE 2 DIABETES MELLITUS WITHOUT COMPLICATION, WITHOUT LONG-TERM CURRENT USE OF INSULIN (HCC): Primary | ICD-10-CM

## 2022-11-01 NOTE — PROGRESS NOTES
INTERNAL MEDICINE OFFICE VISIT  [facility]  [address], [address]  Tel: [phone]      NAME: Tiny Bella  AGE: 75 y o  SEX: male  : 1955   MRN: [id number]    DATE: 2022  TIME: 9:28 AM      Assessment and Plan:  1  Type 2 diabetes mellitus without complication, without long-term current use of insulin ([facility])   continue the same dose of metformin, will recheck labs in 4 months  Told to continue low-carbohydrate diet  - CBC and differential; Future  - Comprehensive metabolic panel; Future  - Lipid panel; Future  - TSH, 3rd generation; Future  - Hemoglobin A1C; Future  - Microalbumin / creatinine urine ratio    2  Essential hypertension   continue medications    3  Mixed hyperlipidemia   continue atorvastatin    4  Chronic obstructive pulmonary disease, unspecified COPD type ([facility] )   continue inhalers regularly    5  Generalized anxiety disorder   continue BuSpar, anxiety is much better controlled now    6  Recurrent major depressive disorder, in partial remission ([facility] )   continue Prozac    7  Encounter for immunization    - Pneumococcal Conjugate Vaccine 20-valent (PCV20)      - Counseling Documentation: patient was counseled regarding: diagnostic results, instructions for management, risk factor reductions, prognosis, patient and family education, risks and benefits of treatment options and importance of compliance with treatment  - Medication Side Effects: Adverse side effects of medications were reviewed with the patient/guardian today  Return for follow up visit in  4 months or earlier, if needed  Chief Complaint:  Chief Complaint   Patient presents with   • Follow-up     4 month w labs          History of Present Illness:    type 2 diabetes is fairly well controlled  Blood pressure and cholesterol are stable   COPD is stable on the inhalers and he takes the Advair regularly      Anxiety and depression are controlled on medication if he remains busy with work  He does get anxious when he is not doing anything  Active Problem List:  Patient Active Problem List   Diagnosis   • Generalized anxiety disorder   • Cardiac arrhythmia   • Chronic obstructive pulmonary disease (HCC)   • Recurrent major depressive disorder, in partial remission (HCC)   • Diastolic dysfunction   • Ebstein anomaly   • Mixed hyperlipidemia   • Essential hypertension   • Hypertensive heart disease   • Nonrheumatic pulmonary valve insufficiency   • Overweight   • Tricuspid valve disorders, non-rheumatic   • Type 2 diabetes mellitus, without long-term current use of insulin ([facility] )   • Esophageal dysphagia         Past Medical History:  Past Medical History:   Diagnosis Date   • Abnormal EKG    • Alcohol abuse    • Allergic rhinitis     last assessed: 6/4/2014   • Anxiety    • Asthma     last assesssed: 6/4/2014   • Atresia of esophagus with tracheo-esophageal fistula    • Benign neoplasm of colon    • Campylobacter enteritis     last assessed: 5/6/2015   • Cardiac arrhythmia     last assessed: 6/19/2015   • Chronic cough     last assessed: 1/29/2015   • Chronic fatigue syndrome     resolved: 6/4/2014   • Chronic sinusitis     resolved: 6/4/2014   • COPD (chronic obstructive pulmonary disease) ([facility] )    • Diabetes mellitus ([facility] )    • Digestive symptom    • EKG, abnormal    • Epistaxis    • Generalized osteoarthritis     resolved: 6/4/2014   • GERD without esophagitis 5/14/2014   • Heart disease    • HTN (hypertension)    • Hyperlipidemia    • Myalgia    • Myositis    • Poisoning by drug or medicinal substance    • Sleep apnea          Past Surgical History:  Past Surgical History:   Procedure Laterality Date   • COLONOSCOPY     • ESOPHAGOGASTRODUODENOSCOPY N/A 10/2/2018    Procedure: ESOPHAGOGASTRODUODENOSCOPY (EGD);   Surgeon: Emili Ramírez MD;  Location: [facility];  Service: Gastroenterology   • FOOT SURGERY     • IA ESOPHAGOGASTRODUODENOSCOPY TRANSORAL DIAGNOSTIC N/A 2018    Procedure: ESOPHAGOGASTRODUODENOSCOPY (EGD); Surgeon: Pascual Andrews MD;  Location: [facility];   Service: Gastroenterology   • TONSILLECTOMY           Family History:  Family History   Problem Relation Age of Onset   • Asthma Mother          Social History:  Social History     Socioeconomic History   • Marital status: Single     Spouse name: None   • Number of children: None   • Years of education: None   • Highest education level: None   Occupational History   • None   Tobacco Use   • Smoking status: Former Smoker     Packs/day: 0 00     Quit date: 10/2/2006     Years since quittin 0   • Smokeless tobacco: Never Used   • Tobacco comment: cigars-social   Vaping Use   • Vaping Use: Never used   Substance and Sexual Activity   • Alcohol use: No     Comment: rarely   • Drug use: No   • Sexual activity: Yes     Partners: Female   Other Topics Concern   • None   Social History Narrative   • None     Social Determinants of Health     Financial Resource Strain: Not on file   Food Insecurity: Not on file   Transportation Needs: Not on file   Physical Activity: Not on file   Stress: Not on file   Social Connections: Not on file   Intimate Partner Violence: Not on file   Housing Stability: Not on file         Allergies:  No Known Allergies      Medications:    Current Outpatient Medications:   •  albuterol (Ventolin HFA) 90 mcg/act inhaler, Inhale 2 puffs every 4 (four) hours as needed for wheezing, Disp: 18 g, Rfl: 5  •  aspirin (ECOTRIN LOW STRENGTH) 81 mg EC tablet, Take 1 tablet by mouth daily, Disp: , Rfl:   •  atorvastatin (LIPITOR) 10 mg tablet, TAKE 1 TABLET BY MOUTH EVERY DAY, Disp: 90 tablet, Rfl: 2  •  busPIRone (BUSPAR) 10 mg tablet, Take 10 mg by mouth 2 (two) times a day, Disp: , Rfl: 3  •  diltiazem (CARDIZEM CD) 180 mg 24 hr capsule, TAKE 1 CAPSULE BY MOUTH EVERY DAY, Disp: 90 capsule, Rfl: 0  •  FLUoxetine (PROzac) 40 MG capsule, Take 1 capsule by mouth daily, Disp: , Rfl:   •  Fluticasone-Salmeterol (Advair) 250-50 mcg/dose inhaler, INHALE 1 PUFF BY MOUTH TWICE A DAY *RINSE MOUTH AFTER USE, Disp: 60 blister, Rfl: 0  •  ibuprofen (MOTRIN) 600 mg tablet, Take 600 mg by mouth every 6 (six) hours as needed, Disp: , Rfl: 0  •  lisinopril (ZESTRIL) 20 mg tablet, TAKE 1 TABLET BY MOUTH EVERY DAY, Disp: 90 tablet, Rfl: 3  •  metFORMIN (GLUCOPHAGE) 500 mg tablet, TAKE 1 TABLET BY MOUTH TWICE A DAY, Disp: 180 tablet, Rfl: 2  •  ONETOUCH DELICA LANCETS FINE MISC, by Does not apply route, Disp: , Rfl:   •  OneTouch Ultra test strip, TESTING ONCE DAILY, Disp: 100 each, Rfl: 3  •  temazepam (RESTORIL) 30 mg capsule, Take 1 capsule by mouth, Disp: , Rfl:   •  zolpidem (AMBIEN) 10 mg tablet, , Disp: , Rfl:   •  Blood Glucose Monitoring Suppl (ONE TOUCH ULTRA MINI) w/Device KIT, by Does not apply route daily for 30 days PT  TESTING ONCE DAILY DX:E11 [id number], Disp: 1 each, Rfl: 0      The following portions of the patient's history were reviewed and updated as appropriate: past medical history, past surgical history, family history, social history, allergies, current medications and active problem list       Review of Systems:  Constitutional: Denies fever, chills, weight gain, weight loss, fatigue  Eyes: Denies eye redness, eye discharge, double vision, change in visual acuity  ENT: Denies hearing loss, tinnitus, sneezing, nasal congestion, nasal discharge, sore throat   Respiratory: Denies cough, expectoration, hemoptysis, shortness of breath, wheezing  Cardiovascular: Denies chest pain, palpitations, lower extremity swelling, orthopnea, PND  Gastrointestinal: Denies abdominal pain, heartburn, nausea, vomiting, hematemesis, diarrhea, bloody stools  Genito-Urinary: Denies dysuria, frequency, difficulty in micturition, nocturia, incontinence  Musculoskeletal: Denies back pain, joint pain, muscle pain  Neurologic: Denies confusion, lightheadedness, syncope, headache, focal weakness, sensory changes, seizures  Endocrine: Denies polyuria, polydipsia, temperature intolerance  Allergy and Immunology: Denies hives, insect bite sensitivity  Hematological and Lymphatic: Denies bleeding problems, swollen glands   Psychological:  Complains of anxiety and depression,  Dermatological: Denies pruritus, rash, skin lesion changes      Vitals:  Vitals:    11/01/22 0857   BP: 136/92   Pulse: 92   Resp: 18   Temp: 97 5 °F (36 4 °C)   SpO2: 90%       Body mass index is 26 26 kg/m²  Weight (last 2 days)     Date/Time Weight    11/01/22 0857 71 6 (157 8)            Physical Examination:  General: Patient is not in acute distress  Awake, alert, responding to commands  No weight gain or loss  Head: Normocephalic  Atraumatic  Eyes: Conjunctiva and lids with no swelling, erythema or discharge  Both pupils normal sized, round and reactive to light  Sclera nonicteric  ENT: External examination of nose and ear normal  Otoscopic examination shows translucent tympanic membranes with patent canals without erythema  Oropharynx moist with no erythema, edema, exudate or lesions  Neck: Supple  JVP not raised  Trachea midline  No masses  No thyromegaly  Lungs: No signs of increased work of breathing or respiratory distress  Bilateral bronchovascular breath sounds with no crackles or rhonchi  Chest wall: No tenderness  Cardiovascular: Normal PMI  No thrills  Regular rate and rhythm  S1 and S2 normal  No murmur, rub or gallop  Gastrointestinal: Abdomen soft, nontender  No guarding or rigidity  Liver and spleen not palpable  Bowel sounds present  Neurologic: Cranial nerves II-XII intact   Cortical functions normal  Motor system - Reflexes 2+ and symmetrical  Sensations normal  Musculoskeletal: Gait normal  No joint tenderness  Integumentary: Skin normal with no rash or lesions  Lymphatic: No palpable lymph nodes in neck, axilla or groin  Extremities: No clubbing, cyanosis, edema or varicosities  Psychological: Judgement and insight normal   Seems anxious      Laboratory Results:  CBC with diff:   Lab Results   Component Value Date    WBC 8 09 10/25/2022    WBC 8 33 10/19/2015    RBC 5 18 10/25/2022    RBC 5 46 10/19/2015    HGB 14 3 10/25/2022    HGB 14 8 10/19/2015    HCT 45 0 10/25/2022    HCT 44 9 10/19/2015    MCV 87 10/25/2022    MCV 82 10/19/2015    MCH 27 6 10/25/2022    MCH 27 1 10/19/2015    RDW 12 1 10/25/2022    RDW 13 0 10/19/2015     10/25/2022     10/19/2015       CMP:  Lab Results   Component Value Date    CREATININE 0 92 10/25/2022    CREATININE 0 85 10/19/2015    BUN 13 10/25/2022    BUN 10 10/19/2015     10/19/2015    K 3 7 10/25/2022    K 3 6 10/19/2015     10/25/2022     10/19/2015    CO2 27 10/25/2022    CO2 27 10/19/2015    GLUCOSE 142 (H) 10/19/2015    PROT 7 3 10/19/2015    ALKPHOS 81 10/25/2022    ALKPHOS 87 10/19/2015    ALT 18 10/25/2022    ALT 17 10/19/2015    AST 20 10/25/2022    AST 17 10/19/2015       Lab Results   Component Value Date    HGBA1C 6 1 (H) 10/25/2022    HGBA1C 6 7 (H) 10/19/2015       No results found for: TROPONINI, CKMB, CKTOTAL    Lipid Profile:   Lab Results   Component Value Date    CHOL 139 10/19/2015    CHOL 156 05/30/2015     Lab Results   Component Value Date    HDL 63 10/25/2022    HDL 67 05/10/2022     Lab Results   Component Value Date    LDLCALC 68 10/25/2022    LDLCALC 53 05/10/2022     Lab Results   Component Value Date    TRIG 224 (H) 10/25/2022    TRIG 205 (H) 05/10/2022       Imaging Results:       Health Maintenance:  Health Maintenance   Topic Date Due   • COVID-19 Vaccine (3 - Booster for Moderna series) 08/13/2021   • Pneumococcal Vaccine: 65+ Years (3 - PPSV23 or PCV20) 07/30/2022   • Influenza Vaccine (1) 09/01/2022   • Fall Risk  12/01/2022   • Medicare Annual Wellness Visit (AWV)  12/01/2022   • Depression Remission PHQ  12/01/2022   • HEMOGLOBIN A1C  04/25/2023   • DM Eye Exam  05/21/2023   • BMI: Followup Plan  07/01/2023   • BMI: Adult 07/01/2023   • Diabetic Foot Exam  07/01/2023   • Colorectal Cancer Screening  06/10/2025   • Hepatitis C Screening  Completed   • HIB Vaccine  Aged Out   • Hepatitis B Vaccine  Aged Out   • IPV Vaccine  Aged Out   • Hepatitis A Vaccine  Aged Out   • Meningococcal ACWY Vaccine  Aged Out   • HPV Vaccine  Aged Out     Immunization History   Administered Date(s) Administered   • COVID-19 MODERNA VACC 0 5 ML IM 02/13/2021, 03/13/2021   • INFLUENZA 10/28/2016, 10/24/2018   • Influenza Quadrivalent, 6-35 Months IM 10/06/2014, 10/28/2016, 09/08/2017   • Influenza, high dose seasonal 0 7 mL 09/16/2019, 10/17/2020   • Influenza, recombinant, quadrivalent,injectable, preservative free 10/24/2018   • Influenza, seasonal, injectable 10/21/2013, 10/06/2015   • Pneumococcal Conjugate 13-Valent 07/30/2021   • Pneumococcal Polysaccharide PPV23 06/04/2014, 02/27/2017   • Tdap 1955   • Zoster 01/01/2014   • Zoster Vaccine Recombinant 01/01/2014         Ej Go MD  11/1/2022,9:28 AM

## 2022-11-14 ENCOUNTER — TELEPHONE (OUTPATIENT)
Dept: INTERNAL MEDICINE CLINIC | Facility: CLINIC | Age: 67
End: 2022-11-14

## 2022-11-14 DIAGNOSIS — J44.9 COPD MIXED TYPE (HCC): ICD-10-CM

## 2022-11-14 RX ORDER — FLUTICASONE PROPIONATE AND SALMETEROL 250; 50 UG/1; UG/1
POWDER RESPIRATORY (INHALATION)
Qty: 60 BLISTER | Refills: 0 | Status: SHIPPED | OUTPATIENT
Start: 2022-11-14

## 2022-11-14 NOTE — TELEPHONE ENCOUNTER
Needs refill on:  Fluticasone-Salmeterol (Advair) 250-50 mcg/dose inhaler  Medication       [facility]

## 2022-12-09 DIAGNOSIS — I10 ESSENTIAL HYPERTENSION: ICD-10-CM

## 2022-12-09 DIAGNOSIS — J44.9 COPD MIXED TYPE (HCC): ICD-10-CM

## 2022-12-09 RX ORDER — FLUTICASONE PROPIONATE AND SALMETEROL 50; 250 UG/1; UG/1
POWDER RESPIRATORY (INHALATION)
Qty: 14 BLISTER | Refills: 0 | Status: SHIPPED | OUTPATIENT
Start: 2022-12-09

## 2022-12-09 RX ORDER — DILTIAZEM HYDROCHLORIDE 180 MG/1
CAPSULE, COATED, EXTENDED RELEASE ORAL
Qty: 90 CAPSULE | Refills: 0 | Status: SHIPPED | OUTPATIENT
Start: 2022-12-09

## 2022-12-09 RX ORDER — LISINOPRIL 20 MG/1
TABLET ORAL
Qty: 90 TABLET | Refills: 3 | Status: SHIPPED | OUTPATIENT
Start: 2022-12-09

## 2022-12-19 DIAGNOSIS — J44.9 COPD MIXED TYPE (HCC): ICD-10-CM

## 2022-12-19 DIAGNOSIS — I10 ESSENTIAL HYPERTENSION: ICD-10-CM

## 2022-12-19 RX ORDER — DILTIAZEM HYDROCHLORIDE 180 MG/1
CAPSULE, COATED, EXTENDED RELEASE ORAL
Qty: 90 CAPSULE | Refills: 0 | Status: SHIPPED | OUTPATIENT
Start: 2022-12-19

## 2022-12-20 RX ORDER — FLUTICASONE PROPIONATE AND SALMETEROL 250; 50 UG/1; UG/1
POWDER RESPIRATORY (INHALATION)
Qty: 60 BLISTER | Refills: 3 | Status: SHIPPED | OUTPATIENT
Start: 2022-12-20

## 2023-02-04 DIAGNOSIS — E78.2 MIXED HYPERLIPIDEMIA: ICD-10-CM

## 2023-02-04 DIAGNOSIS — E11.9 TYPE 2 DIABETES MELLITUS WITHOUT COMPLICATION, WITHOUT LONG-TERM CURRENT USE OF INSULIN (HCC): ICD-10-CM

## 2023-02-04 RX ORDER — ATORVASTATIN CALCIUM 10 MG/1
TABLET, FILM COATED ORAL
Qty: 90 TABLET | Refills: 2 | Status: SHIPPED | OUTPATIENT
Start: 2023-02-04

## 2023-02-16 ENCOUNTER — APPOINTMENT (OUTPATIENT)
Dept: LAB | Facility: CLINIC | Age: 68
End: 2023-02-16

## 2023-02-16 DIAGNOSIS — E11.9 TYPE 2 DIABETES MELLITUS WITHOUT COMPLICATION, WITHOUT LONG-TERM CURRENT USE OF INSULIN (HCC): ICD-10-CM

## 2023-02-16 DIAGNOSIS — Z12.5 SCREENING FOR MALIGNANT NEOPLASM OF PROSTATE: ICD-10-CM

## 2023-02-16 LAB
ALBUMIN SERPL BCP-MCNC: 3.7 G/DL (ref 3.5–5)
ALP SERPL-CCNC: 79 U/L (ref 46–116)
ALT SERPL W P-5'-P-CCNC: 13 U/L (ref 12–78)
ANION GAP SERPL CALCULATED.3IONS-SCNC: 6 MMOL/L (ref 4–13)
AST SERPL W P-5'-P-CCNC: 15 U/L (ref 5–45)
BASOPHILS # BLD AUTO: 0.05 THOUSANDS/ÂΜL (ref 0–0.1)
BASOPHILS NFR BLD AUTO: 1 % (ref 0–1)
BILIRUB SERPL-MCNC: 0.67 MG/DL (ref 0.2–1)
BUN SERPL-MCNC: 12 MG/DL (ref 5–25)
CALCIUM SERPL-MCNC: 8.8 MG/DL (ref 8.3–10.1)
CHLORIDE SERPL-SCNC: 102 MMOL/L (ref 96–108)
CHOLEST SERPL-MCNC: 191 MG/DL
CO2 SERPL-SCNC: 27 MMOL/L (ref 21–32)
CREAT SERPL-MCNC: 0.87 MG/DL (ref 0.6–1.3)
CREAT UR-MCNC: 135 MG/DL
EOSINOPHIL # BLD AUTO: 0.27 THOUSAND/ÂΜL (ref 0–0.61)
EOSINOPHIL NFR BLD AUTO: 4 % (ref 0–6)
ERYTHROCYTE [DISTWIDTH] IN BLOOD BY AUTOMATED COUNT: 12.2 % (ref 11.6–15.1)
EST. AVERAGE GLUCOSE BLD GHB EST-MCNC: 134 MG/DL
GFR SERPL CREATININE-BSD FRML MDRD: 88 ML/MIN/1.73SQ M
GLUCOSE P FAST SERPL-MCNC: 120 MG/DL (ref 65–99)
HBA1C MFR BLD: 6.3 %
HCT VFR BLD AUTO: 47.2 % (ref 36.5–49.3)
HDLC SERPL-MCNC: 69 MG/DL
HGB BLD-MCNC: 15.3 G/DL (ref 12–17)
IMM GRANULOCYTES # BLD AUTO: 0.02 THOUSAND/UL (ref 0–0.2)
IMM GRANULOCYTES NFR BLD AUTO: 0 % (ref 0–2)
LDLC SERPL CALC-MCNC: 68 MG/DL (ref 0–100)
LYMPHOCYTES # BLD AUTO: 2.84 THOUSANDS/ÂΜL (ref 0.6–4.47)
LYMPHOCYTES NFR BLD AUTO: 38 % (ref 14–44)
MCH RBC QN AUTO: 27.8 PG (ref 26.8–34.3)
MCHC RBC AUTO-ENTMCNC: 32.4 G/DL (ref 31.4–37.4)
MCV RBC AUTO: 86 FL (ref 82–98)
MICROALBUMIN UR-MCNC: 8.7 MG/L (ref 0–20)
MICROALBUMIN/CREAT 24H UR: 6 MG/G CREATININE (ref 0–30)
MONOCYTES # BLD AUTO: 0.71 THOUSAND/ÂΜL (ref 0.17–1.22)
MONOCYTES NFR BLD AUTO: 10 % (ref 4–12)
NEUTROPHILS # BLD AUTO: 3.51 THOUSANDS/ÂΜL (ref 1.85–7.62)
NEUTS SEG NFR BLD AUTO: 47 % (ref 43–75)
NONHDLC SERPL-MCNC: 122 MG/DL
NRBC BLD AUTO-RTO: 0 /100 WBCS
PLATELET # BLD AUTO: 277 THOUSANDS/UL (ref 149–390)
PMV BLD AUTO: 11.1 FL (ref 8.9–12.7)
POTASSIUM SERPL-SCNC: 3.7 MMOL/L (ref 3.5–5.3)
PROT SERPL-MCNC: 7.3 G/DL (ref 6.4–8.4)
PSA SERPL-MCNC: 3.9 NG/ML (ref 0–4)
RBC # BLD AUTO: 5.51 MILLION/UL (ref 3.88–5.62)
SODIUM SERPL-SCNC: 135 MMOL/L (ref 135–147)
TRIGL SERPL-MCNC: 269 MG/DL
TSH SERPL DL<=0.05 MIU/L-ACNC: 1.65 UIU/ML (ref 0.45–4.5)
WBC # BLD AUTO: 7.4 THOUSAND/UL (ref 4.31–10.16)

## 2023-02-23 ENCOUNTER — OFFICE VISIT (OUTPATIENT)
Dept: INTERNAL MEDICINE CLINIC | Facility: CLINIC | Age: 68
End: 2023-02-23

## 2023-02-23 VITALS
WEIGHT: 160 LBS | HEIGHT: 65 IN | TEMPERATURE: 97.7 F | DIASTOLIC BLOOD PRESSURE: 70 MMHG | HEART RATE: 50 BPM | OXYGEN SATURATION: 97 % | BODY MASS INDEX: 26.66 KG/M2 | SYSTOLIC BLOOD PRESSURE: 124 MMHG

## 2023-02-23 DIAGNOSIS — Z00.00 MEDICARE ANNUAL WELLNESS VISIT, SUBSEQUENT: ICD-10-CM

## 2023-02-23 DIAGNOSIS — J44.9 CHRONIC OBSTRUCTIVE PULMONARY DISEASE, UNSPECIFIED COPD TYPE (HCC): ICD-10-CM

## 2023-02-23 DIAGNOSIS — E66.3 OVERWEIGHT: ICD-10-CM

## 2023-02-23 DIAGNOSIS — F33.41 RECURRENT MAJOR DEPRESSIVE DISORDER, IN PARTIAL REMISSION (HCC): ICD-10-CM

## 2023-02-23 DIAGNOSIS — E78.2 MIXED HYPERLIPIDEMIA: ICD-10-CM

## 2023-02-23 DIAGNOSIS — E11.9 TYPE 2 DIABETES MELLITUS WITHOUT COMPLICATION, WITHOUT LONG-TERM CURRENT USE OF INSULIN (HCC): Primary | ICD-10-CM

## 2023-02-23 DIAGNOSIS — I10 ESSENTIAL HYPERTENSION: ICD-10-CM

## 2023-02-23 RX ORDER — LANCETS 33 GAUGE
EACH MISCELLANEOUS DAILY
Qty: 90 EACH | Refills: 1 | Status: SHIPPED | OUTPATIENT
Start: 2023-02-23 | End: 2023-05-24

## 2023-02-23 RX ORDER — BLOOD SUGAR DIAGNOSTIC
STRIP MISCELLANEOUS
Qty: 100 EACH | Refills: 3 | Status: SHIPPED | OUTPATIENT
Start: 2023-02-23

## 2023-02-23 NOTE — PROGRESS NOTES
Assessment and Plan:   Type 2 diabetes is fairly well controlled, continue the same medications  Blood pressure is stable, continue lisinopril and diltiazem  Continue atorvastatin  Continue Advair regularly, breathing is stable  Does not need to take albuterol inhaler as often  Depression stable on the Prozac      Problem List Items Addressed This Visit        Endocrine    Type 2 diabetes mellitus, without long-term current use of insulin ([facility] [address] ) - Primary     BMI Counseling: Body mass index is 26 63 kg/m²  The BMI is above normal  Nutrition recommendations include decreasing portion sizes, encouraging healthy choices of fruits and vegetables and moderation in carbohydrate intake  Rationale for BMI follow-up plan is due to patient being overweight or obese  Preventive health issues were discussed with patient, and age appropriate screening tests were ordered as noted in patient's After Visit Summary  Personalized health advice and appropriate referrals for health education or preventive services given if needed, as noted in patient's After Visit Summary  History of Present Illness:     Patient presents for a Medicare Wellness Visit    HPI   Follow up      Patient Care Team:  Kathy Olson MD as PCP - [address] [place] [address] Sebastian Pettit MD (Gastroenterology)     Review of Systems:     Review of Systems   Constitutional: Negative for chills, diaphoresis, fatigue and fever  HENT: Negative for congestion, ear discharge, ear pain, hearing loss, postnasal drip, rhinorrhea, sinus pressure, sinus pain, sneezing, sore throat and voice change  Eyes: Negative for pain, discharge, redness and visual disturbance  Respiratory: Negative for cough, chest tightness, shortness of breath and wheezing  Cardiovascular: Negative for chest pain, palpitations and leg swelling  Gastrointestinal: Negative for abdominal distention, abdominal pain, blood in stool, constipation, diarrhea, nausea and vomiting     Endocrine: Negative for cold intolerance, heat intolerance, polydipsia, polyphagia and polyuria  Genitourinary: Negative for dysuria, flank pain, frequency, hematuria and urgency  Musculoskeletal: Negative for arthralgias, back pain, gait problem, joint swelling, myalgias, neck pain and neck stiffness  Skin: Negative for rash  Neurological: Negative for dizziness, tremors, syncope, facial asymmetry, speech difficulty, weakness, light-headedness, numbness and headaches  Hematological: Does not bruise/bleed easily  Psychiatric/Behavioral: Negative for behavioral problems, confusion and sleep disturbance  The patient is not nervous/anxious           Problem List:     Patient Active Problem List   Diagnosis   • Generalized anxiety disorder   • Cardiac arrhythmia   • Chronic obstructive pulmonary disease (HCC)   • Recurrent major depressive disorder, in partial remission ([facility])   • Diastolic dysfunction   • Ebstein anomaly   • Mixed hyperlipidemia   • Essential hypertension   • Hypertensive heart disease   • Nonrheumatic pulmonary valve insufficiency   • Overweight   • Tricuspid valve disorders, non-rheumatic   • Type 2 diabetes mellitus, without long-term current use of insulin ([facility])   • Esophageal dysphagia      Past Medical and Surgical History:     Past Medical History:   Diagnosis Date   • Abnormal EKG    • Alcohol abuse    • Allergic rhinitis     last assessed: 6/4/2014   • Anxiety    • Asthma     last assesssed: 6/4/2014   • Atresia of esophagus with tracheo-esophageal fistula    • Benign neoplasm of colon    • Campylobacter enteritis     last assessed: 5/6/2015   • Cardiac arrhythmia     last assessed: 6/19/2015   • Chronic cough     last assessed: 1/29/2015   • Chronic fatigue syndrome     resolved: 6/4/2014   • Chronic sinusitis     resolved: 6/4/2014   • COPD (chronic obstructive pulmonary disease) ([facility] )    • Diabetes mellitus ([facility] )    • Digestive symptom    • EKG, abnormal    • Epistaxis    • Generalized osteoarthritis     resolved: 2014   • GERD without esophagitis 2014   • Heart disease    • HTN (hypertension)    • Hyperlipidemia    • Myalgia    • Myositis    • Poisoning by drug or medicinal substance    • Sleep apnea      Past Surgical History:   Procedure Laterality Date   • COLONOSCOPY     • ESOPHAGOGASTRODUODENOSCOPY N/A 10/2/2018    Procedure: ESOPHAGOGASTRODUODENOSCOPY (EGD); Surgeon: Taylor De Los Santos MD;  Location: [facility];  Service: Gastroenterology   • FOOT SURGERY     • OR ESOPHAGOGASTRODUODENOSCOPY TRANSORAL DIAGNOSTIC N/A 2018    Procedure: ESOPHAGOGASTRODUODENOSCOPY (EGD); Surgeon: Rico Terry MD;  Location: [facility]; Service: Gastroenterology   • TONSILLECTOMY        Family History:     Family History   Problem Relation Age of Onset   • Asthma Mother       Social History:     Social History     Socioeconomic History   • Marital status: Single     Spouse name: None   • Number of children: None   • Years of education: None   • Highest education level: None   Occupational History   • None   Tobacco Use   • Smoking status: Former     Packs/day: 0 00     Types: Cigarettes     Quit date: 10/2/2006     Years since quittin 4   • Smokeless tobacco: Never   • Tobacco comments:     cigars-social   Vaping Use   • Vaping Use: Never used   Substance and Sexual Activity   • Alcohol use: No     Comment: rarely   • Drug use: No   • Sexual activity: Yes     Partners: Female   Other Topics Concern   • None   Social History Narrative   • None     Social Determinants of Health     Financial Resource Strain: Low Risk    • Difficulty of Paying Living Expenses: Not hard at all   Food Insecurity: Not on file   Transportation Needs: No Transportation Needs   • Lack of Transportation (Medical): No   • Lack of Transportation (Non-Medical):  No   Physical Activity: Not on file   Stress: Not on file   Social Connections: Not on file   Intimate Partner Violence: Not on file   Housing Stability: Not on file      Medications and Allergies:     Current Outpatient Medications   Medication Sig Dispense Refill   • albuterol (Ventolin HFA) 90 mcg/act inhaler Inhale 2 puffs every 4 (four) hours as needed for wheezing 18 g 5   • aspirin (ECOTRIN LOW STRENGTH) 81 mg EC tablet Take 1 tablet by mouth daily     • atorvastatin (LIPITOR) 10 mg tablet TAKE 1 TABLET BY MOUTH EVERY DAY 90 tablet 2   • busPIRone (BUSPAR) 10 mg tablet Take 10 mg by mouth 2 (two) times a day  3   • diltiazem (CARDIZEM CD) 180 mg 24 hr capsule TAKE 1 CAPSULE BY MOUTH EVERY DAY 90 capsule 0   • FLUoxetine (PROzac) 40 MG capsule Take 1 capsule by mouth daily     • Fluticasone-Salmeterol (Advair Diskus) 250-50 mcg/dose inhaler  Inhale 1 puff by mouth twice a day  Rinse mouth after use 60 blister 3   • ibuprofen (MOTRIN) 600 mg tablet Take 600 mg by mouth every 6 (six) hours as needed  0   • lisinopril (ZESTRIL) 20 mg tablet TAKE 1 TABLET BY MOUTH EVERY DAY 90 tablet 3   • metFORMIN (GLUCOPHAGE) 500 mg tablet TAKE 1 TABLET BY MOUTH TWICE A  tablet 2   • ONETOUCH DELICA LANCETS FINE MISC by Does not apply route     • OneTouch Ultra test strip TESTING ONCE DAILY 100 each 3   • temazepam (RESTORIL) 30 mg capsule Take 1 capsule by mouth     • zolpidem (AMBIEN) 10 mg tablet      • Blood Glucose Monitoring Suppl (ONE TOUCH ULTRA MINI) w/Device KIT by Does not apply route daily for 30 days PT  TESTING ONCE DAILY DX:E11 9 1 each 0     No current facility-administered medications for this visit       No Known Allergies   Immunizations:     Immunization History   Administered Date(s) Administered   • COVID-19 MODERNA VACC 0 5 ML IM 02/13/2021, 03/13/2021   • INFLUENZA 10/28/2016, 10/24/2018   • Influenza Quadrivalent, 6-35 Months IM 10/06/2014, 10/28/2016, 09/08/2017   • Influenza, high dose seasonal 0 7 mL 09/16/2019, 10/17/2020   • Influenza, recombinant, quadrivalent,injectable, preservative free 10/24/2018   • Influenza, seasonal, injectable 10/21/2013, 10/06/2015   • Pneumococcal Conjugate 13-Valent 07/30/2021   • Pneumococcal Conjugate Vaccine 20-valent (Pcv20), Polysace 11/01/2022   • Pneumococcal Polysaccharide PPV23 06/04/2014, 02/27/2017   • Tdap 1955   • Zoster 01/01/2014   • Zoster Vaccine Recombinant 01/01/2014      Health Maintenance:         Topic Date Due   • Colorectal Cancer Screening  06/10/2025   • Hepatitis C Screening  Completed         Topic Date Due   • COVID-19 Vaccine (3 - Booster for Moderna series) 05/08/2021   • Influenza Vaccine (1) 09/01/2022      Medicare Screening Tests and Risk Assessments:     Brittney Lopezkeeper is here for his Subsequent Wellness visit  Health Risk Assessment:   Patient rates overall health as good  Patient feels that their physical health rating is same  Patient is satisfied with their life  Eyesight was rated as same  Hearing was rated as same  Patient feels that their emotional and mental health rating is same  Patients states they are sometimes angry  Patient states they are sometimes unusually tired/fatigued  Pain experienced in the last 7 days has been some  Patient's pain rating has been 5/10  Patient states that he has experienced no weight loss or gain in last 6 months  Depression Screening:   PHQ-9 Score: 0      Fall Risk Screening: In the past year, patient has experienced: no history of falling in past year      Home Safety:  Patient does not have trouble with stairs inside or outside of their home  Patient has working smoke alarms and has working carbon monoxide detector  Home safety hazards include: none  Nutrition:   Current diet is Regular  Medications:   Patient is currently taking over-the-counter supplements  OTC medications include: see medication list  Patient is able to manage medications       Activities of Daily Living (ADLs)/Instrumental Activities of Daily Living (IADLs):   Walk and transfer into and out of bed and chair?: Yes  Dress and groom yourself?: Yes    Bathe or shower yourself?: Yes    Feed yourself? Yes  Do your laundry/housekeeping?: Yes  Manage your money, pay your bills and track your expenses?: Yes  Make your own meals?: Yes    Do your own shopping?: Yes    Cognitive Screening:   Provider or family/friend/caregiver concerned regarding cognition?: No    PREVENTIVE SCREENINGS      Cardiovascular Screening:    General: Screening Not Indicated and History Lipid Disorder      Diabetes Screening:     General: Screening Not Indicated and History Diabetes      Colorectal Cancer Screening:     General: Screening Current      Prostate Cancer Screening:    General: Screening Current      Abdominal Aortic Aneurysm (AAA) Screening:    Risk factors include: age between 73-69 yo and tobacco use        Lung Cancer Screening:     General: Screening Not Indicated      Hepatitis C Screening:    General: Screening Current    Screening, Brief Intervention, and Referral to Treatment (SBIRT)    Screening  Typical number of drinks in a day: 0  Typical number of drinks in a week: 0  Interpretation: Low risk drinking behavior  AUDIT-C Screenin) How often did you have a drink containing alcohol in the past year? monthly or less  2) How many drinks did you have on a typical day when you were drinking in the past year? 1 to 2  3) How often did you have 6 or more drinks on one occasion in the past year? never    AUDIT-C Score: 1  Interpretation: Score 0-3 (male): Negative screen for alcohol misuse    Single Item Drug Screening:  How often have you used an illegal drug (including marijuana) or a prescription medication for non-medical reasons in the past year? never    Single Item Drug Screen Score: 0  Interpretation: Negative screen for possible drug use disorder    Other Counseling Topics:   Car/seat belt/driving safety, skin self-exam, sunscreen and calcium and vitamin D intake and regular weightbearing exercise  No results found       Physical Exam:     /70   Pulse (!) 50 Temp 97 7 °F (36 5 °C)   Ht 5' 5" (1 651 m)   Wt 72 6 kg (160 lb)   SpO2 97%   BMI 26 63 kg/m²     Physical Exam  Constitutional:       General: He is not in acute distress  Appearance: He is well-developed  He is not diaphoretic  HENT:      Head: Normocephalic and atraumatic  Right Ear: External ear normal       Left Ear: External ear normal       Nose: Nose normal    Eyes:      General: No scleral icterus  Right eye: No discharge  Left eye: No discharge  Conjunctiva/sclera: Conjunctivae normal    Cardiovascular:      Rate and Rhythm: Normal rate and regular rhythm  Pulses: no weak pulses          Dorsalis pedis pulses are 2+ on the right side and 2+ on the left side  Posterior tibial pulses are 2+ on the right side and 2+ on the left side  Heart sounds: Normal heart sounds  No murmur heard  No friction rub  No gallop  Pulmonary:      Effort: Pulmonary effort is normal  No respiratory distress  Breath sounds: Normal breath sounds  No wheezing or rales  Abdominal:      General: Bowel sounds are normal  There is no distension  Palpations: Abdomen is soft  Tenderness: There is no abdominal tenderness  There is no guarding or rebound  Musculoskeletal:         General: No tenderness  Feet:      Right foot:      Skin integrity: No ulcer, skin breakdown, erythema, warmth, callus or dry skin  Left foot:      Skin integrity: No ulcer, skin breakdown, erythema, warmth, callus or dry skin  Skin:     General: Skin is warm and dry  Findings: No erythema or rash  Neurological:      Mental Status: He is alert and oriented to person, place, and time  Cranial Nerves: No cranial nerve deficit  Sensory: No sensory deficit  Motor: No abnormal muscle tone  Psychiatric:         Behavior: Behavior normal       Patient's shoes and socks removed  Right Foot/Ankle   Right Foot Inspection  Skin Exam: skin normal and skin intact  No dry skin, no warmth, no callus, no erythema, no maceration, no abnormal color, no pre-ulcer, no ulcer and no callus  Toe Exam: ROM and strength within normal limits  Sensory   Proprioception: diminished and intact  Monofilament testing: intact    Vascular  Capillary refills: < 3 seconds  The right DP pulse is 2+  The right PT pulse is 2+  Left Foot/Ankle  Left Foot Inspection  Skin Exam: skin normal and skin intact  No dry skin, no warmth, no erythema, no maceration, normal color, no pre-ulcer, no ulcer and no callus  Toe Exam: ROM and strength within normal limits  Sensory   Proprioception: intact  Monofilament testing: intact    Vascular  Capillary refills: < 3 seconds  The left DP pulse is 2+  The left PT pulse is 2+       Assign Risk Category  No deformity present  No loss of protective sensation  No weak pulses  Risk: 0      Tej Hendricks MD

## 2023-04-01 DIAGNOSIS — J44.9 COPD MIXED TYPE (HCC): ICD-10-CM

## 2023-04-01 RX ORDER — FLUTICASONE PROPIONATE AND SALMETEROL 50; 250 UG/1; UG/1
POWDER RESPIRATORY (INHALATION)
Qty: 60 BLISTER | Refills: 3 | Status: SHIPPED | OUTPATIENT
Start: 2023-04-01

## 2023-05-13 DIAGNOSIS — J44.9 CHRONIC OBSTRUCTIVE PULMONARY DISEASE, UNSPECIFIED COPD TYPE (HCC): ICD-10-CM

## 2023-05-13 RX ORDER — ALBUTEROL SULFATE 90 UG/1
AEROSOL, METERED RESPIRATORY (INHALATION)
Qty: 18 G | Refills: 5 | Status: SHIPPED | OUTPATIENT
Start: 2023-05-13

## 2023-05-18 ENCOUNTER — TELEPHONE (OUTPATIENT)
Age: 68
End: 2023-05-18

## 2023-05-28 DIAGNOSIS — I10 ESSENTIAL HYPERTENSION: ICD-10-CM

## 2023-05-28 RX ORDER — DILTIAZEM HYDROCHLORIDE 180 MG/1
CAPSULE, COATED, EXTENDED RELEASE ORAL
Qty: 90 CAPSULE | Refills: 0 | Status: SHIPPED | OUTPATIENT
Start: 2023-05-28

## 2023-08-27 DIAGNOSIS — I10 ESSENTIAL HYPERTENSION: ICD-10-CM

## 2023-08-27 RX ORDER — DILTIAZEM HYDROCHLORIDE 180 MG/1
CAPSULE, COATED, EXTENDED RELEASE ORAL
Qty: 90 CAPSULE | Refills: 0 | Status: SHIPPED | OUTPATIENT
Start: 2023-08-27 | End: 2023-09-19

## 2023-09-19 DIAGNOSIS — I10 ESSENTIAL HYPERTENSION: ICD-10-CM

## 2023-09-19 RX ORDER — DILTIAZEM HYDROCHLORIDE 180 MG/1
CAPSULE, COATED, EXTENDED RELEASE ORAL
Qty: 90 CAPSULE | Refills: 1 | Status: SHIPPED | OUTPATIENT
Start: 2023-09-19

## 2023-10-14 ENCOUNTER — APPOINTMENT (OUTPATIENT)
Age: 68
End: 2023-10-14
Payer: MEDICARE

## 2023-10-14 DIAGNOSIS — E11.9 TYPE 2 DIABETES MELLITUS WITHOUT COMPLICATION, WITHOUT LONG-TERM CURRENT USE OF INSULIN (HCC): ICD-10-CM

## 2023-10-14 LAB
ALBUMIN SERPL BCP-MCNC: 3.9 G/DL (ref 3.5–5)
ALP SERPL-CCNC: 79 U/L (ref 34–104)
ALT SERPL W P-5'-P-CCNC: 8 U/L (ref 7–52)
ANION GAP SERPL CALCULATED.3IONS-SCNC: 8 MMOL/L
AST SERPL W P-5'-P-CCNC: 20 U/L (ref 13–39)
BASOPHILS # BLD AUTO: 0.06 THOUSANDS/ÂΜL (ref 0–0.1)
BASOPHILS NFR BLD AUTO: 1 % (ref 0–1)
BILIRUB SERPL-MCNC: 0.43 MG/DL (ref 0.2–1)
BUN SERPL-MCNC: 15 MG/DL (ref 5–25)
CALCIUM SERPL-MCNC: 8.9 MG/DL (ref 8.4–10.2)
CHLORIDE SERPL-SCNC: 101 MMOL/L (ref 96–108)
CHOLEST SERPL-MCNC: 162 MG/DL
CO2 SERPL-SCNC: 27 MMOL/L (ref 21–32)
CREAT SERPL-MCNC: 0.81 MG/DL (ref 0.6–1.3)
EOSINOPHIL # BLD AUTO: 0.32 THOUSAND/ÂΜL (ref 0–0.61)
EOSINOPHIL NFR BLD AUTO: 4 % (ref 0–6)
ERYTHROCYTE [DISTWIDTH] IN BLOOD BY AUTOMATED COUNT: 12.2 % (ref 11.6–15.1)
EST. AVERAGE GLUCOSE BLD GHB EST-MCNC: 131 MG/DL
GFR SERPL CREATININE-BSD FRML MDRD: 90 ML/MIN/1.73SQ M
GLUCOSE P FAST SERPL-MCNC: 79 MG/DL (ref 65–99)
HBA1C MFR BLD: 6.2 %
HCT VFR BLD AUTO: 44.3 % (ref 36.5–49.3)
HDLC SERPL-MCNC: 55 MG/DL
HGB BLD-MCNC: 14.1 G/DL (ref 12–17)
IMM GRANULOCYTES # BLD AUTO: 0.02 THOUSAND/UL (ref 0–0.2)
IMM GRANULOCYTES NFR BLD AUTO: 0 % (ref 0–2)
LDLC SERPL CALC-MCNC: 67 MG/DL (ref 0–100)
LYMPHOCYTES # BLD AUTO: 2.86 THOUSANDS/ÂΜL (ref 0.6–4.47)
LYMPHOCYTES NFR BLD AUTO: 39 % (ref 14–44)
MCH RBC QN AUTO: 27.9 PG (ref 26.8–34.3)
MCHC RBC AUTO-ENTMCNC: 31.8 G/DL (ref 31.4–37.4)
MCV RBC AUTO: 88 FL (ref 82–98)
MONOCYTES # BLD AUTO: 0.82 THOUSAND/ÂΜL (ref 0.17–1.22)
MONOCYTES NFR BLD AUTO: 11 % (ref 4–12)
NEUTROPHILS # BLD AUTO: 3.32 THOUSANDS/ÂΜL (ref 1.85–7.62)
NEUTS SEG NFR BLD AUTO: 45 % (ref 43–75)
NONHDLC SERPL-MCNC: 107 MG/DL
NRBC BLD AUTO-RTO: 0 /100 WBCS
PLATELET # BLD AUTO: 273 THOUSANDS/UL (ref 149–390)
PMV BLD AUTO: 11.2 FL (ref 8.9–12.7)
POTASSIUM SERPL-SCNC: 3.8 MMOL/L (ref 3.5–5.3)
PROT SERPL-MCNC: 6.8 G/DL (ref 6.4–8.4)
RBC # BLD AUTO: 5.05 MILLION/UL (ref 3.88–5.62)
SODIUM SERPL-SCNC: 136 MMOL/L (ref 135–147)
TRIGL SERPL-MCNC: 198 MG/DL
TSH SERPL DL<=0.05 MIU/L-ACNC: 1.66 UIU/ML (ref 0.45–4.5)
WBC # BLD AUTO: 7.4 THOUSAND/UL (ref 4.31–10.16)

## 2023-10-14 PROCEDURE — 84443 ASSAY THYROID STIM HORMONE: CPT

## 2023-10-14 PROCEDURE — 80053 COMPREHEN METABOLIC PANEL: CPT

## 2023-10-14 PROCEDURE — 83036 HEMOGLOBIN GLYCOSYLATED A1C: CPT

## 2023-10-14 PROCEDURE — 85025 COMPLETE CBC W/AUTO DIFF WBC: CPT

## 2023-10-14 PROCEDURE — 36415 COLL VENOUS BLD VENIPUNCTURE: CPT

## 2023-10-14 PROCEDURE — 80061 LIPID PANEL: CPT

## 2023-10-17 ENCOUNTER — RA CDI HCC (OUTPATIENT)
Dept: OTHER | Facility: HOSPITAL | Age: 68
End: 2023-10-17

## 2023-10-19 ENCOUNTER — OFFICE VISIT (OUTPATIENT)
Age: 68
End: 2023-10-19
Payer: MEDICARE

## 2023-10-19 VITALS
RESPIRATION RATE: 16 BRPM | WEIGHT: 155 LBS | BODY MASS INDEX: 25.83 KG/M2 | TEMPERATURE: 98.1 F | DIASTOLIC BLOOD PRESSURE: 72 MMHG | OXYGEN SATURATION: 99 % | HEART RATE: 74 BPM | SYSTOLIC BLOOD PRESSURE: 126 MMHG | HEIGHT: 65 IN

## 2023-10-19 DIAGNOSIS — E78.2 MIXED HYPERLIPIDEMIA: ICD-10-CM

## 2023-10-19 DIAGNOSIS — I10 ESSENTIAL HYPERTENSION: ICD-10-CM

## 2023-10-19 DIAGNOSIS — J44.9 CHRONIC OBSTRUCTIVE PULMONARY DISEASE, UNSPECIFIED COPD TYPE (HCC): ICD-10-CM

## 2023-10-19 DIAGNOSIS — Z23 ENCOUNTER FOR IMMUNIZATION: ICD-10-CM

## 2023-10-19 DIAGNOSIS — B35.1 ONYCHOMYCOSIS: ICD-10-CM

## 2023-10-19 DIAGNOSIS — F41.1 GENERALIZED ANXIETY DISORDER: ICD-10-CM

## 2023-10-19 DIAGNOSIS — F33.41 RECURRENT MAJOR DEPRESSIVE DISORDER, IN PARTIAL REMISSION (HCC): ICD-10-CM

## 2023-10-19 DIAGNOSIS — E11.9 TYPE 2 DIABETES MELLITUS WITHOUT COMPLICATION, WITHOUT LONG-TERM CURRENT USE OF INSULIN (HCC): Primary | ICD-10-CM

## 2023-10-19 PROCEDURE — G0008 ADMIN INFLUENZA VIRUS VAC: HCPCS | Performed by: INTERNAL MEDICINE

## 2023-10-19 PROCEDURE — 90662 IIV NO PRSV INCREASED AG IM: CPT | Performed by: INTERNAL MEDICINE

## 2023-10-19 PROCEDURE — 99214 OFFICE O/P EST MOD 30 MIN: CPT | Performed by: INTERNAL MEDICINE

## 2023-10-19 NOTE — PROGRESS NOTES
INTERNAL MEDICINE OFFICE VISIT  [facility] AT [facility] [place], [address] [facility]  Tel: [phone]      NAME: Olga Jasso  AGE: 70 y.o. SEX: male  : 1955   MRN: [id number]    DATE: 10/19/2023  TIME: 11:05 AM      Assessment and Plan:  1. Type 2 diabetes mellitus without complication, without long-term current use of insulin ([address])  Fairly well controlled on the present medication, continue the same    - Hemoglobin A1C; Future  - Comprehensive metabolic panel; Future  - CBC and differential; Future  - TSH, 3rd generation with Free T4 reflex; Future  - Lipid Panel with Direct LDL reflex; Future  - Microalbumin, Random Urine (W/Creatinine) ([facility] ONLY); Future    2. Essential hypertension  Blood pressure is controlled, continue lisinopril and diltiazem at the same dose    3. Mixed hyperlipidemia  Continue atorvastatin    4. Chronic obstructive pulmonary disease, unspecified COPD type ([address])  Continue inhalers, symptoms are controlled at present    5. Generalized anxiety disorder  He is taking BuSpar but does not know the dose. It seems the dose was increased to 15 mg by his psychiatrist but he is not sure about it, he will go home and check    6. Recurrent major depressive disorder, in partial remission (HCC)  Continue Prozac    7. Encounter for immunization    - influenza vaccine, high-dose, PF 0.7 mL (FLUZONE HIGH-DOSE)      - Counseling Documentation: patient was counseled regarding: diagnostic results, instructions for management, risk factor reductions, prognosis, patient and family education, risks and benefits of treatment options, and importance of compliance with treatment  - Medication Side Effects: Adverse side effects of medications were reviewed with the patient/guardian today. Return for follow up visit in 4 months or earlier, if needed.       Chief Complaint:  Chief Complaint   Patient presents with    Follow-up     6 month          History of Present Illness:   Type 2 diabetes, hypertension and hyperlipidemia are well controlled as he takes his medications regularly  COPD is stable on the inhalers  He has been taking medication for the anxiety and depression but remains anxious most of the times.   He does follow-up with a psychiatrist.  He has fungus in his toes and wanted medication for it      Active Problem List:  Patient Active Problem List   Diagnosis    Generalized anxiety disorder    Cardiac arrhythmia    Chronic obstructive pulmonary disease (HCC)    Recurrent major depressive disorder, in partial remission ([address])    Diastolic dysfunction    Ebstein anomaly    Mixed hyperlipidemia    Essential hypertension    Hypertensive heart disease    Nonrheumatic pulmonary valve insufficiency    Overweight    Tricuspid valve disorders, non-rheumatic    Type 2 diabetes mellitus, without long-term current use of insulin ([address])    Esophageal dysphagia         Past Medical History:  Past Medical History:   Diagnosis Date    Abnormal EKG     Alcohol abuse     Allergic rhinitis     last assessed: 6/4/2014    Anxiety     Asthma     last assesssed: 6/4/2014    Atresia of esophagus with tracheo-esophageal fistula     Benign neoplasm of colon     Campylobacter enteritis     last assessed: 5/6/2015    Cardiac arrhythmia     last assessed: 6/19/2015    Chronic cough     last assessed: 1/29/2015    Chronic fatigue syndrome     resolved: 6/4/2014    Chronic sinusitis     resolved: 6/4/2014    COPD (chronic obstructive pulmonary disease) ([facility])     Diabetes mellitus ([address])     Digestive symptom     EKG, abnormal     Epistaxis     Generalized osteoarthritis     resolved: 6/4/2014    GERD without esophagitis 5/14/2014    Heart disease     HTN (hypertension)     Hyperlipidemia     Myalgia     Myositis     Poisoning by drug or medicinal substance     Sleep apnea          Past Surgical History:  Past Surgical History:   Procedure Laterality Date    COLONOSCOPY ESOPHAGOGASTRODUODENOSCOPY N/A 10/2/2018    Procedure: ESOPHAGOGASTRODUODENOSCOPY (EGD); Surgeon: Mable Saunders MD;  Location: [facility];  Service: Gastroenterology    FOOT SURGERY      RI ESOPHAGOGASTRODUODENOSCOPY TRANSORAL DIAGNOSTIC N/A 2018    Procedure: ESOPHAGOGASTRODUODENOSCOPY (EGD); Surgeon: Oneal Mota MD;  Location: [facility]; Service: Gastroenterology    TONSILLECTOMY           Family History:  Family History   Problem Relation Age of Onset    Asthma Mother          Social History:  Social History     Socioeconomic History    Marital status: Single     Spouse name: None    Number of children: None    Years of education: None    Highest education level: None   Occupational History    None   Tobacco Use    Smoking status: Former     Packs/day: 0.00     Types: Cigarettes     Quit date: 10/2/2006     Years since quittin.0    Smokeless tobacco: Never    Tobacco comments:     cigars-social   Vaping Use    Vaping Use: Never used   Substance and Sexual Activity    Alcohol use: No     Comment: rarely    Drug use: No    Sexual activity: Yes     Partners: Female   Other Topics Concern    None   Social History Narrative    None     Social Determinants of Health     Financial Resource Strain: Low Risk  (2023)    Overall Financial Resource Strain (CARDIA)     Difficulty of Paying Living Expenses: Not hard at all   Food Insecurity: Not on file   Transportation Needs: No Transportation Needs (2023)    PRAPARE - Transportation     Lack of Transportation (Medical): No     Lack of Transportation (Non-Medical):  No   Physical Activity: Insufficiently Active (2021)    Exercise Vital Sign     Days of Exercise per Week: 3 days     Minutes of Exercise per Session: 40 min   Stress: No Stress Concern Present (2021)    [address] [place] [address]     Feeling of Stress : Not at all   Social Connections: Not on file   Intimate Partner Violence: Not on file   Housing Stability: Not on file         Allergies:  No Known Allergies      Medications:    Current Outpatient Medications:     albuterol (PROVENTIL HFA,VENTOLIN HFA) 90 mcg/act inhaler, TAKE 2 PUFFS BY MOUTH EVERY 4 HOURS AS NEEDED FOR WHEEZE, Disp: 18 g, Rfl: 5    aspirin (ECOTRIN LOW STRENGTH) 81 mg EC tablet, Take 1 tablet by mouth daily, Disp: , Rfl:     atorvastatin (LIPITOR) 10 mg tablet, TAKE 1 TABLET BY MOUTH EVERY DAY, Disp: 90 tablet, Rfl: 2    budesonide-formoterol (Symbicort) 160-4.5 mcg/act inhaler, Inhale 1 puff 2 (two) times a day, Disp: 10.2 g, Rfl: 5    busPIRone (BUSPAR) 10 mg tablet, Take 10 mg by mouth 2 (two) times a day, Disp: , Rfl: 3    diltiazem (CARDIZEM CD) 180 mg 24 hr capsule, TAKE 1 CAPSULE BY MOUTH EVERY DAY, Disp: 90 capsule, Rfl: 1    FLUoxetine (PROzac) 40 MG capsule, Take 1 capsule by mouth daily, Disp: , Rfl:     glucose blood (OneTouch Ultra) test strip, Test once daily E11.9, Disp: 100 each, Rfl: 3    ibuprofen (MOTRIN) 600 mg tablet, Take 600 mg by mouth every 6 (six) hours as needed, Disp: , Rfl: 0    lisinopril (ZESTRIL) 20 mg tablet, TAKE 1 TABLET BY MOUTH EVERY DAY, Disp: 90 tablet, Rfl: 3    metFORMIN (GLUCOPHAGE) 500 mg tablet, TAKE 1 TABLET BY MOUTH TWICE A DAY, Disp: 180 tablet, Rfl: 2    ONETOUCH DELICA LANCETS FINE MISC, by Does not apply route, Disp: , Rfl:     temazepam (RESTORIL) 30 mg capsule, Take 1 capsule by mouth, Disp: , Rfl:     zolpidem (AMBIEN) 10 mg tablet, , Disp: , Rfl:     Blood Glucose Monitoring Suppl (ONE TOUCH ULTRA MINI) w/Device KIT, by Does not apply route daily for 30 days PT. TESTING ONCE DAILY DX:E11.9, Disp: 1 each, Rfl: 0    OneTouch Delica Lancets 26I MISC, Use in the morning, Disp: 90 each, Rfl: 1      The following portions of the patient's history were reviewed and updated as appropriate: past medical history, past surgical history, family history, social history, allergies, current medications and active problem list.      Review of Systems:  Constitutional: Denies fever, chills, weight gain, weight loss, fatigue  Eyes: Denies eye redness, eye discharge, double vision, change in visual acuity  ENT: Denies hearing loss, tinnitus, sneezing, nasal congestion, nasal discharge, sore throat   Respiratory: Denies cough, expectoration, hemoptysis, shortness of breath, wheezing  Cardiovascular: Denies chest pain, palpitations, lower extremity swelling, orthopnea, PND  Gastrointestinal: Denies abdominal pain, heartburn, nausea, vomiting, hematemesis, diarrhea, bloody stools  Genito-Urinary: Denies dysuria, frequency, difficulty in micturition, nocturia, incontinence  Musculoskeletal: Denies back pain, joint pain, muscle pain  Neurologic: Denies confusion, lightheadedness, syncope, headache, focal weakness, sensory changes, seizures  Endocrine: Denies polyuria, polydipsia, temperature intolerance  Allergy and Immunology: Denies hives, insect bite sensitivity  Hematological and Lymphatic: Denies bleeding problems, swollen glands   Psychological: Complains of depression, anxiety, panic, mood swings  Dermatological: Denies pruritus, rash, skin lesion changes      Vitals:  Vitals:    10/19/23 1043   BP: 126/72   Pulse: 74   Resp: 16   Temp: 98.1 °F (36.7 °C)   SpO2: 99%       Body mass index is 25.79 kg/m². Weight (last 2 days)       Date/Time Weight    10/19/23 1043 70.3 (155)              Physical Examination:  General: Patient is not in acute distress. Awake, alert, responding to commands. No weight gain or loss  Head: Normocephalic. Atraumatic  Eyes: Conjunctiva and lids with no swelling, erythema or discharge. Both pupils normal sized, round and reactive to light. Sclera nonicteric  ENT: External examination of nose and ear normal. Otoscopic examination shows translucent tympanic membranes with patent canals without erythema. Oropharynx moist with no erythema, edema, exudate or lesions  Neck: Supple. JVP not raised.  Trachea midline. No masses. No thyromegaly  Lungs: No signs of increased work of breathing or respiratory distress. Bilateral bronchovascular breath sounds with no crackles or rhonchi  Chest wall: No tenderness  Cardiovascular: Normal PMI. No thrills. Regular rate and rhythm. S1 and S2 normal. No murmur, rub or gallop  Gastrointestinal: Abdomen soft, nontender. No guarding or rigidity. Liver and spleen not palpable. Bowel sounds present  Neurologic: Cranial nerves II-XII intact.  Cortical functions normal. Motor system - Reflexes 2+ and symmetrical. Sensations normal  Musculoskeletal: Gait normal. No joint tenderness  Integumentary: Skin normal with no rash or lesions  Lymphatic: No palpable lymph nodes in neck, axilla or groin  Extremities: No clubbing, cyanosis, edema or varicosities  Psychological: Judgement and insight normal.  Anxious      Laboratory Results:  CBC with diff:   Lab Results   Component Value Date    WBC 7.40 10/14/2023    WBC 8.33 10/19/2015    RBC 5.05 10/14/2023    RBC 5.46 10/19/2015    HGB 14.1 10/14/2023    HGB 14.8 10/19/2015    HCT 44.3 10/14/2023    HCT 44.9 10/19/2015    MCV 88 10/14/2023    MCV 82 10/19/2015    MCH 27.9 10/14/2023    MCH 27.1 10/19/2015    RDW 12.2 10/14/2023    RDW 13.0 10/19/2015     10/14/2023     10/19/2015       CMP:  Lab Results   Component Value Date    CREATININE 0.81 10/14/2023    CREATININE 0.85 10/19/2015    BUN 15 10/14/2023    BUN 10 10/19/2015     10/19/2015    K 3.8 10/14/2023    K 3.6 10/19/2015     10/14/2023     10/19/2015    CO2 27 10/14/2023    CO2 27 10/19/2015    GLUCOSE 142 (H) 10/19/2015    PROT 7.3 10/19/2015    ALKPHOS 79 10/14/2023    ALKPHOS 87 10/19/2015    ALT 8 10/14/2023    ALT 17 10/19/2015    AST 20 10/14/2023    AST 17 10/19/2015       Lab Results   Component Value Date    HGBA1C 6.2 (H) 10/14/2023    HGBA1C 6.7 (H) 10/19/2015       No results found for: "TROPONINI", "CKMB", "CKTOTAL"    Lipid Profile:   Lab Results   Component Value Date    CHOL 139 10/19/2015    CHOL 156 05/30/2015     Lab Results   Component Value Date    HDL 55 10/14/2023    HDL 69 02/16/2023     Lab Results   Component Value Date    LDLCALC 67 10/14/2023    LDLCALC 68 02/16/2023     Lab Results   Component Value Date    TRIG 198 (H) 10/14/2023    TRIG 269 (H) 02/16/2023       Imaging Results:       Health Maintenance:  Health Maintenance   Topic Date Due    COVID-19 Vaccine (3 - Moderna series) 05/08/2021    BMI: Followup Plan  02/23/2024    BMI: Adult  02/23/2024    Kidney Health Evaluation: Albumin/Creatinine Ratio  02/16/2024    Fall Risk  02/23/2024    Medicare Annual Wellness Visit (AWV)  02/23/2024    Diabetic Foot Exam  02/23/2024    HEMOGLOBIN A1C  04/14/2024    Depression Remission PHQ  04/19/2024    Kidney Health Evaluation: GFR  10/14/2024    DM Eye Exam  02/23/2025    Colorectal Cancer Screening  06/10/2025    Hepatitis C Screening  Completed    Pneumococcal Vaccine: 65+ Years  Completed    Influenza Vaccine  Completed    HIB Vaccine  Aged Out    IPV Vaccine  Aged Out    Hepatitis A Vaccine  Aged Out    Meningococcal ACWY Vaccine  Aged Out    HPV Vaccine  Aged Out     Immunization History   Administered Date(s) Administered    COVID-19 MODERNA VACC 0.5 ML IM 02/13/2021, 03/13/2021    INFLUENZA 10/28/2016, 10/24/2018    Influenza Quadrivalent, 6-35 Months IM 10/06/2014, 10/28/2016, 09/08/2017    Influenza, high dose seasonal 0.7 mL 09/16/2019, 10/17/2020, 10/19/2023    Influenza, recombinant, quadrivalent,injectable, preservative free 10/24/2018    Influenza, seasonal, injectable 10/21/2013, 10/06/2015    Pneumococcal Conjugate 13-Valent 07/30/2021    Pneumococcal Conjugate Vaccine 20-valent (Pcv20), Polysace 11/01/2022    Pneumococcal Polysaccharide PPV23 06/04/2014, 02/27/2017    Tdap 1955    Zoster 01/01/2014    Zoster Vaccine Recombinant 01/01/2014         Venus Patterson MD  10/19/2023,11:05 AM

## 2023-11-24 DIAGNOSIS — I10 ESSENTIAL HYPERTENSION: ICD-10-CM

## 2023-11-24 DIAGNOSIS — E78.2 MIXED HYPERLIPIDEMIA: ICD-10-CM

## 2023-11-26 RX ORDER — LISINOPRIL 20 MG/1
TABLET ORAL
Qty: 90 TABLET | Refills: 3 | Status: SHIPPED | OUTPATIENT
Start: 2023-11-26

## 2023-11-26 RX ORDER — ATORVASTATIN CALCIUM 10 MG/1
TABLET, FILM COATED ORAL
Qty: 90 TABLET | Refills: 2 | Status: SHIPPED | OUTPATIENT
Start: 2023-11-26

## 2023-12-12 ENCOUNTER — OFFICE VISIT (OUTPATIENT)
Age: 68
End: 2023-12-12
Payer: MEDICARE

## 2023-12-12 VITALS
TEMPERATURE: 97.9 F | OXYGEN SATURATION: 98 % | SYSTOLIC BLOOD PRESSURE: 102 MMHG | HEART RATE: 78 BPM | DIASTOLIC BLOOD PRESSURE: 70 MMHG | RESPIRATION RATE: 18 BRPM | BODY MASS INDEX: 26.33 KG/M2 | HEIGHT: 65 IN | WEIGHT: 158 LBS

## 2023-12-12 DIAGNOSIS — J02.9 PHARYNGITIS, UNSPECIFIED ETIOLOGY: ICD-10-CM

## 2023-12-12 DIAGNOSIS — H10.33 ACUTE BACTERIAL CONJUNCTIVITIS OF BOTH EYES: ICD-10-CM

## 2023-12-12 DIAGNOSIS — J44.0 CHRONIC OBSTRUCTIVE PULMONARY DISEASE WITH ACUTE LOWER RESPIRATORY INFECTION (HCC): Primary | ICD-10-CM

## 2023-12-12 LAB
S PYO AG THROAT QL: NEGATIVE
SARS-COV-2 AG UPPER RESP QL IA: NEGATIVE
VALID CONTROL: NORMAL

## 2023-12-12 PROCEDURE — 99213 OFFICE O/P EST LOW 20 MIN: CPT | Performed by: INTERNAL MEDICINE

## 2023-12-12 PROCEDURE — 87811 SARS-COV-2 COVID19 W/OPTIC: CPT | Performed by: INTERNAL MEDICINE

## 2023-12-12 PROCEDURE — 87880 STREP A ASSAY W/OPTIC: CPT | Performed by: INTERNAL MEDICINE

## 2023-12-12 RX ORDER — OFLOXACIN 3 MG/ML
1 SOLUTION/ DROPS OPHTHALMIC 4 TIMES DAILY
Qty: 10 ML | Refills: 1 | Status: SHIPPED | OUTPATIENT
Start: 2023-12-12

## 2023-12-12 RX ORDER — BUSPIRONE HYDROCHLORIDE 15 MG/1
TABLET ORAL
COMMUNITY
Start: 2023-11-16

## 2023-12-12 RX ORDER — AMOXICILLIN AND CLAVULANATE POTASSIUM 875; 125 MG/1; MG/1
1 TABLET, FILM COATED ORAL EVERY 12 HOURS SCHEDULED
Qty: 20 TABLET | Refills: 0 | Status: SHIPPED | OUTPATIENT
Start: 2023-12-12 | End: 2023-12-22

## 2023-12-12 NOTE — PROGRESS NOTES
INTERNAL MEDICINE FOLLOW-UP OFFICE VISIT  [facility]HAVIOR[facility] MEDIC[facility] AT [facility]    NAME: Kevin Sanchez  AGE: 70 y.o. SEX: male  : 1955   MRN: [id number]    DATE: 2023  TIME: 10:18 AM    Assessment and Plan     1. Chronic obstructive pulmonary disease with acute lower respiratory infection (HCC)  Was told to continue taking the Symbicort twice a day and albuterol inhaler as needed. Was given Augmentin as he has a lot of congestion and cough which is not going away. - amoxicillin-clavulanate (AUGMENTIN) 875-125 mg per tablet; Take 1 tablet by mouth every 12 (twelve) hours for 10 days  Dispense: 20 tablet; Refill: 0    2. Pharyngitis, unspecified etiology    - POCT Rapid Covid Ag  - POCT rapid strepA    3. Acute bacterial conjunctivitis of both eyes    - ofloxacin (OCUFLOX) 0.3 % ophthalmic solution; Administer 1 drop to both eyes 4 (four) times a day  Dispense: 10 mL; Refill: 1    - Counseling Documentation: patient was counseled regarding: diagnostic results, instructions for management, risk factor reductions, prognosis, patient and family education, risks and benefits of treatment options, and importance of compliance with treatment  - Medication Side Effects: Adverse side effects of medications were reviewed with the patient/guardian today. Return to office in: As needed    Chief Complaint     Chief Complaint   Patient presents with    Cough     Symptoms started Friday morning     Eye Problem     Possible pink eye     Sore Throat       History of Present Illness     URI   This is a new problem. The current episode started in the past 7 days. The problem has been gradually worsening. There has been no fever. Associated symptoms include congestion, coughing, headaches, rhinorrhea, a sore throat and wheezing. Pertinent negatives include no abdominal pain, chest pain, diarrhea, dysuria, ear pain, nausea, neck pain, rash, sinus pain, sneezing or vomiting.  He has tried inhaler use and acetaminophen for the symptoms. The treatment provided no relief. The following portions of the patient's history were reviewed and updated as appropriate: allergies, current medications, past family history, past medical history, past social history, past surgical history and problem list.    Review of Systems     Review of Systems   Constitutional:  Negative for chills, diaphoresis, fatigue and fever. HENT:  Positive for congestion, rhinorrhea and sore throat. Negative for ear discharge, ear pain, hearing loss, postnasal drip, sinus pressure, sinus pain, sneezing and voice change. Eyes:  Positive for discharge and redness. Negative for pain and visual disturbance. Respiratory:  Positive for cough, shortness of breath and wheezing. Negative for chest tightness. Cardiovascular:  Negative for chest pain, palpitations and leg swelling. Gastrointestinal:  Negative for abdominal distention, abdominal pain, blood in stool, constipation, diarrhea, nausea and vomiting. Endocrine: Negative for cold intolerance, heat intolerance, polydipsia, polyphagia and polyuria. Genitourinary:  Negative for dysuria, flank pain, frequency, hematuria and urgency. Musculoskeletal:  Negative for arthralgias, back pain, gait problem, joint swelling, myalgias, neck pain and neck stiffness. Skin:  Negative for rash. Neurological:  Positive for headaches. Negative for dizziness, tremors, syncope, facial asymmetry, speech difficulty, weakness, light-headedness and numbness. Hematological:  Does not bruise/bleed easily. Psychiatric/Behavioral:  Negative for behavioral problems, confusion and sleep disturbance. The patient is not nervous/anxious.         Active Problem List     Patient Active Problem List   Diagnosis    Generalized anxiety disorder    Cardiac arrhythmia    Chronic obstructive pulmonary disease (HCC)    Recurrent major depressive disorder, in partial remission (HCC)    Diastolic dysfunction    Ebstein anomaly    Mixed hyperlipidemia    Essential hypertension    Hypertensive heart disease    Nonrheumatic pulmonary valve insufficiency    Overweight    Tricuspid valve disorders, non-rheumatic    Type 2 diabetes mellitus, without long-term current use of insulin (HCC)    Esophageal dysphagia       Objective     /70 (BP Location: Left arm, Patient Position: Sitting, Cuff Size: Standard)   Pulse 78   Temp 97.9 °F (36.6 °C) (Tympanic)   Resp 18   Ht 5' 5" (1.651 m)   Wt 71.7 kg (158 lb)   SpO2 98%   BMI 26.29 kg/m²     Physical Exam  Constitutional:       General: He is not in acute distress. Appearance: He is well-developed. He is not diaphoretic. HENT:      Head: Normocephalic and atraumatic. Right Ear: External ear normal.      Left Ear: External ear normal.      Nose: Nose normal.      Mouth/Throat:      Pharynx: Posterior oropharyngeal erythema present. Eyes:      General: No scleral icterus. Right eye: No discharge. Left eye: No discharge. Comments: Bilateral bacterial conjunctivitis   Neck:      Thyroid: No thyromegaly. Vascular: No JVD. Trachea: No tracheal deviation. Cardiovascular:      Rate and Rhythm: Normal rate and regular rhythm. Heart sounds: Normal heart sounds. No murmur heard. No friction rub. No gallop. Pulmonary:      Effort: Pulmonary effort is normal. No respiratory distress. Breath sounds: Wheezing present. No rales. Chest:      Chest wall: No tenderness. Abdominal:      General: Bowel sounds are normal. There is no distension. Palpations: Abdomen is soft. Tenderness: There is no abdominal tenderness. There is no guarding or rebound. Musculoskeletal:         General: No tenderness. Normal range of motion. Cervical back: Normal range of motion and neck supple. Lymphadenopathy:      Cervical: No cervical adenopathy. Skin:     General: Skin is warm and dry. Findings: No erythema or rash. Neurological:      Mental Status: He is alert and oriented to person, place, and time. Cranial Nerves: No cranial nerve deficit. Motor: No abnormal muscle tone.       Coordination: Coordination normal.   Psychiatric:         Judgment: Judgment normal.         Pertinent Laboratory/Diagnostic Studies:        Current Medications       Current Outpatient Medications:     albuterol (PROVENTIL HFA,VENTOLIN HFA) 90 mcg/act inhaler, TAKE 2 PUFFS BY MOUTH EVERY 4 HOURS AS NEEDED FOR WHEEZE, Disp: 18 g, Rfl: 5    amoxicillin-clavulanate (AUGMENTIN) 875-125 mg per tablet, Take 1 tablet by mouth every 12 (twelve) hours for 10 days, Disp: 20 tablet, Rfl: 0    aspirin (ECOTRIN LOW STRENGTH) 81 mg EC tablet, Take 1 tablet by mouth daily, Disp: , Rfl:     atorvastatin (LIPITOR) 10 mg tablet, TAKE 1 TABLET BY MOUTH EVERY DAY, Disp: 90 tablet, Rfl: 2    budesonide-formoterol (Symbicort) 160-4.5 mcg/act inhaler, Inhale 1 puff 2 (two) times a day, Disp: 10.2 g, Rfl: 5    busPIRone (BUSPAR) 15 mg tablet, TAKE 1 TABLET BY MOUTH TWICE DAILY FOR 90 DAYS, Disp: , Rfl:     ciclopirox (PENLAC) 8 % solution, Apply topically daily at bedtime, Disp: 6 mL, Rfl: 3    diltiazem (CARDIZEM CD) 180 mg 24 hr capsule, TAKE 1 CAPSULE BY MOUTH EVERY DAY, Disp: 90 capsule, Rfl: 1    FLUoxetine (PROzac) 40 MG capsule, Take 1 capsule by mouth daily, Disp: , Rfl:     glucose blood (OneTouch Ultra) test strip, Test once daily E11.9, Disp: 100 each, Rfl: 3    lisinopril (ZESTRIL) 20 mg tablet, TAKE 1 TABLET BY MOUTH EVERY DAY, Disp: 90 tablet, Rfl: 3    metFORMIN (GLUCOPHAGE) 500 mg tablet, TAKE 1 TABLET BY MOUTH TWICE A DAY, Disp: 180 tablet, Rfl: 2    ofloxacin (OCUFLOX) 0.3 % ophthalmic solution, Administer 1 drop to both eyes 4 (four) times a day, Disp: 10 mL, Rfl: 1    ONETOUCH DELICA LANCETS FINE MISC, by Does not apply route, Disp: , Rfl:     temazepam (RESTORIL) 30 mg capsule, Take 1 capsule by mouth, Disp: , Rfl:     zolpidem (AMBIEN) 10 mg tablet, , Disp: , Rfl:     Blood Glucose Monitoring Suppl (ONE TOUCH ULTRA MINI) w/Device KIT, by Does not apply route daily for 30 days PT. TESTING ONCE DAILY DX:E11.9, Disp: 1 each, Rfl: 0    OneTouch Delica Lancets 31B MISC, Use in the morning, Disp: 90 each, Rfl: 1    Health Maintenance     Health Maintenance   Topic Date Due    COVID-19 Vaccine (3 - Moderna series) 05/08/2021    Kidney Health Evaluation: Albumin/Creatinine Ratio  02/16/2024    Fall Risk  02/23/2024    Medicare Annual Wellness Visit (AWV)  02/23/2024    BMI: Followup Plan  02/23/2024    Diabetic Foot Exam  02/23/2024    HEMOGLOBIN A1C  04/14/2024    Depression Remission PHQ  04/19/2024    Kidney Health Evaluation: GFR  10/14/2024    BMI: Adult  10/19/2024    DM Eye Exam  02/23/2025    Colorectal Cancer Screening  06/10/2025    Hepatitis C Screening  Completed    Pneumococcal Vaccine: 65+ Years  Completed    Influenza Vaccine  Completed    HIB Vaccine  Aged Out    IPV Vaccine  Aged Out    Hepatitis A Vaccine  Aged Out    Meningococcal ACWY Vaccine  Aged Out    HPV Vaccine  Aged Out     Immunization History   Administered Date(s) Administered    COVID-19 MODERNA VACC 0.5 ML IM 02/13/2021, 03/13/2021    INFLUENZA 10/28/2016, 10/24/2018    Influenza Quadrivalent, 6-35 Months IM 10/06/2014, 10/28/2016, 09/08/2017    Influenza, high dose seasonal 0.7 mL 09/16/2019, 10/17/2020, 10/19/2023    Influenza, recombinant, quadrivalent,injectable, preservative free 10/24/2018    Influenza, seasonal, injectable 10/21/2013, 10/06/2015    Pneumococcal Conjugate 13-Valent 07/30/2021    Pneumococcal Conjugate Vaccine 20-valent (Pcv20), Polysace 11/01/2022    Pneumococcal Polysaccharide PPV23 06/04/2014, 02/27/2017    Tdap 1955    Zoster 01/01/2014    Zoster Vaccine Recombinant 01/01/2014         MD [facility] MEDIC[facility] AT [facility] [place]

## 2023-12-31 DIAGNOSIS — E11.9 TYPE 2 DIABETES MELLITUS WITHOUT COMPLICATION, WITHOUT LONG-TERM CURRENT USE OF INSULIN (HCC): ICD-10-CM

## 2024-02-20 ENCOUNTER — OFFICE VISIT (OUTPATIENT)
Age: 69
End: 2024-02-20
Payer: MEDICARE

## 2024-02-20 VITALS
WEIGHT: 164 LBS | OXYGEN SATURATION: 95 % | HEART RATE: 54 BPM | SYSTOLIC BLOOD PRESSURE: 134 MMHG | DIASTOLIC BLOOD PRESSURE: 72 MMHG | TEMPERATURE: 97.5 F | RESPIRATION RATE: 16 BRPM | HEIGHT: 65 IN | BODY MASS INDEX: 27.32 KG/M2

## 2024-02-20 DIAGNOSIS — J06.9 URI, ACUTE: ICD-10-CM

## 2024-02-20 DIAGNOSIS — J44.9 CHRONIC OBSTRUCTIVE PULMONARY DISEASE, UNSPECIFIED COPD TYPE (HCC): Primary | ICD-10-CM

## 2024-02-20 PROCEDURE — 99214 OFFICE O/P EST MOD 30 MIN: CPT | Performed by: FAMILY MEDICINE

## 2024-02-20 RX ORDER — IPRATROPIUM BROMIDE 21 UG/1
2 SPRAY, METERED NASAL EVERY 12 HOURS
Qty: 30 ML | Refills: 0 | Status: SHIPPED | OUTPATIENT
Start: 2024-02-20

## 2024-02-20 RX ORDER — BENZONATATE 100 MG/1
100 CAPSULE ORAL 3 TIMES DAILY PRN
Qty: 20 CAPSULE | Refills: 0 | Status: SHIPPED | OUTPATIENT
Start: 2024-02-20

## 2024-02-20 NOTE — ASSESSMENT & PLAN NOTE
Symbicort 160-4.5 mcg inhaler one puff two times a day. Using albuterol almost three times a day for the past several days.     Persistent cough   Lung exam unremarkable, no decreased breath sounds, Rales, wheezing.  Continue Symbicort and as needed albuterol  Consider increasing Symbicort dosage for better control of COPD  Consider evaluation for lisinopril side effects, based on history lingering dry cough may benefit from transition to losartan.  Symptomatic management, refer to upper respiratory problem list.  Return parameters discussed  Has scheduled appointment with PCP.

## 2024-02-20 NOTE — ASSESSMENT & PLAN NOTE
Recurrent cough denies any fevers or chills has been requiring albuterol more often.  Has not noticed significant improvement with the albuterol usage.    Exam significant for erythema of the pharynx.  No tonsil swelling or exudates.  Tympanic membrane normal.  Ear canal normal.    Likely upper respiratory infection  Provided symptomatic management including Tessalon Perles, Atrovent nasal spray  Discussed pain management with Tylenol, schedule the next couple days  Discussed importance of diaphragm breathing to decrease risk of pneumonia  Consider evaluation for lisinopril side effects, based on history lingering dry cough may benefit from transition to losartan.  Consider increasing dosage of Symbicort   Has scheduled appointment with PCP  Sooner return parameters discussed.

## 2024-02-20 NOTE — PROGRESS NOTES
[facility]  [address] [place]    Name: Ken Jacques      YOB: 1955      MRN: [id number]  Encounter Provider: Antelmo Tong MD      Encounter Date: 02/20/24      Encounter Dept: [facility]    Assessment & Plan     1. Chronic obstructive pulmonary disease, unspecified COPD type (HCC)  Assessment & Plan:  Symbicort 160-4.5 mcg inhaler one puff two times a day. Using albuterol almost three times a day for the past several days.     Persistent cough   Lung exam unremarkable, no decreased breath sounds, Rales, wheezing.  Continue Symbicort and as needed albuterol  Consider increasing Symbicort dosage for better control of COPD  Consider evaluation for lisinopril side effects, based on history lingering dry cough may benefit from transition to losartan.  Symptomatic management, refer to upper respiratory problem list.  Return parameters discussed  Has scheduled appointment with PCP.      2. URI, acute  Assessment & Plan:  Recurrent cough denies any fevers or chills has been requiring albuterol more often.  Has not noticed significant improvement with the albuterol usage.    Exam significant for erythema of the pharynx.  No tonsil swelling or exudates.  Tympanic membrane normal.  Ear canal normal.    Likely upper respiratory infection  Provided symptomatic management including Tessalon Perles, Atrovent nasal spray  Discussed pain management with Tylenol, schedule the next couple days  Discussed importance of diaphragm breathing to decrease risk of pneumonia  Consider evaluation for lisinopril side effects, based on history lingering dry cough may benefit from transition to losartan.  Consider increasing dosage of Symbicort   Has scheduled appointment with PCP  Sooner return parameters discussed.    Orders:  -     benzonatate (TESSALON PERLES) 100 mg capsule; Take 1 capsule (100 mg total) by mouth 3  (three) times a day as needed for cough  -     ipratropium (ATROVENT) 0.03 % nasal spray; 2 sprays into each nostril every 12 (twelve) hours                 Follow-Up Plans   Return if symptoms worsen or fail to improve.      Subjective   Ken Jacques is a 68 y.o. with  has a past medical history of Abnormal EKG, Alcohol abuse, Allergic rhinitis, Anxiety, Asthma, Atresia of esophagus with tracheo-esophageal fistula, Benign neoplasm of colon, Campylobacter enteritis, Cardiac arrhythmia, Chronic cough, Chronic fatigue syndrome, Chronic sinusitis, COPD (chronic obstructive pulmonary disease) (HCC), Diabetes mellitus (HCC), Digestive symptom, EKG, abnormal, Epistaxis, Generalized osteoarthritis, GERD without esophagitis (5/14/2014), Heart disease, HTN (hypertension), Hyperlipidemia, Myalgia, Myositis, Poisoning by drug or medicinal substance, and Sleep apnea.      Cough  Associated symptoms include postnasal drip. Pertinent negatives include no chills, fever or wheezing.         Review of Systems   Constitutional:  Negative for chills and fever.   HENT:  Positive for postnasal drip.    Respiratory:  Positive for cough. Negative for wheezing.          Current Medication List     Current Outpatient Medications:     albuterol (PROVENTIL HFA,VENTOLIN HFA) 90 mcg/act inhaler, TAKE 2 PUFFS BY MOUTH EVERY 4 HOURS AS NEEDED FOR WHEEZE, Disp: 18 g, Rfl: 5    aspirin (ECOTRIN LOW STRENGTH) 81 mg EC tablet, Take 1 tablet by mouth daily, Disp: , Rfl:     atorvastatin (LIPITOR) 10 mg tablet, TAKE 1 TABLET BY MOUTH EVERY DAY, Disp: 90 tablet, Rfl: 2    benzonatate (TESSALON PERLES) 100 mg capsule, Take 1 capsule (100 mg total) by mouth 3 (three) times a day as needed for cough, Disp: 20 capsule, Rfl: 0    budesonide-formoterol (Symbicort) 160-4.5 mcg/act inhaler, Inhale 1 puff 2 (two) times a day, Disp: 10.2 g, Rfl: 5    busPIRone (BUSPAR) 15 mg tablet, TAKE 1 TABLET BY MOUTH TWICE DAILY FOR 90 DAYS, Disp: , Rfl:     diltiazem  "(CARDIZEM CD) 180 mg 24 hr capsule, TAKE 1 CAPSULE BY MOUTH EVERY DAY, Disp: 90 capsule, Rfl: 1    FLUoxetine (PROzac) 40 MG capsule, Take 1 capsule by mouth daily, Disp: , Rfl:     glucose blood (OneTouch Ultra) test strip, Test once daily E11.9, Disp: 100 each, Rfl: 3    ipratropium (ATROVENT) 0.03 % nasal spray, 2 sprays into each nostril every 12 (twelve) hours, Disp: 30 mL, Rfl: 0    lisinopril (ZESTRIL) 20 mg tablet, TAKE 1 TABLET BY MOUTH EVERY DAY, Disp: 90 tablet, Rfl: 3    metFORMIN (GLUCOPHAGE) 500 mg tablet, TAKE 1 TABLET BY MOUTH TWICE A DAY, Disp: 180 tablet, Rfl: 2    ONETOUCH DELICA LANCETS FINE MISC, by Does not apply route, Disp: , Rfl:     temazepam (RESTORIL) 30 mg capsule, Take 1 capsule by mouth, Disp: , Rfl:     zolpidem (AMBIEN) 10 mg tablet, , Disp: , Rfl:     Blood Glucose Monitoring Suppl (ONE TOUCH ULTRA MINI) w/Device KIT, by Does not apply route daily for 30 days PT. TESTING ONCE DAILY DX:E11.9, Disp: 1 each, Rfl: 0    ciclopirox (PENLAC) 8 % solution, Apply topically daily at bedtime (Patient not taking: Reported on 2/20/2024), Disp: 6 mL, Rfl: 3    OneTouch Delica Lancets 33G MISC, Use in the morning, Disp: 90 each, Rfl: 1      Objective     /72 (BP Location: Left arm, Patient Position: Sitting, Cuff Size: Standard)   Pulse (!) 54   Temp 97.5 °F (36.4 °C) (Tympanic)   Resp 16   Ht 5' 5\" (1.651 m)   Wt 74.4 kg (164 lb)   SpO2 95%   BMI 27.29 kg/m²      Physical Exam  Vitals reviewed.   Constitutional:       General: He is not in acute distress.     Appearance: Normal appearance. He is not ill-appearing, toxic-appearing or diaphoretic.   HENT:      Head: Normocephalic and atraumatic.      Right Ear: External ear normal.      Left Ear: External ear normal.      Nose: Nose normal.      Mouth/Throat:      Mouth: Mucous membranes are moist.   Eyes:      General: No scleral icterus.        Right eye: No discharge.         Left eye: No discharge.      Conjunctiva/sclera: " Conjunctivae normal.   Cardiovascular:      Rate and Rhythm: Tachycardia present.   Pulmonary:      Effort: Pulmonary effort is normal. No respiratory distress.      Breath sounds: No wheezing or rales.   Skin:     General: Skin is warm.   Neurological:      General: No focal deficit present.      Mental Status: He is alert and oriented to person, place, and time.   Psychiatric:         Mood and Affect: Mood normal.         Behavior: Behavior normal.         Thought Content: Thought content normal.           Antelmo Tong MD  Family Medicine Physician   [facility] [place]

## 2024-02-21 PROBLEM — J06.9 URI, ACUTE: Status: RESOLVED | Noted: 2024-02-20 | Resolved: 2024-02-21

## 2024-03-15 ENCOUNTER — APPOINTMENT (OUTPATIENT)
Age: 69
End: 2024-03-15
Payer: MEDICARE

## 2024-03-15 DIAGNOSIS — E11.9 TYPE 2 DIABETES MELLITUS WITHOUT COMPLICATION, WITHOUT LONG-TERM CURRENT USE OF INSULIN (HCC): ICD-10-CM

## 2024-03-15 LAB
ALBUMIN SERPL BCP-MCNC: 4.2 G/DL (ref 3.5–5)
ALP SERPL-CCNC: 69 U/L (ref 34–104)
ALT SERPL W P-5'-P-CCNC: 10 U/L (ref 7–52)
ANION GAP SERPL CALCULATED.3IONS-SCNC: 10 MMOL/L (ref 4–13)
AST SERPL W P-5'-P-CCNC: 21 U/L (ref 13–39)
BASOPHILS # BLD AUTO: 0.05 THOUSANDS/ÂΜL (ref 0–0.1)
BASOPHILS NFR BLD AUTO: 1 % (ref 0–1)
BILIRUB SERPL-MCNC: 0.65 MG/DL (ref 0.2–1)
BUN SERPL-MCNC: 11 MG/DL (ref 5–25)
CALCIUM SERPL-MCNC: 9 MG/DL (ref 8.4–10.2)
CHLORIDE SERPL-SCNC: 104 MMOL/L (ref 96–108)
CHOLEST SERPL-MCNC: 166 MG/DL
CO2 SERPL-SCNC: 26 MMOL/L (ref 21–32)
CREAT SERPL-MCNC: 0.83 MG/DL (ref 0.6–1.3)
CREAT UR-MCNC: 94.5 MG/DL
EOSINOPHIL # BLD AUTO: 0.42 THOUSAND/ÂΜL (ref 0–0.61)
EOSINOPHIL NFR BLD AUTO: 5 % (ref 0–6)
ERYTHROCYTE [DISTWIDTH] IN BLOOD BY AUTOMATED COUNT: 13 % (ref 11.6–15.1)
EST. AVERAGE GLUCOSE BLD GHB EST-MCNC: 126 MG/DL
GFR SERPL CREATININE-BSD FRML MDRD: 89 ML/MIN/1.73SQ M
GLUCOSE P FAST SERPL-MCNC: 131 MG/DL (ref 65–99)
HBA1C MFR BLD: 6 %
HCT VFR BLD AUTO: 47 % (ref 36.5–49.3)
HDLC SERPL-MCNC: 70 MG/DL
HGB BLD-MCNC: 14.7 G/DL (ref 12–17)
IMM GRANULOCYTES # BLD AUTO: 0.01 THOUSAND/UL (ref 0–0.2)
IMM GRANULOCYTES NFR BLD AUTO: 0 % (ref 0–2)
LDLC SERPL CALC-MCNC: 54 MG/DL (ref 0–100)
LYMPHOCYTES # BLD AUTO: 3.14 THOUSANDS/ÂΜL (ref 0.6–4.47)
LYMPHOCYTES NFR BLD AUTO: 38 % (ref 14–44)
MCH RBC QN AUTO: 27.6 PG (ref 26.8–34.3)
MCHC RBC AUTO-ENTMCNC: 31.3 G/DL (ref 31.4–37.4)
MCV RBC AUTO: 88 FL (ref 82–98)
MICROALBUMIN UR-MCNC: <7 MG/L
MICROALBUMIN/CREAT 24H UR: <7 MG/G CREATININE (ref 0–30)
MONOCYTES # BLD AUTO: 0.73 THOUSAND/ÂΜL (ref 0.17–1.22)
MONOCYTES NFR BLD AUTO: 9 % (ref 4–12)
NEUTROPHILS # BLD AUTO: 3.96 THOUSANDS/ÂΜL (ref 1.85–7.62)
NEUTS SEG NFR BLD AUTO: 47 % (ref 43–75)
NRBC BLD AUTO-RTO: 0 /100 WBCS
PLATELET # BLD AUTO: 291 THOUSANDS/UL (ref 149–390)
PMV BLD AUTO: 11 FL (ref 8.9–12.7)
POTASSIUM SERPL-SCNC: 3.7 MMOL/L (ref 3.5–5.3)
PROT SERPL-MCNC: 6.9 G/DL (ref 6.4–8.4)
RBC # BLD AUTO: 5.32 MILLION/UL (ref 3.88–5.62)
SODIUM SERPL-SCNC: 140 MMOL/L (ref 135–147)
TRIGL SERPL-MCNC: 209 MG/DL
TSH SERPL DL<=0.05 MIU/L-ACNC: 1.79 UIU/ML (ref 0.45–4.5)
WBC # BLD AUTO: 8.31 THOUSAND/UL (ref 4.31–10.16)

## 2024-03-15 PROCEDURE — 80061 LIPID PANEL: CPT

## 2024-03-15 PROCEDURE — 80053 COMPREHEN METABOLIC PANEL: CPT

## 2024-03-15 PROCEDURE — 85025 COMPLETE CBC W/AUTO DIFF WBC: CPT

## 2024-03-15 PROCEDURE — 82570 ASSAY OF URINE CREATININE: CPT

## 2024-03-15 PROCEDURE — 82043 UR ALBUMIN QUANTITATIVE: CPT

## 2024-03-15 PROCEDURE — 36415 COLL VENOUS BLD VENIPUNCTURE: CPT

## 2024-03-15 PROCEDURE — 84443 ASSAY THYROID STIM HORMONE: CPT

## 2024-03-15 PROCEDURE — 83036 HEMOGLOBIN GLYCOSYLATED A1C: CPT

## 2024-03-21 ENCOUNTER — OFFICE VISIT (OUTPATIENT)
Age: 69
End: 2024-03-21
Payer: MEDICARE

## 2024-03-21 VITALS
BODY MASS INDEX: 25.99 KG/M2 | TEMPERATURE: 98.6 F | HEIGHT: 65 IN | OXYGEN SATURATION: 99 % | RESPIRATION RATE: 18 BRPM | DIASTOLIC BLOOD PRESSURE: 74 MMHG | SYSTOLIC BLOOD PRESSURE: 122 MMHG | WEIGHT: 156 LBS | HEART RATE: 80 BPM

## 2024-03-21 DIAGNOSIS — E78.2 MIXED HYPERLIPIDEMIA: ICD-10-CM

## 2024-03-21 DIAGNOSIS — Z00.00 MEDICARE ANNUAL WELLNESS VISIT, SUBSEQUENT: ICD-10-CM

## 2024-03-21 DIAGNOSIS — F33.41 RECURRENT MAJOR DEPRESSIVE DISORDER, IN PARTIAL REMISSION (HCC): ICD-10-CM

## 2024-03-21 DIAGNOSIS — N18.31 STAGE 3A CHRONIC KIDNEY DISEASE (HCC): ICD-10-CM

## 2024-03-21 DIAGNOSIS — E11.9 TYPE 2 DIABETES MELLITUS WITHOUT COMPLICATION, WITHOUT LONG-TERM CURRENT USE OF INSULIN (HCC): Primary | ICD-10-CM

## 2024-03-21 DIAGNOSIS — R35.0 URINE FREQUENCY: ICD-10-CM

## 2024-03-21 DIAGNOSIS — J44.9 CHRONIC OBSTRUCTIVE PULMONARY DISEASE, UNSPECIFIED COPD TYPE (HCC): ICD-10-CM

## 2024-03-21 DIAGNOSIS — F41.1 GENERALIZED ANXIETY DISORDER: ICD-10-CM

## 2024-03-21 DIAGNOSIS — I10 ESSENTIAL HYPERTENSION: ICD-10-CM

## 2024-03-21 PROCEDURE — G0439 PPPS, SUBSEQ VISIT: HCPCS | Performed by: INTERNAL MEDICINE

## 2024-03-21 PROCEDURE — 99214 OFFICE O/P EST MOD 30 MIN: CPT | Performed by: INTERNAL MEDICINE

## 2024-03-21 RX ORDER — OLMESARTAN MEDOXOMIL 40 MG/1
40 TABLET ORAL DAILY
Qty: 90 TABLET | Refills: 3 | Status: SHIPPED | OUTPATIENT
Start: 2024-03-21

## 2024-03-21 NOTE — PATIENT INSTRUCTIONS
Medicare Preventive Visit Patient Instructions  Thank you for completing your Welcome to Medicare Visit or Medicare Annual Wellness Visit today. Your next wellness visit will be due in one year (3/22/2025).  The screening/preventive services that you may require over the next 5-10 years are detailed below. Some tests may not apply to you based off risk factors and/or age. Screening tests ordered at today's visit but not completed yet may show as past due. Also, please note that scanned in results may not display below.  Preventive Screenings:  Service Recommendations Previous Testing/Comments   Colorectal Cancer Screening  Colonoscopy    Fecal Occult Blood Test (FOBT)/Fecal Immunochemical Test (FIT)  Fecal DNA/Cologuard Test  Flexible Sigmoidoscopy Age: 45-75 years old   Colonoscopy: every 10 years (May be performed more frequently if at higher risk)  OR  FOBT/FIT: every 1 year  OR  Cologuard: every 3 years  OR  Sigmoidoscopy: every 5 years  Screening may be recommended earlier than age 45 if at higher risk for colorectal cancer. Also, an individualized decision between you and your healthcare provider will decide whether screening between the ages of 76-85 would be appropriate. Colonoscopy: 06/10/2015  FOBT/FIT: Not on file  Cologuard: Not on file  Sigmoidoscopy: Not on file    Screening Current     Prostate Cancer Screening Individualized decision between patient and health care provider in men between ages of 55-69   Medicare will cover every 12 months beginning on the day after your 50th birthday PSA: 3.9 ng/mL           Hepatitis C Screening Once for adults born between 1945 and 1965  More frequently in patients at high risk for Hepatitis C Hep C Antibody: 03/16/2020    Screening Current   Diabetes Screening 1-2 times per year if you're at risk for diabetes or have pre-diabetes Fasting glucose: 131 mg/dL (3/15/2024)  A1C: 6.0 % (3/15/2024)  Screening Not Indicated  History Diabetes   Cholesterol Screening Once  every 5 years if you don't have a lipid disorder. May order more often based on risk factors. Lipid panel: 03/15/2024  Screening Not Indicated  History Lipid Disorder      Other Preventive Screenings Covered by Medicare:  Abdominal Aortic Aneurysm (AAA) Screening: covered once if your at risk. You're considered to be at risk if you have a family history of AAA or a male between the age of 65-75 who smoking at least 100 cigarettes in your lifetime.  Lung Cancer Screening: covers low dose CT scan once per year if you meet all of the following conditions: (1) Age 55-77; (2) No signs or symptoms of lung cancer; (3) Current smoker or have quit smoking within the last 15 years; (4) You have a tobacco smoking history of at least 20 pack years (packs per day x number of years you smoked); (5) You get a written order from a healthcare provider.  Glaucoma Screening: covered annually if you're considered high risk: (1) You have diabetes OR (2) Family history of glaucoma OR (3)  aged 50 and older OR (4)  American aged 65 and older  Osteoporosis Screening: covered every 2 years if you meet one of the following conditions: (1) Have a vertebral abnormality; (2) On glucocorticoid therapy for more than 3 months; (3) Have primary hyperparathyroidism; (4) On osteoporosis medications and need to assess response to drug therapy.  HIV Screening: covered annually if you're between the age of 15-65. Also covered annually if you are younger than 15 and older than 65 with risk factors for HIV infection. For pregnant patients, it is covered up to 3 times per pregnancy.    Immunizations:  Immunization Recommendations   Influenza Vaccine Annual influenza vaccination during flu season is recommended for all persons aged >= 6 months who do not have contraindications   Pneumococcal Vaccine   * Pneumococcal conjugate vaccine = PCV13 (Prevnar 13), PCV15 (Vaxneuvance), PCV20 (Prevnar 20)  * Pneumococcal polysaccharide vaccine  = PPSV23 (Pneumovax) Adults 19-65 yo with certain risk factors or if 65+ yo  If never received any pneumonia vaccine: recommend Prevnar 20 (PCV20)  Give PCV20 if previously received 1 dose of PCV13 or PPSV23   Hepatitis B Vaccine 3 dose series if at intermediate or high risk (ex: diabetes, end stage renal disease, liver disease)   Respiratory syncytial virus (RSV) Vaccine - COVERED BY MEDICARE PART D  * RSVPreF3 (Arexvy) CDC recommends that adults 60 years of age and older may receive a single dose of RSV vaccine using shared clinical decision-making (SCDM)   Tetanus (Td) Vaccine - COST NOT COVERED BY MEDICARE PART B Following completion of primary series, a booster dose should be given every 10 years to maintain immunity against tetanus. Td may also be given as tetanus wound prophylaxis.   Tdap Vaccine - COST NOT COVERED BY MEDICARE PART B Recommended at least once for all adults. For pregnant patients, recommended with each pregnancy.   Shingles Vaccine (Shingrix) - COST NOT COVERED BY MEDICARE PART B  2 shot series recommended in those 19 years and older who have or will have weakened immune systems or those 50 years and older     Health Maintenance Due:      Topic Date Due   • Colorectal Cancer Screening  06/10/2025   • Hepatitis C Screening  Completed     Immunizations Due:      Topic Date Due   • COVID-19 Vaccine (4 - 2023-24 season) 02/29/2024     Advance Directives   What are advance directives?  Advance directives are legal documents that state your wishes and plans for medical care. These plans are made ahead of time in case you lose your ability to make decisions for yourself. Advance directives can apply to any medical decision, such as the treatments you want, and if you want to donate organs.   What are the types of advance directives?  There are many types of advance directives, and each state has rules about how to use them. You may choose a combination of any of the following:  Living will:  This is  a written record of the treatment you want. You can also choose which treatments you do not want, which to limit, and which to stop at a certain time. This includes surgery, medicine, IV fluid, and tube feedings.   Durable power of  for healthcare (DPAHC):  This is a written record that states who you want to make healthcare choices for you when you are unable to make them for yourself. This person, called a proxy, is usually a family member or a friend. You may choose more than 1 proxy.  Do not resuscitate (DNR) order:  A DNR order is used in case your heart stops beating or you stop breathing. It is a request not to have certain forms of treatment, such as CPR. A DNR order may be included in other types of advance directives.  Medical directive:  This covers the care that you want if you are in a coma, near death, or unable to make decisions for yourself. You can list the treatments you want for each condition. Treatment may include pain medicine, surgery, blood transfusions, dialysis, IV or tube feedings, and a ventilator (breathing machine).  Values history:  This document has questions about your views, beliefs, and how you feel and think about life. This information can help others choose the care that you would choose.  Why are advance directives important?  An advance directive helps you control your care. Although spoken wishes may be used, it is better to have your wishes written down. Spoken wishes can be misunderstood, or not followed. Treatments may be given even if you do not want them. An advance directive may make it easier for your family to make difficult choices about your care.   Weight Management   Why it is important to manage your weight:  Being overweight increases your risk of health conditions such as heart disease, high blood pressure, type 2 diabetes, and certain types of cancer. It can also increase your risk for osteoarthritis, sleep apnea, and other respiratory problems. Aim  for a slow, steady weight loss. Even a small amount of weight loss can lower your risk of health problems.  How to lose weight safely:  A safe and healthy way to lose weight is to eat fewer calories and get regular exercise. You can lose up about 1 pound a week by decreasing the number of calories you eat by 500 calories each day.   Healthy meal plan for weight management:  A healthy meal plan includes a variety of foods, contains fewer calories, and helps you stay healthy. A healthy meal plan includes the following:  Eat whole-grain foods more often.  A healthy meal plan should contain fiber. Fiber is the part of grains, fruits, and vegetables that is not broken down by your body. Whole-grain foods are healthy and provide extra fiber in your diet. Some examples of whole-grain foods are whole-wheat breads and pastas, oatmeal, brown rice, and bulgur.  Eat a variety of vegetables every day.  Include dark, leafy greens such as spinach, kale, kailey greens, and mustard greens. Eat yellow and orange vegetables such as carrots, sweet potatoes, and winter squash.   Eat a variety of fruits every day.  Choose fresh or canned fruit (canned in its own juice or light syrup) instead of juice. Fruit juice has very little or no fiber.  Eat low-fat dairy foods.  Drink fat-free (skim) milk or 1% milk. Eat fat-free yogurt and low-fat cottage cheese. Try low-fat cheeses such as mozzarella and other reduced-fat cheeses.  Choose meat and other protein foods that are low in fat.  Choose beans or other legumes such as split peas or lentils. Choose fish, skinless poultry (chicken or turkey), or lean cuts of red meat (beef or pork). Before you cook meat or poultry, cut off any visible fat.   Use less fat and oil.  Try baking foods instead of frying them. Add less fat, such as margarine, sour cream, regular salad dressing and mayonnaise to foods. Eat fewer high-fat foods. Some examples of high-fat foods include french fries, doughnuts, ice  cream, and cakes.  Eat fewer sweets.  Limit foods and drinks that are high in sugar. This includes candy, cookies, regular soda, and sweetened drinks.  Exercise:  Exercise at least 30 minutes per day on most days of the week. Some examples of exercise include walking, biking, dancing, and swimming. You can also fit in more physical activity by taking the stairs instead of the elevator or parking farther away from stores. Ask your healthcare provider about the best exercise plan for you.      © Copyright [employer] 2018 Information is for End User's use only and may not be sold, redistributed or otherwise used for commercial purposes. All illustrations and images included in CareNotes® are the copyrighted property of [employer] or [employer]

## 2024-03-21 NOTE — PROGRESS NOTES
Assessment and Plan:   Type 2 diabetes is fairly well-controlled, continue the same medication  As patient has chronic cough for a long time, will try to discontinue the lisinopril and start him on olmesartan instead to rule out ACE inhibitor induced dry cough.  He was also advised to use the inhalers regularly as he has a history of COPD.  Blood pressure is otherwise stable on the present medications and he takes the atorvastatin regularly as well  Does have symptoms of depression and anxiety and is taking his medications regularly.  The medications do not control the symptoms fully but he was advised to continue taking them.  Does have some urinary frequency and was told to see the urologist.      Problem List Items Addressed This Visit          Endocrine    Type 2 diabetes mellitus, without long-term current use of insulin ([facility]) - Primary       Depression Screening and Follow-up Plan: Patient's depression screening was positive with a PHQ-9 score of 8. Patient assessed for underlying major depression. Brief counseling provided and recommend additional follow-up/re-evaluation next office visit.       Preventive health issues were discussed with patient, and age appropriate screening tests were ordered as noted in patient's After Visit Summary.  Personalized health advice and appropriate referrals for health education or preventive services given if needed, as noted in patient's After Visit Summary.     History of Present Illness:     Patient presents for a Medicare Wellness Visit    HPI  Patient is here for follow-up of type 2 diabetes, hypertension, hyperlipidemia, COPD, chronic kidney disease, depression and anxiety.  Continues to be depressed despite taking medication.  Also has urinary frequency and was advised to see the urologist.  Has been taking all his medications regularly      Patient Care Team:  Soraya Ivy MD as PCP - General  Moise Mazariegos III, MD (Gastroenterology)     Review of Systems:      Review of Systems   Constitutional:  Negative for chills, diaphoresis, fatigue and fever.   HENT:  Negative for congestion, ear discharge, ear pain, hearing loss, postnasal drip, rhinorrhea, sinus pressure, sinus pain, sneezing, sore throat and voice change.    Eyes:  Negative for pain, discharge, redness and visual disturbance.   Respiratory:  Negative for cough, chest tightness, shortness of breath and wheezing.    Cardiovascular:  Negative for chest pain, palpitations and leg swelling.   Gastrointestinal:  Negative for abdominal distention, abdominal pain, blood in stool, constipation, diarrhea, nausea and vomiting.   Endocrine: Negative for cold intolerance, heat intolerance, polydipsia, polyphagia and polyuria.   Genitourinary:  Negative for dysuria, flank pain, frequency, hematuria and urgency.   Musculoskeletal:  Negative for arthralgias, back pain, gait problem, joint swelling, myalgias, neck pain and neck stiffness.   Skin:  Negative for rash.   Neurological:  Negative for dizziness, tremors, syncope, facial asymmetry, speech difficulty, weakness, light-headedness, numbness and headaches.   Hematological:  Does not bruise/bleed easily.   Psychiatric/Behavioral:  Negative for behavioral problems, confusion and sleep disturbance. The patient is not nervous/anxious.         Problem List:     Patient Active Problem List   Diagnosis    Generalized anxiety disorder    Cardiac arrhythmia    Chronic obstructive pulmonary disease (HCC)    Recurrent major depressive disorder, in partial remission (HCC)    Diastolic dysfunction    Ebstein anomaly    Mixed hyperlipidemia    Essential hypertension    Hypertensive heart disease    Nonrheumatic pulmonary valve insufficiency    Overweight    Tricuspid valve disorders, non-rheumatic    Type 2 diabetes mellitus, without long-term current use of insulin (HCC)    Esophageal dysphagia      Past Medical and Surgical History:     Past Medical History:   Diagnosis Date     Abnormal EKG     Alcohol abuse     Allergic rhinitis     last assessed: 2014    Anxiety     Asthma     last assesssed: 2014    Atresia of esophagus with tracheo-esophageal fistula     Benign neoplasm of colon     Campylobacter enteritis     last assessed: 2015    Cardiac arrhythmia     last assessed: 2015    Chronic cough     last assessed: 2015    Chronic fatigue syndrome     resolved: 2014    Chronic sinusitis     resolved: 2014    COPD (chronic obstructive pulmonary disease) (HCC)     Diabetes mellitus (HCC)     Digestive symptom     EKG, abnormal     Epistaxis     Generalized osteoarthritis     resolved: 2014    GERD without esophagitis 2014    Heart disease     HTN (hypertension)     Hyperlipidemia     Myalgia     Myositis     Poisoning by drug or medicinal substance     Sleep apnea      Past Surgical History:   Procedure Laterality Date    COLONOSCOPY      ESOPHAGOGASTRODUODENOSCOPY N/A 10/2/2018    Procedure: ESOPHAGOGASTRODUODENOSCOPY (EGD);  Surgeon: Joycelyn Lainez MD;  Location: [facility];  Service: Gastroenterology    FOOT SURGERY      AZ ESOPHAGOGASTRODUODENOSCOPY TRANSORAL DIAGNOSTIC N/A 2018    Procedure: ESOPHAGOGASTRODUODENOSCOPY (EGD);  Surgeon: Moise Mazariegos III, MD;  Location: [facility];  Service: Gastroenterology    TONSILLECTOMY        Family History:     Family History   Problem Relation Age of Onset    Asthma Mother       Social History:     Social History     Socioeconomic History    Marital status: Single     Spouse name: None    Number of children: None    Years of education: None    Highest education level: None   Occupational History    None   Tobacco Use    Smoking status: Former     Current packs/day: 0.00     Types: Cigarettes     Quit date: 10/2/2006     Years since quittin.4    Smokeless tobacco: Never    Tobacco comments:     cigars-social   Vaping Use    Vaping status: Never Used   Substance and Sexual Activity    Alcohol use:  No     Comment: rarely    Drug use: No    Sexual activity: Yes     Partners: Female   Other Topics Concern    None   Social History Narrative    None     Social Determinants of Health     Financial Resource Strain: Low Risk  (2/23/2023)    Overall Financial Resource Strain (CARDIA)     Difficulty of Paying Living Expenses: Not hard at all   Food Insecurity: Not on file   Transportation Needs: No Transportation Needs (2/23/2023)    PRAPARE - Transportation     Lack of Transportation (Medical): No     Lack of Transportation (Non-Medical): No   Physical Activity: Insufficiently Active (5/21/2021)    Exercise Vital Sign     Days of Exercise per Week: 3 days     Minutes of Exercise per Session: 40 min   Stress: No Stress Concern Present (5/21/2021)    [employer] - Occupational Stress Questionnaire     Feeling of Stress : Not at all   Social Connections: Not on file   Intimate Partner Violence: Not on file   Housing Stability: Not on file      Medications and Allergies:     Current Outpatient Medications   Medication Sig Dispense Refill    albuterol (PROVENTIL HFA,VENTOLIN HFA) 90 mcg/act inhaler TAKE 2 PUFFS BY MOUTH EVERY 4 HOURS AS NEEDED FOR WHEEZE 18 g 5    aspirin (ECOTRIN LOW STRENGTH) 81 mg EC tablet Take 1 tablet by mouth daily      atorvastatin (LIPITOR) 10 mg tablet TAKE 1 TABLET BY MOUTH EVERY DAY 90 tablet 2    benzonatate (TESSALON PERLES) 100 mg capsule Take 1 capsule (100 mg total) by mouth 3 (three) times a day as needed for cough 20 capsule 0    budesonide-formoterol (Symbicort) 160-4.5 mcg/act inhaler Inhale 1 puff 2 (two) times a day 10.2 g 5    busPIRone (BUSPAR) 15 mg tablet TAKE 1 TABLET BY MOUTH TWICE DAILY FOR 90 DAYS      diltiazem (CARDIZEM CD) 180 mg 24 hr capsule TAKE 1 CAPSULE BY MOUTH EVERY DAY 90 capsule 1    FLUoxetine (PROzac) 40 MG capsule Take 1 capsule by mouth daily      glucose blood (OneTouch Ultra) test strip Test once daily E11.9 100 each 3     ipratropium (ATROVENT) 0.03 % nasal spray 2 sprays into each nostril every 12 (twelve) hours 30 mL 0    lisinopril (ZESTRIL) 20 mg tablet TAKE 1 TABLET BY MOUTH EVERY DAY 90 tablet 3    metFORMIN (GLUCOPHAGE) 500 mg tablet TAKE 1 TABLET BY MOUTH TWICE A  tablet 2    ONETOUCH DELICA LANCETS FINE MISC by Does not apply route      temazepam (RESTORIL) 30 mg capsule Take 1 capsule by mouth      zolpidem (AMBIEN) 10 mg tablet       Blood Glucose Monitoring Suppl (ONE TOUCH ULTRA MINI) w/Device KIT by Does not apply route daily for 30 days PT. TESTING ONCE DAILY DX:E11.9 1 each 0    ciclopirox (PENLAC) 8 % solution Apply topically daily at bedtime (Patient not taking: Reported on 2/20/2024) 6 mL 3    OneTouch Delica Lancets 33G MISC Use in the morning 90 each 1     No current facility-administered medications for this visit.     No Known Allergies   Immunizations:     Immunization History   Administered Date(s) Administered    COVID-19 MODERNA VACC 0.5 ML IM 02/13/2021, 03/13/2021    COVID-19 Pfizer mRNA vacc PF jarred-sucrose 12 yr and older (Comirnaty) 01/04/2024    INFLUENZA 10/28/2016, 10/24/2018    Influenza Quadrivalent, 6-35 Months IM 10/06/2014, 10/28/2016, 09/08/2017    Influenza, high dose seasonal 0.7 mL 09/16/2019, 10/17/2020, 10/19/2023    Influenza, recombinant, quadrivalent,injectable, preservative free 10/24/2018    Influenza, seasonal, injectable 10/21/2013, 10/06/2015    Pneumococcal Conjugate 13-Valent 07/30/2021    Pneumococcal Conjugate Vaccine 20-valent (Pcv20), Polysace 11/01/2022    Pneumococcal Polysaccharide PPV23 06/04/2014, 02/27/2017    Tdap 1955    Zoster 01/01/2014    Zoster Vaccine Recombinant 01/01/2014      Health Maintenance:         Topic Date Due    Colorectal Cancer Screening  06/10/2025    Hepatitis C Screening  Completed         Topic Date Due    COVID-19 Vaccine (4 - 2023-24 season) 02/29/2024      Medicare Screening Tests and Risk Assessments:     Ken is here for  "his Subsequent Wellness visit. Last Medicare Wellness visit information reviewed, patient interviewed, no change since last AWV.     Depression Screening:   PHQ-9 Score: 8      PREVENTIVE SCREENINGS      Cardiovascular Screening:    General: Screening Not Indicated and History Lipid Disorder      Diabetes Screening:     General: Screening Not Indicated and History Diabetes      Colorectal Cancer Screening:     General: Screening Current      Abdominal Aortic Aneurysm (AAA) Screening:    Risk factors include: age between 65-76 yo and tobacco use        Lung Cancer Screening:     General: Screening Not Indicated      Hepatitis C Screening:    General: Screening Current    No results found.     Physical Exam:     Temp 98.6 °F (37 °C) (Tympanic)   Ht 5' 5\" (1.651 m)   Wt 70.8 kg (156 lb)   BMI 25.96 kg/m²     Physical Exam  Constitutional:       General: He is not in acute distress.     Appearance: He is well-developed. He is not diaphoretic.   HENT:      Head: Normocephalic and atraumatic.      Right Ear: External ear normal.      Left Ear: External ear normal.      Nose: Nose normal.   Eyes:      General: No scleral icterus.        Right eye: No discharge.         Left eye: No discharge.      Conjunctiva/sclera: Conjunctivae normal.   Cardiovascular:      Rate and Rhythm: Normal rate and regular rhythm.      Pulses: no weak pulses.           Dorsalis pedis pulses are 2+ on the right side and 2+ on the left side.        Posterior tibial pulses are 2+ on the right side and 2+ on the left side.      Heart sounds: Normal heart sounds. No murmur heard.     No friction rub. No gallop.   Pulmonary:      Effort: Pulmonary effort is normal. No respiratory distress.      Breath sounds: Normal breath sounds. No wheezing or rales.   Abdominal:      General: Bowel sounds are normal. There is no distension.      Palpations: Abdomen is soft.      Tenderness: There is no abdominal tenderness. There is no guarding or rebound. "   Musculoskeletal:         General: No tenderness.   Feet:      Right foot:      Skin integrity: No ulcer, skin breakdown, erythema, warmth, callus or dry skin.      Left foot:      Skin integrity: No ulcer, skin breakdown, erythema, warmth, callus or dry skin.   Skin:     General: Skin is warm and dry.      Findings: No erythema or rash.   Neurological:      Mental Status: He is alert and oriented to person, place, and time.      Cranial Nerves: No cranial nerve deficit.      Sensory: No sensory deficit.      Motor: No abnormal muscle tone.   Psychiatric:         Behavior: Behavior normal.      Patient's shoes and socks removed.    Right Foot/Ankle   Right Foot Inspection  Skin Exam: skin normal and skin intact. No dry skin, no warmth, no callus, no erythema, no maceration, no abnormal color, no pre-ulcer, no ulcer and no callus.     Toe Exam: ROM and strength within normal limits.     Sensory   Monofilament testing: intact    Vascular  The right DP pulse is 2+. The right PT pulse is 2+.     Left Foot/Ankle  Left Foot Inspection  Skin Exam: skin normal and skin intact. No dry skin, no warmth, no erythema, no maceration, normal color, no pre-ulcer, no ulcer and no callus.     Toe Exam: ROM and strength within normal limits.     Sensory   Monofilament testing: intact    Vascular  The left DP pulse is 2+. The left PT pulse is 2+.     Assign Risk Category  No deformity present  No loss of protective sensation  No weak pulses  Risk: 0       Soraya Ivy MD

## 2024-05-14 DIAGNOSIS — I10 ESSENTIAL HYPERTENSION: ICD-10-CM

## 2024-05-14 DIAGNOSIS — J44.9 CHRONIC OBSTRUCTIVE PULMONARY DISEASE, UNSPECIFIED COPD TYPE (HCC): ICD-10-CM

## 2024-05-14 DIAGNOSIS — J06.9 URI, ACUTE: ICD-10-CM

## 2024-05-14 RX ORDER — ALBUTEROL SULFATE 90 UG/1
AEROSOL, METERED RESPIRATORY (INHALATION)
Qty: 18 G | Refills: 5 | Status: SHIPPED | OUTPATIENT
Start: 2024-05-14

## 2024-05-14 RX ORDER — DILTIAZEM HYDROCHLORIDE 180 MG/1
CAPSULE, COATED, EXTENDED RELEASE ORAL
Qty: 90 CAPSULE | Refills: 1 | Status: SHIPPED | OUTPATIENT
Start: 2024-05-14

## 2024-05-16 RX ORDER — IPRATROPIUM BROMIDE 21 UG/1
2 SPRAY, METERED NASAL EVERY 12 HOURS
Qty: 30 ML | Refills: 0 | Status: SHIPPED | OUTPATIENT
Start: 2024-05-16

## 2024-05-30 DIAGNOSIS — J44.9 COPD MIXED TYPE (HCC): ICD-10-CM

## 2024-05-30 RX ORDER — BUDESONIDE AND FORMOTEROL FUMARATE DIHYDRATE 160; 4.5 UG/1; UG/1
1 AEROSOL RESPIRATORY (INHALATION) 2 TIMES DAILY
Qty: 10.2 G | Refills: 0 | Status: SHIPPED | OUTPATIENT
Start: 2024-05-30 | End: 2024-06-04

## 2024-05-30 NOTE — TELEPHONE ENCOUNTER
Reason for call: pt states pharmacy has not received medication refill   [x] Refill   [] Prior Auth  [] Other:     Office:   [x] PCP/Provider -   [] Specialty/Provider -     Medication:           Does the patient have enough for 3 days?   [] Yes   [x] No - Send as HP to POD

## 2024-06-04 ENCOUNTER — TELEPHONE (OUTPATIENT)
Age: 69
End: 2024-06-04

## 2024-06-04 DIAGNOSIS — J44.9 CHRONIC OBSTRUCTIVE PULMONARY DISEASE, UNSPECIFIED COPD TYPE (HCC): Primary | ICD-10-CM

## 2024-06-04 RX ORDER — FLUTICASONE FUROATE AND VILANTEROL 200; 25 UG/1; UG/1
1 POWDER RESPIRATORY (INHALATION) DAILY
Qty: 60 BLISTER | Refills: 2 | Status: SHIPPED | OUTPATIENT
Start: 2024-06-04

## 2024-06-04 NOTE — TELEPHONE ENCOUNTER
Received alternative med request from [facility].   Budesonide-Formoterol not covered by insurance.

## 2024-06-07 ENCOUNTER — TELEPHONE (OUTPATIENT)
Age: 69
End: 2024-06-07

## 2024-06-07 NOTE — TELEPHONE ENCOUNTER
Patient wanted to know which med to take warm transfer to [facility]

## 2024-07-19 ENCOUNTER — APPOINTMENT (OUTPATIENT)
Age: 69
End: 2024-07-19
Payer: MEDICARE

## 2024-07-19 DIAGNOSIS — E11.9 TYPE 2 DIABETES MELLITUS WITHOUT COMPLICATION, WITHOUT LONG-TERM CURRENT USE OF INSULIN (HCC): ICD-10-CM

## 2024-07-19 LAB
ALBUMIN SERPL BCG-MCNC: 3.8 G/DL (ref 3.5–5)
ALP SERPL-CCNC: 76 U/L (ref 34–104)
ALT SERPL W P-5'-P-CCNC: 10 U/L (ref 7–52)
ANION GAP SERPL CALCULATED.3IONS-SCNC: 9 MMOL/L (ref 4–13)
AST SERPL W P-5'-P-CCNC: 29 U/L (ref 13–39)
BASOPHILS # BLD AUTO: 0.03 THOUSANDS/ÂΜL (ref 0–0.1)
BASOPHILS NFR BLD AUTO: 0 % (ref 0–1)
BILIRUB SERPL-MCNC: 0.57 MG/DL (ref 0.2–1)
BUN SERPL-MCNC: 15 MG/DL (ref 5–25)
CALCIUM SERPL-MCNC: 9.1 MG/DL (ref 8.4–10.2)
CHLORIDE SERPL-SCNC: 103 MMOL/L (ref 96–108)
CHOLEST SERPL-MCNC: 181 MG/DL
CO2 SERPL-SCNC: 28 MMOL/L (ref 21–32)
CREAT SERPL-MCNC: 0.93 MG/DL (ref 0.6–1.3)
EOSINOPHIL # BLD AUTO: 0.29 THOUSAND/ÂΜL (ref 0–0.61)
EOSINOPHIL NFR BLD AUTO: 4 % (ref 0–6)
ERYTHROCYTE [DISTWIDTH] IN BLOOD BY AUTOMATED COUNT: 13.1 % (ref 11.6–15.1)
EST. AVERAGE GLUCOSE BLD GHB EST-MCNC: 117 MG/DL
GFR SERPL CREATININE-BSD FRML MDRD: 82 ML/MIN/1.73SQ M
GLUCOSE P FAST SERPL-MCNC: 83 MG/DL (ref 65–99)
HBA1C MFR BLD: 5.7 %
HCT VFR BLD AUTO: 44.6 % (ref 36.5–49.3)
HDLC SERPL-MCNC: 76 MG/DL
HGB BLD-MCNC: 14.4 G/DL (ref 12–17)
IMM GRANULOCYTES # BLD AUTO: 0.02 THOUSAND/UL (ref 0–0.2)
IMM GRANULOCYTES NFR BLD AUTO: 0 % (ref 0–2)
LDLC SERPL CALC-MCNC: 86 MG/DL (ref 0–100)
LEFT EYE DIABETIC RETINOPATHY: NORMAL
LYMPHOCYTES # BLD AUTO: 2.58 THOUSANDS/ÂΜL (ref 0.6–4.47)
LYMPHOCYTES NFR BLD AUTO: 37 % (ref 14–44)
MCH RBC QN AUTO: 28.7 PG (ref 26.8–34.3)
MCHC RBC AUTO-ENTMCNC: 32.3 G/DL (ref 31.4–37.4)
MCV RBC AUTO: 89 FL (ref 82–98)
MONOCYTES # BLD AUTO: 0.92 THOUSAND/ÂΜL (ref 0.17–1.22)
MONOCYTES NFR BLD AUTO: 13 % (ref 4–12)
NEUTROPHILS # BLD AUTO: 3.19 THOUSANDS/ÂΜL (ref 1.85–7.62)
NEUTS SEG NFR BLD AUTO: 46 % (ref 43–75)
NRBC BLD AUTO-RTO: 0 /100 WBCS
PLATELET # BLD AUTO: 284 THOUSANDS/UL (ref 149–390)
PMV BLD AUTO: 10.6 FL (ref 8.9–12.7)
POTASSIUM SERPL-SCNC: 4.2 MMOL/L (ref 3.5–5.3)
PROT SERPL-MCNC: 6.8 G/DL (ref 6.4–8.4)
RBC # BLD AUTO: 5.02 MILLION/UL (ref 3.88–5.62)
RIGHT EYE DIABETIC RETINOPATHY: NORMAL
SODIUM SERPL-SCNC: 140 MMOL/L (ref 135–147)
TRIGL SERPL-MCNC: 96 MG/DL
TSH SERPL DL<=0.05 MIU/L-ACNC: 1.3 UIU/ML (ref 0.45–4.5)
WBC # BLD AUTO: 7.03 THOUSAND/UL (ref 4.31–10.16)

## 2024-07-19 PROCEDURE — 80053 COMPREHEN METABOLIC PANEL: CPT

## 2024-07-19 PROCEDURE — 85025 COMPLETE CBC W/AUTO DIFF WBC: CPT

## 2024-07-19 PROCEDURE — 36415 COLL VENOUS BLD VENIPUNCTURE: CPT

## 2024-07-19 PROCEDURE — 80061 LIPID PANEL: CPT

## 2024-07-19 PROCEDURE — 83036 HEMOGLOBIN GLYCOSYLATED A1C: CPT

## 2024-07-19 PROCEDURE — 84443 ASSAY THYROID STIM HORMONE: CPT

## 2024-07-23 ENCOUNTER — OFFICE VISIT (OUTPATIENT)
Dept: FAMILY MEDICINE CLINIC | Facility: MEDICAL CENTER | Age: 69
End: 2024-07-23
Payer: MEDICARE

## 2024-07-23 VITALS
SYSTOLIC BLOOD PRESSURE: 128 MMHG | HEIGHT: 65 IN | TEMPERATURE: 98.1 F | RESPIRATION RATE: 17 BRPM | BODY MASS INDEX: 26.23 KG/M2 | OXYGEN SATURATION: 98 % | DIASTOLIC BLOOD PRESSURE: 78 MMHG | WEIGHT: 157.4 LBS | HEART RATE: 82 BPM

## 2024-07-23 DIAGNOSIS — J44.9 CHRONIC OBSTRUCTIVE PULMONARY DISEASE, UNSPECIFIED COPD TYPE (HCC): ICD-10-CM

## 2024-07-23 DIAGNOSIS — I10 ESSENTIAL HYPERTENSION: ICD-10-CM

## 2024-07-23 DIAGNOSIS — E78.2 MIXED HYPERLIPIDEMIA: ICD-10-CM

## 2024-07-23 DIAGNOSIS — E11.9 TYPE 2 DIABETES MELLITUS WITHOUT COMPLICATION, WITHOUT LONG-TERM CURRENT USE OF INSULIN (HCC): Primary | ICD-10-CM

## 2024-07-23 DIAGNOSIS — F33.41 RECURRENT MAJOR DEPRESSIVE DISORDER, IN PARTIAL REMISSION (HCC): ICD-10-CM

## 2024-07-23 DIAGNOSIS — F41.1 GENERALIZED ANXIETY DISORDER: ICD-10-CM

## 2024-07-23 PROBLEM — N18.31 STAGE 3A CHRONIC KIDNEY DISEASE (HCC): Status: RESOLVED | Noted: 2024-03-21 | Resolved: 2024-07-23

## 2024-07-23 PROCEDURE — G2211 COMPLEX E/M VISIT ADD ON: HCPCS | Performed by: INTERNAL MEDICINE

## 2024-07-23 PROCEDURE — 99214 OFFICE O/P EST MOD 30 MIN: CPT | Performed by: INTERNAL MEDICINE

## 2024-07-23 NOTE — PROGRESS NOTES
INTERNAL MEDICINE OFFICE VISIT  [facility] [place] [place] [address]  Tel: [phone]      NAME: Ken Jacques  AGE: 69 y.o.  SEX: male  : 1955   MRN: [id number]   DATE: 2024  TIME: 11:04 AM      Assessment and Plan:  1. Type 2 diabetes mellitus without complication, without long-term current use of insulin (HCC)  type 2 diabetes is extremely well-controlled with a A1c of 5.7, continue the same medication.  - Albumin / creatinine urine ratio; Future  - Comprehensive metabolic panel; Future  - Hemoglobin A1C; Future  - CBC and differential; Future  - Lipid Panel with Direct LDL reflex; Future  - TSH, 3rd generation with Free T4 reflex; Future    2. Essential hypertension  Blood pressure is controlled, continue the same medication    3. Mixed hyperlipidemia  Continue atorvastatin    4. Chronic obstructive pulmonary disease, unspecified COPD type (HCC)  Continue albuterol inhaler as needed and take the Breo regularly.    5. Generalized anxiety disorder  Continue BuSpar and Prozac, still having symptoms of anxiety    6. Recurrent major depressive disorder, in partial remission ([facility])  Continue Prozac      - Counseling Documentation: patient was counseled regarding: diagnostic results, instructions for management, risk factor reductions, prognosis, patient and family education, risks and benefits of treatment options, and importance of compliance with treatment  - Medication Side Effects: Adverse side effects of medications were reviewed with the patient/guardian today.      Return for follow up visit in 6 months or earlier, if needed.      Chief Complaint:  Chief Complaint   Patient presents with    Follow-up     4 month            History of Present Illness:   Patient is doing very well with diabetic control  Blood pressure and cholesterol are also stable  Has always been very nervous and anxious even though he takes his medication regularly.   Not really depressed.      Active Problem List:  Patient Active Problem List   Diagnosis    Generalized anxiety disorder    Cardiac arrhythmia    Chronic obstructive pulmonary disease (HCC)    Recurrent major depressive disorder, in partial remission ([facility])    Diastolic dysfunction    Ebstein anomaly    Mixed hyperlipidemia    Essential hypertension    Hypertensive heart disease    Nonrheumatic pulmonary valve insufficiency    Overweight    Tricuspid valve disorders, non-rheumatic    Type 2 diabetes mellitus, without long-term current use of insulin (HCC)    Esophageal dysphagia    Urine frequency         Past Medical History:  Past Medical History:   Diagnosis Date    Abnormal EKG     Alcohol abuse     Allergic rhinitis     last assessed: 6/4/2014    Anxiety     Asthma     last assesssed: 6/4/2014    Atresia of esophagus with tracheo-esophageal fistula     Benign neoplasm of colon     Campylobacter enteritis     last assessed: 5/6/2015    Cardiac arrhythmia     last assessed: 6/19/2015    Chronic cough     last assessed: 1/29/2015    Chronic fatigue syndrome     resolved: 6/4/2014    Chronic sinusitis     resolved: 6/4/2014    COPD (chronic obstructive pulmonary disease) ([facility])     Diabetes mellitus ([facility])     Digestive symptom     EKG, abnormal     Epistaxis     Generalized osteoarthritis     resolved: 6/4/2014    GERD without esophagitis 5/14/2014    Heart disease     HTN (hypertension)     Hyperlipidemia     Myalgia     Myositis     Poisoning by drug or medicinal substance     Sleep apnea          Past Surgical History:  Past Surgical History:   Procedure Laterality Date    COLONOSCOPY      ESOPHAGOGASTRODUODENOSCOPY N/A 10/2/2018    Procedure: ESOPHAGOGASTRODUODENOSCOPY (EGD);  Surgeon: Joycelyn Lainez MD;  Location: [facility];  Service: Gastroenterology    FOOT SURGERY      SD ESOPHAGOGASTRODUODENOSCOPY TRANSORAL DIAGNOSTIC N/A 11/8/2018    Procedure: ESOPHAGOGASTRODUODENOSCOPY (EGD);  Surgeon: Moise CALDERON  Yair ELDRIDGE MD;  Location: [facility];  Service: Gastroenterology    TONSILLECTOMY           Family History:  Family History   Problem Relation Age of Onset    Asthma Mother          Social History:  Social History     Socioeconomic History    Marital status: Single     Spouse name: None    Number of children: None    Years of education: None    Highest education level: None   Occupational History    None   Tobacco Use    Smoking status: Former     Current packs/day: 0.00     Types: Cigarettes     Quit date: 10/2/2006     Years since quittin.8    Smokeless tobacco: Never    Tobacco comments:     cigars-social   Vaping Use    Vaping status: Never Used   Substance and Sexual Activity    Alcohol use: No     Comment: rarely    Drug use: No    Sexual activity: Yes     Partners: Female   Other Topics Concern    None   Social History Narrative    None     Social Determinants of Health     Financial Resource Strain: Low Risk  (2023)    Overall Financial Resource Strain (CARDIA)     Difficulty of Paying Living Expenses: Not hard at all   Food Insecurity: Not on file   Transportation Needs: No Transportation Needs (2023)    PRAPARE - Transportation     Lack of Transportation (Medical): No     Lack of Transportation (Non-Medical): No   Physical Activity: Insufficiently Active (2021)    Exercise Vital Sign     Days of Exercise per Week: 3 days     Minutes of Exercise per Session: 40 min   Stress: No Stress Concern Present (2021)    [employer] - Occupational Stress Questionnaire     Feeling of Stress : Not at all   Social Connections: Not on file   Intimate Partner Violence: Not on file   Housing Stability: Not on file         Allergies:  No Known Allergies      Medications:    Current Outpatient Medications:     albuterol (PROVENTIL HFA,VENTOLIN HFA) 90 mcg/act inhaler, INHALE 2 PUFFS BY MOUTH EVERY 4 HOURS AS NEEDED FOR WHEEZE, Disp: 18 g, Rfl: 5    aspirin (ECOTRIN LOW  STRENGTH) 81 mg EC tablet, Take 1 tablet by mouth daily, Disp: , Rfl:     atorvastatin (LIPITOR) 10 mg tablet, TAKE 1 TABLET BY MOUTH EVERY DAY, Disp: 90 tablet, Rfl: 2    benzonatate (TESSALON PERLES) 100 mg capsule, Take 1 capsule (100 mg total) by mouth 3 (three) times a day as needed for cough, Disp: 20 capsule, Rfl: 0    busPIRone (BUSPAR) 15 mg tablet, TAKE 1 TABLET BY MOUTH TWICE DAILY FOR 90 DAYS, Disp: , Rfl:     diltiazem (CARDIZEM CD) 180 mg 24 hr capsule, TAKE 1 CAPSULE BY MOUTH EVERY DAY, Disp: 90 capsule, Rfl: 1    FLUoxetine (PROzac) 40 MG capsule, Take 1 capsule by mouth daily, Disp: , Rfl:     fluticasone-vilanterol (Breo Ellipta) 200-25 mcg/actuation inhaler, Inhale 1 puff daily Rinse mouth after use., Disp: 60 blister, Rfl: 2    glucose blood (OneTouch Ultra) test strip, Test once daily E11.9, Disp: 100 each, Rfl: 3    ipratropium (ATROVENT) 0.03 % nasal spray, SPRAY 2 SPRAYS INTO EACH NOSTRIL EVERY 12 HOURS., Disp: 30 mL, Rfl: 0    metFORMIN (GLUCOPHAGE) 500 mg tablet, TAKE 1 TABLET BY MOUTH TWICE A DAY, Disp: 180 tablet, Rfl: 2    olmesartan (BENICAR) 40 mg tablet, Take 1 tablet (40 mg total) by mouth daily, Disp: 90 tablet, Rfl: 3    ONETOUCH DELICA LANCETS FINE MISC, by Does not apply route, Disp: , Rfl:     temazepam (RESTORIL) 30 mg capsule, Take 1 capsule by mouth, Disp: , Rfl:     zolpidem (AMBIEN) 10 mg tablet, , Disp: , Rfl:     Blood Glucose Monitoring Suppl (ONE TOUCH ULTRA MINI) w/Device KIT, by Does not apply route daily for 30 days PT. TESTING ONCE DAILY DX:E11.9, Disp: 1 each, Rfl: 0      The following portions of the patient's history were reviewed and updated as appropriate: past medical history, past surgical history, family history, social history, allergies, current medications and active problem list.      Review of Systems:  Constitutional: Denies fever, chills, weight gain, weight loss, fatigue  Eyes: Denies eye redness, eye discharge, double vision, change in visual  acuity  ENT: Denies hearing loss, tinnitus, sneezing, nasal congestion, nasal discharge, sore throat   Respiratory: Denies cough, expectoration, hemoptysis, shortness of breath, wheezing  Cardiovascular: Denies chest pain, palpitations, lower extremity swelling, orthopnea, PND  Gastrointestinal: Denies abdominal pain, heartburn, nausea, vomiting, hematemesis, diarrhea, bloody stools  Genito-Urinary: Denies dysuria, frequency, difficulty in micturition, nocturia, incontinence  Musculoskeletal: Denies back pain, joint pain, muscle pain  Neurologic: Denies confusion, lightheadedness, syncope, headache, focal weakness, sensory changes, seizures  Endocrine: Denies polyuria, polydipsia, temperature intolerance  Allergy and Immunology: Denies hives, insect bite sensitivity  Hematological and Lymphatic: Denies bleeding problems, swollen glands   Psychological: Denies depression, suicidal ideation, anxiety, panic, mood swings  Dermatological: Denies pruritus, rash, skin lesion changes      Vitals:  Vitals:    07/23/24 1048   BP: 128/78   Pulse: 82   Resp: 17   Temp: 98.1 °F (36.7 °C)   SpO2: 98%       Body mass index is 26.19 kg/m².    Weight (last 2 days)       Date/Time Weight    07/23/24 1048 71.4 (157.4)              Physical Examination:  General: Patient is not in acute distress. Awake, alert, responding to commands. No weight gain or loss  Head: Normocephalic. Atraumatic  Eyes: Conjunctiva and lids with no swelling, erythema or discharge. Both pupils normal sized, round and reactive to light. Sclera nonicteric  ENT: External examination of nose and ear normal. Otoscopic examination shows translucent tympanic membranes with patent canals without erythema. Oropharynx moist with no erythema, edema, exudate or lesions  Neck: Supple. JVP not raised. Trachea midline. No masses. No thyromegaly  Lungs: No signs of increased work of breathing or respiratory distress. Bilateral bronchovascular breath sounds with no crackles or  "rhonchi  Chest wall: No tenderness  Cardiovascular: Normal PMI. No thrills. Regular rate and rhythm. S1 and S2 normal. No murmur, rub or gallop  Gastrointestinal: Abdomen soft, nontender. No guarding or rigidity. Liver and spleen not palpable. Bowel sounds present  Neurologic: Cranial nerves II-XII intact. Cortical functions normal. Motor system - Reflexes 2+ and symmetrical. Sensations normal  Musculoskeletal: Gait normal. No joint tenderness  Integumentary: Skin normal with no rash or lesions  Lymphatic: No palpable lymph nodes in neck, axilla or groin  Extremities: No clubbing, cyanosis, edema or varicosities  Psychological: Judgement and insight normal. Mood and affect normal      Laboratory Results:  CBC with diff:   Lab Results   Component Value Date    WBC 7.03 07/19/2024    WBC 8.33 10/19/2015    RBC 5.02 07/19/2024    RBC 5.46 10/19/2015    HGB 14.4 07/19/2024    HGB 14.8 10/19/2015    HCT 44.6 07/19/2024    HCT 44.9 10/19/2015    MCV 89 07/19/2024    MCV 82 10/19/2015    MCH 28.7 07/19/2024    MCH 27.1 10/19/2015    RDW 13.1 07/19/2024    RDW 13.0 10/19/2015     07/19/2024     10/19/2015       CMP:  Lab Results   Component Value Date    CREATININE 0.93 07/19/2024    CREATININE 0.85 10/19/2015    BUN 15 07/19/2024    BUN 10 10/19/2015     10/19/2015    K 4.2 07/19/2024    K 3.6 10/19/2015     07/19/2024     10/19/2015    CO2 28 07/19/2024    CO2 27 10/19/2015    GLUCOSE 142 (H) 10/19/2015    PROT 7.3 10/19/2015    ALKPHOS 76 07/19/2024    ALKPHOS 87 10/19/2015    ALT 10 07/19/2024    ALT 17 10/19/2015    AST 29 07/19/2024    AST 17 10/19/2015       Lab Results   Component Value Date    HGBA1C 5.7 (H) 07/19/2024    HGBA1C 6.7 (H) 10/19/2015       No results found for: \"TROPONINI\", \"CKMB\", \"CKTOTAL\"    Lipid Profile:   Lab Results   Component Value Date    CHOL 139 10/19/2015    CHOL 156 05/30/2015     Lab Results   Component Value Date    HDL 76 07/19/2024    HDL 70 03/15/2024 "     Lab Results   Component Value Date    LDLCALC 86 07/19/2024    LDLCALC 54 03/15/2024     Lab Results   Component Value Date    TRIG 96 07/19/2024    TRIG 209 (H) 03/15/2024       Imaging Results:       Health Maintenance:  Health Maintenance   Topic Date Due    RSV Vaccine Age 60+ Years (1 - 1-dose 60+ series) Never done    Influenza Vaccine (1) 09/01/2024    HEMOGLOBIN A1C  01/19/2025    DM Eye Exam  02/23/2025    Kidney Health Evaluation: Albumin/Creatinine Ratio  03/15/2025    Depression Screening  03/21/2025    Medicare Annual Wellness Visit (AWV)  03/21/2025    Depression Follow-up Plan  03/21/2025    Diabetic Foot Exam  03/21/2025    Colorectal Cancer Screening  06/10/2025    Kidney Health Evaluation: GFR  07/19/2025    Fall Risk  07/23/2025    Hepatitis C Screening  Completed    Zoster Vaccine  Completed    Pneumococcal Vaccine: 65+ Years  Completed    COVID-19 Vaccine  Completed    RSV Vaccine age 0-20 Months  Aged Out    HIB Vaccine  Aged Out    IPV Vaccine  Aged Out    Hepatitis A Vaccine  Aged Out    Meningococcal ACWY Vaccine  Aged Out    HPV Vaccine  Aged Out     Immunization History   Administered Date(s) Administered    COVID-19 MODERNA VACC 0.5 ML IM 02/13/2021, 03/13/2021    COVID-19 Pfizer mRNA vacc PF jarred-sucrose 12 yr and older (Comirnaty) 01/04/2024    INFLUENZA 10/28/2016, 10/24/2018, 08/28/2021, 09/30/2022    Influenza Quadrivalent, 6-35 Months IM 10/06/2014, 10/28/2016, 09/08/2017    Influenza, high dose seasonal 0.7 mL 09/16/2019, 10/17/2020, 10/19/2023    Influenza, recombinant, quadrivalent,injectable, preservative free 10/24/2018    Influenza, seasonal, injectable 10/21/2013, 10/06/2015    Pneumococcal Conjugate 13-Valent 07/30/2021    Pneumococcal Conjugate Vaccine 20-valent (Pcv20), Polysace 11/01/2022    Pneumococcal Polysaccharide PPV23 06/04/2014, 02/27/2017    Tdap 1955    Zoster 01/01/2014    Zoster Vaccine Recombinant 01/01/2014, 07/30/2021, 10/06/2022         Soraya  MD Cata  7/23/2024,11:04 AM

## 2024-09-01 DIAGNOSIS — J44.9 CHRONIC OBSTRUCTIVE PULMONARY DISEASE, UNSPECIFIED COPD TYPE (HCC): ICD-10-CM

## 2024-09-01 RX ORDER — FLUTICASONE FUROATE AND VILANTEROL TRIFENATATE 200; 25 UG/1; UG/1
POWDER RESPIRATORY (INHALATION)
Qty: 60 EACH | Refills: 2 | Status: SHIPPED | OUTPATIENT
Start: 2024-09-01

## 2024-09-23 ENCOUNTER — HOSPITAL ENCOUNTER (EMERGENCY)
Facility: HOSPITAL | Age: 69
Discharge: HOME | End: 2024-09-23
Payer: MEDICARE

## 2024-09-23 VITALS
RESPIRATION RATE: 20 BRPM | SYSTOLIC BLOOD PRESSURE: 136 MMHG | OXYGEN SATURATION: 97 % | DIASTOLIC BLOOD PRESSURE: 83 MMHG | BODY MASS INDEX: 26.16 KG/M2 | WEIGHT: 157.19 LBS | TEMPERATURE: 97.3 F | HEART RATE: 76 BPM

## 2024-09-23 DIAGNOSIS — R04.0 LEFT-SIDED EPISTAXIS: Primary | ICD-10-CM

## 2024-09-23 PROCEDURE — 99283 EMERGENCY DEPT VISIT LOW MDM: CPT

## 2024-09-23 PROCEDURE — 99282 EMERGENCY DEPT VISIT SF MDM: CPT

## 2024-09-23 RX ORDER — OXYMETAZOLINE HYDROCHLORIDE 0.05 G/100ML
2 SPRAY NASAL ONCE
Status: COMPLETED | OUTPATIENT
Start: 2024-09-23 | End: 2024-09-23

## 2024-09-23 RX ADMIN — OXYMETAZOLINE HYDROCHLORIDE 2 SPRAY: 0.05 SPRAY NASAL at 17:50

## 2024-09-23 NOTE — ED PROVIDER NOTES
1. Left-sided epistaxis      ED Disposition       ED Disposition   Discharge    Condition   Stable    Date/Time   Mon Sep 23, 2024  6:22 PM    Comment   Ken Jacques discharge to home/self care.                   Assessment & Plan       Medical Decision Making  69-year-old male presenting to the emergency department for evaluation of nosebleed    Differential includes septal injury, anterior nosebleed, posterior nosebleed    Physical exam as noted above.  He does have a small mucosal defect likely from a scratch from his nail.  Just posterior to that there is an area of bleeding.  1 patient initially arrived he did not have epistaxis.  I asked patient to blow his nose which started the bleeding again from the anterior portion of his left naris.  This was visualized using otoscope.  Trialed Afrin with minimal improvement.  After that nasal packing was placed and patient was observed in the emergency department.  Repeat oropharyngeal examination did not show any posterior oropharyngeal bleeding leading me to believe this was an anterior nasal bleed.  Patient instructed to follow-up with ENT next 3 days for packing removal.  He was given return precautions.  At time of discharge his review of systems remains unchanged, denies any shortness of breath etc.  He was noted to be ambulatory out of the emergency department without difficulty.  Patient discharged.    Risk  OTC drugs.                ED Course as of 09/23/24 2345   Mon Sep 23, 2024   1755 Trialed Afrin, pt notes minimal improvement   1819 Patient reports improvement in bleeding after pressure. Will pack, refer to ENT   1831 Patient reports he no longer feels blood running down back of throat.  Blood clot appreciated in the left anterior nasal septum at site of prior bleeding.  Patient packed.   1900 Patient reevaluated, no posterior oropharyngeal bleeding appreciated.   1912 Patient ambulated out of ED without difficulty       Medications   oxymetazoline  (AFRIN) 0.05 % nasal spray 2 spray (2 sprays Each Nare Given 9/23/24 1750)       History of Present Illness       Patient is a 69-year-old male with a past medical history significant for deviated septum, chronic rhinitis presenting to the emergency department for evaluation of nosebleed.  Patient states that beginning a couple days ago he developed intermittent nosebleeds.  He reports a history of this in the past requiring cauterization.  He notes that he had intermittent nosebleeds yesterday and then again this morning.  He reports worsening when he blows his nose.  He does state it started in close proximity to picking his nose a couple days ago.  He denies lightheadedness, shortness of breath, chest pain, dizziness.  He does take 81 mg aspirin daily. He reports he is otherwise well. History obtained through  services.        Review of Systems   Constitutional:  Negative for chills and fever.   HENT:  Positive for nosebleeds. Negative for ear pain and sore throat.    Eyes:  Negative for pain and visual disturbance.   Respiratory:  Negative for cough and shortness of breath.    Cardiovascular:  Negative for chest pain and palpitations.   Gastrointestinal:  Negative for abdominal pain and vomiting.   Genitourinary:  Negative for dysuria and hematuria.   Musculoskeletal:  Negative for arthralgias and back pain.   Skin:  Negative for color change and rash.   Neurological:  Negative for seizures and syncope.   All other systems reviewed and are negative.          Objective     ED Triage Vitals [09/23/24 1454]   Temperature Pulse Blood Pressure Respirations SpO2 Patient Position - Orthostatic VS   (!) 97.3 °F (36.3 °C) 76 136/83 20 97 % Sitting      Temp Source Heart Rate Source BP Location FiO2 (%) Pain Score    Temporal Monitor Left arm -- --        Physical Exam  Vitals and nursing note reviewed.   Constitutional:       General: He is not in acute distress.     Appearance: Normal appearance. He is not  ill-appearing, toxic-appearing or diaphoretic.      Comments: Well-appearing male in no acute distress sitting on stretcher.   HENT:      Head: Normocephalic and atraumatic.      Nose:      Comments: Area on nasal septum of left naris that appears to be small soft tissue injury presumably from nail.  Area of bleeding noted just posterior to that in anterior nasal septum.  Eyes:      General: No scleral icterus.        Right eye: No discharge.         Left eye: No discharge.      Extraocular Movements: Extraocular movements intact.      Conjunctiva/sclera: Conjunctivae normal.      Comments: No conjunctival pallor.   Cardiovascular:      Rate and Rhythm: Normal rate.      Pulses: Normal pulses.      Heart sounds: Normal heart sounds. No murmur heard.     No friction rub. No gallop.   Pulmonary:      Effort: Pulmonary effort is normal. No respiratory distress.      Breath sounds: Normal breath sounds. No stridor. No wheezing, rhonchi or rales.   Abdominal:      General: Abdomen is flat. Bowel sounds are normal. There is no distension.      Palpations: Abdomen is soft.      Tenderness: There is no abdominal tenderness. There is no guarding or rebound.   Musculoskeletal:         General: No swelling. Normal range of motion.      Cervical back: Normal range of motion. No rigidity.      Right lower leg: No edema.      Left lower leg: No edema.   Skin:     General: Skin is warm and dry.      Capillary Refill: Capillary refill takes less than 2 seconds.      Coloration: Skin is not jaundiced.      Findings: No bruising or lesion.   Neurological:      General: No focal deficit present.      Mental Status: He is alert and oriented to person, place, and time. Mental status is at baseline.   Psychiatric:         Mood and Affect: Mood normal.         Behavior: Behavior normal.         Thought Content: Thought content normal.         Judgment: Judgment normal.         Labs Reviewed - No data to display  No orders to display        Procedures    ED Medication and Procedure Management   Prior to Admission Medications   Prescriptions Last Dose Informant Patient Reported? Taking?   Blood Glucose Monitoring Suppl (ONE TOUCH ULTRA MINI) w/Device KIT  Self No No   Sig: by Does not apply route daily for 30 days PT. TESTING ONCE DAILY DX:E11.9   Breo Ellipta 200-25 MCG/ACT inhaler   No No   Sig: INHALE 1 PUFF DAILY RINSE MOUTH AFTER USE.   FLUoxetine (PROzac) 40 MG capsule  Self Yes No   Sig: Take 1 capsule by mouth daily   ONETOUCH DELICA LANCETS FINE MISC  Self Yes No   Sig: by Does not apply route   albuterol (PROVENTIL HFA,VENTOLIN HFA) 90 mcg/act inhaler   No No   Sig: INHALE 2 PUFFS BY MOUTH EVERY 4 HOURS AS NEEDED FOR WHEEZE   aspirin (ECOTRIN LOW STRENGTH) 81 mg EC tablet  Self Yes No   Sig: Take 1 tablet by mouth daily   atorvastatin (LIPITOR) 10 mg tablet   No No   Sig: TAKE 1 TABLET BY MOUTH EVERY DAY   benzonatate (TESSALON PERLES) 100 mg capsule   No No   Sig: Take 1 capsule (100 mg total) by mouth 3 (three) times a day as needed for cough   busPIRone (BUSPAR) 15 mg tablet   Yes No   Sig: TAKE 1 TABLET BY MOUTH TWICE DAILY FOR 90 DAYS   diltiazem (CARDIZEM CD) 180 mg 24 hr capsule   No No   Sig: TAKE 1 CAPSULE BY MOUTH EVERY DAY   glucose blood (OneTouch Ultra) test strip   No No   Sig: Test once daily E11.9   ipratropium (ATROVENT) 0.03 % nasal spray   No No   Sig: SPRAY 2 SPRAYS INTO EACH NOSTRIL EVERY 12 HOURS.   metFORMIN (GLUCOPHAGE) 500 mg tablet   No No   Sig: TAKE 1 TABLET BY MOUTH TWICE A DAY   olmesartan (BENICAR) 40 mg tablet   No No   Sig: Take 1 tablet (40 mg total) by mouth daily   temazepam (RESTORIL) 30 mg capsule  Self Yes No   Sig: Take 1 capsule by mouth   zolpidem (AMBIEN) 10 mg tablet  Self Yes No      Facility-Administered Medications: None     Discharge Medication List as of 9/23/2024  7:05 PM        CONTINUE these medications which have NOT CHANGED    Details   albuterol (PROVENTIL HFA,VENTOLIN HFA) 90  mcg/act inhaler INHALE 2 PUFFS BY MOUTH EVERY 4 HOURS AS NEEDED FOR WHEEZE, Normal      aspirin (ECOTRIN LOW STRENGTH) 81 mg EC tablet Take 1 tablet by mouth daily, Starting Wed 5/14/2014, Historical Med      atorvastatin (LIPITOR) 10 mg tablet TAKE 1 TABLET BY MOUTH EVERY DAY, Normal      benzonatate (TESSALON PERLES) 100 mg capsule Take 1 capsule (100 mg total) by mouth 3 (three) times a day as needed for cough, Starting Tue 2/20/2024, Normal      Blood Glucose Monitoring Suppl (ONE TOUCH ULTRA MINI) w/Device KIT by Does not apply route daily for 30 days PT. TESTING ONCE DAILY DX:E11.9, Starting Tue 3/26/2019, Until Wed 12/1/2021, Normal      Breo Ellipta 200-25 MCG/ACT inhaler INHALE 1 PUFF DAILY RINSE MOUTH AFTER USE., Normal      busPIRone (BUSPAR) 15 mg tablet TAKE 1 TABLET BY MOUTH TWICE DAILY FOR 90 DAYS, Historical Med      diltiazem (CARDIZEM CD) 180 mg 24 hr capsule TAKE 1 CAPSULE BY MOUTH EVERY DAY, Normal      FLUoxetine (PROzac) 40 MG capsule Take 1 capsule by mouth daily, Starting Wed 5/14/2014, Historical Med      glucose blood (OneTouch Ultra) test strip Test once daily E11.9, Normal      ipratropium (ATROVENT) 0.03 % nasal spray SPRAY 2 SPRAYS INTO EACH NOSTRIL EVERY 12 HOURS., Starting Thu 5/16/2024, Normal      metFORMIN (GLUCOPHAGE) 500 mg tablet TAKE 1 TABLET BY MOUTH TWICE A DAY, Normal      olmesartan (BENICAR) 40 mg tablet Take 1 tablet (40 mg total) by mouth daily, Starting Thu 3/21/2024, Normal      ONETOUCH DELICA LANCETS FINE MISC by Does not apply route, Starting Tue 1/21/2014, Historical Med      temazepam (RESTORIL) 30 mg capsule Take 1 capsule by mouth, Starting Wed 5/14/2014, Historical Med      zolpidem (AMBIEN) 10 mg tablet Starting Wed 1/17/2018, Historical Med                Anuradha Kelley, DO  09/23/24 [id number]

## 2024-09-23 NOTE — DISCHARGE INSTRUCTIONS
Return to the ER for new or worsening symptoms including worsening shortness of breath, lightheadedness, dizziness, bleeding that is not stopping..  Follow-up with ENT in 2 to 3 days to have your packing removed.

## 2024-09-24 ENCOUNTER — VBI (OUTPATIENT)
Dept: FAMILY MEDICINE CLINIC | Facility: MEDICAL CENTER | Age: 69
End: 2024-09-24

## 2024-09-24 NOTE — TELEPHONE ENCOUNTER
09/24/24 12:23 PM    Patient contacted post ED visit, [facility] department spoke with patient/caregiver and outreach was successful.    Thank you.  Rivka Olivier MA  [facility]

## 2024-10-24 DIAGNOSIS — E11.9 TYPE 2 DIABETES MELLITUS WITHOUT COMPLICATION, WITHOUT LONG-TERM CURRENT USE OF INSULIN (HCC): ICD-10-CM

## 2024-10-24 DIAGNOSIS — E78.2 MIXED HYPERLIPIDEMIA: ICD-10-CM

## 2024-10-25 RX ORDER — ATORVASTATIN CALCIUM 10 MG/1
TABLET, FILM COATED ORAL
Qty: 90 TABLET | Refills: 1 | Status: SHIPPED | OUTPATIENT
Start: 2024-10-25

## 2024-12-05 DIAGNOSIS — J44.9 CHRONIC OBSTRUCTIVE PULMONARY DISEASE, UNSPECIFIED COPD TYPE (HCC): ICD-10-CM

## 2024-12-05 RX ORDER — FLUTICASONE FUROATE AND VILANTEROL 200; 25 UG/1; UG/1
1 POWDER RESPIRATORY (INHALATION) DAILY
Qty: 90 EACH | Refills: 1 | Status: SHIPPED | OUTPATIENT
Start: 2024-12-05

## 2024-12-05 NOTE — TELEPHONE ENCOUNTER
Reason for call:   [x] Refill   [] Prior Auth  [] Other:     Office:   [x] PCP/Provider -  Soraya Ivy MD / Varun Sanford  [] Specialty/Provider -     Medication: Breo Ellipta 200-25 MCG/ACT inhaler     Dose/Frequency: : INHALE 1 PUFF DAILY RINSE MOUTH AFTER USE     Quantity: 60    Pharmacy: [facility]/pharmacy #[id number] - [place] - [address]     Does the patient have enough for 3 days?   [] Yes   [x] No - Send as HP to POD

## 2024-12-20 DIAGNOSIS — I10 ESSENTIAL HYPERTENSION: ICD-10-CM

## 2024-12-23 RX ORDER — DILTIAZEM HYDROCHLORIDE 180 MG/1
180 CAPSULE, COATED, EXTENDED RELEASE ORAL DAILY
Qty: 90 CAPSULE | Refills: 1 | Status: SHIPPED | OUTPATIENT
Start: 2024-12-23

## 2024-12-30 ENCOUNTER — TELEPHONE (OUTPATIENT)
Age: 69
End: 2024-12-30

## 2024-12-30 NOTE — TELEPHONE ENCOUNTER
Pt called stating he has a bad cough and requesting an appointment at the [facility]. Said he wants to continue coming to this office rather than changing to [facility] with Dr. Ivy. Pt accepted an appointment for Tuesday, 12/31/24 to see April.

## 2024-12-31 ENCOUNTER — APPOINTMENT (OUTPATIENT)
Age: 69
End: 2024-12-31
Payer: MEDICARE

## 2024-12-31 ENCOUNTER — OFFICE VISIT (OUTPATIENT)
Age: 69
End: 2024-12-31
Payer: MEDICARE

## 2024-12-31 VITALS
WEIGHT: 159 LBS | OXYGEN SATURATION: 94 % | DIASTOLIC BLOOD PRESSURE: 70 MMHG | SYSTOLIC BLOOD PRESSURE: 100 MMHG | HEIGHT: 65 IN | RESPIRATION RATE: 20 BRPM | BODY MASS INDEX: 26.49 KG/M2 | TEMPERATURE: 97.5 F | HEART RATE: 84 BPM

## 2024-12-31 DIAGNOSIS — J44.0 ACUTE BRONCHITIS WITH CHRONIC OBSTRUCTIVE PULMONARY DISEASE (COPD)  (HCC): ICD-10-CM

## 2024-12-31 DIAGNOSIS — J20.9 ACUTE BRONCHITIS WITH CHRONIC OBSTRUCTIVE PULMONARY DISEASE (COPD)  (HCC): Primary | ICD-10-CM

## 2024-12-31 DIAGNOSIS — J44.9 CHRONIC OBSTRUCTIVE PULMONARY DISEASE, UNSPECIFIED COPD TYPE (HCC): ICD-10-CM

## 2024-12-31 DIAGNOSIS — J44.0 ACUTE BRONCHITIS WITH CHRONIC OBSTRUCTIVE PULMONARY DISEASE (COPD)  (HCC): Primary | ICD-10-CM

## 2024-12-31 DIAGNOSIS — J20.9 ACUTE BRONCHITIS WITH CHRONIC OBSTRUCTIVE PULMONARY DISEASE (COPD)  (HCC): ICD-10-CM

## 2024-12-31 PROCEDURE — 71046 X-RAY EXAM CHEST 2 VIEWS: CPT

## 2024-12-31 PROCEDURE — 99214 OFFICE O/P EST MOD 30 MIN: CPT

## 2024-12-31 RX ORDER — PREDNISONE 20 MG/1
40 TABLET ORAL DAILY
Qty: 10 TABLET | Refills: 0 | Status: SHIPPED | OUTPATIENT
Start: 2024-12-31 | End: 2025-01-05

## 2024-12-31 RX ORDER — DOXYCYCLINE HYCLATE 100 MG
100 TABLET ORAL 2 TIMES DAILY
Qty: 14 TABLET | Refills: 0 | Status: SHIPPED | OUTPATIENT
Start: 2024-12-31 | End: 2025-01-07

## 2024-12-31 NOTE — ASSESSMENT & PLAN NOTE
High risk for progression to pneumonia.  Using inhaler more frequently.  Continue with daily Breo inhaler.

## 2024-12-31 NOTE — PROGRESS NOTES
Name: Ken Jacques      : 1955      MRN: [id number]  Encounter Provider: Veronica Mejia PA-C  Encounter Date: 2024   Encounter department: [facility] PRIMARY [facility] [place]  :  Assessment & Plan  Acute bronchitis with chronic obstructive pulmonary disease (COPD)  (HCC)  Comorbid COPD and age making higher risk for progression to pneumonia.  VSS, afebrile.  Take doxycycline twice daily for 7 days, prednisone burst, and obtain chest xray due to rhonchi and wheezing on exam.  Reviewed supportive care measures and SE of prednisone including insomnia, increased appetite, and mood changes.  Use rescue inhaler every 4 hours as needed for SOB and wheezing.  Reviewed ED precautions.  Return in two weeks if not improving.  Orders:  •  doxycycline hyclate (VIBRA-TABS) 100 mg tablet; Take 1 tablet (100 mg total) by mouth 2 (two) times a day for 7 days  •  XR chest pa and lateral; Future  •  predniSONE 20 mg tablet; Take 2 tablets (40 mg total) by mouth daily for 5 days    Chronic obstructive pulmonary disease, unspecified COPD type (HCC)  High risk for progression to pneumonia.  Using inhaler more frequently.  Continue with daily Breo inhaler.                History of Present Illness     Cough  Associated symptoms include ear pain (fullness), headaches, a sore throat and shortness of breath. Pertinent negatives include no chest pain, chills, fever, postnasal drip or wheezing.       Pt is here for cough, phelgm, congestion, sinus pressure, ear fullness, sore throat, and sweats.  No fevers, chills.  Pt is more SOB, but no chest pain.  Using his inhaler every 3 hours.      Review of Systems   Constitutional:  Positive for appetite change, diaphoresis and fatigue. Negative for chills and fever.   HENT:  Positive for congestion, ear pain (fullness), sinus pressure, sore throat and voice change (hoarseness). Negative for postnasal drip, sinus pain and trouble swallowing.    Eyes: Negative.    Respiratory:   "Positive for cough, chest tightness and shortness of breath. Negative for wheezing.    Cardiovascular:  Negative for chest pain, palpitations and leg swelling.   Gastrointestinal:  Negative for abdominal distention, abdominal pain, constipation, diarrhea, nausea and vomiting.   Genitourinary: Negative.    Musculoskeletal: Negative.    Skin: Negative.    Neurological:  Positive for headaches. Negative for weakness, light-headedness and numbness.       Objective   /70 (BP Location: Left arm, Patient Position: Sitting, Cuff Size: Standard)   Pulse 84   Temp 97.5 °F (36.4 °C) (Tympanic)   Resp 20   Ht 5' 5\" (1.651 m)   Wt 72.1 kg (159 lb)   SpO2 94%   BMI 26.46 kg/m²      Physical Exam  Vitals and nursing note reviewed.   Constitutional:       General: He is not in acute distress.     Appearance: Normal appearance.   Cardiovascular:      Rate and Rhythm: Normal rate and regular rhythm.      Heart sounds: Normal heart sounds.   Pulmonary:      Effort: Respiratory distress (mild) present.      Breath sounds: No stridor. Examination of the right-upper field reveals rhonchi. Examination of the left-upper field reveals rhonchi. Examination of the right-middle field reveals rhonchi. Examination of the left-middle field reveals rhonchi. Examination of the right-lower field reveals wheezing and rhonchi. Examination of the left-lower field reveals wheezing and rhonchi. Wheezing and rhonchi present. No rales.   Abdominal:      General: Bowel sounds are normal.      Palpations: Abdomen is soft.      Tenderness: There is no abdominal tenderness.   Musculoskeletal:         General: No tenderness, deformity or signs of injury.   Skin:     General: Skin is warm and dry.      Capillary Refill: Capillary refill takes less than 2 seconds.      Coloration: Skin is not pale.   Neurological:      Mental Status: He is alert and oriented to person, place, and time.      Gait: Gait is intact.         "

## 2025-01-02 ENCOUNTER — RESULTS FOLLOW-UP (OUTPATIENT)
Age: 70
End: 2025-01-02

## 2025-01-17 ENCOUNTER — APPOINTMENT (OUTPATIENT)
Age: 70
End: 2025-01-17
Payer: MEDICARE

## 2025-01-17 DIAGNOSIS — E11.9 TYPE 2 DIABETES MELLITUS WITHOUT COMPLICATION, WITHOUT LONG-TERM CURRENT USE OF INSULIN (HCC): ICD-10-CM

## 2025-01-17 LAB
ALBUMIN SERPL BCG-MCNC: 4 G/DL (ref 3.5–5)
ALP SERPL-CCNC: 83 U/L (ref 34–104)
ALT SERPL W P-5'-P-CCNC: 12 U/L (ref 7–52)
ANION GAP SERPL CALCULATED.3IONS-SCNC: 8 MMOL/L (ref 4–13)
AST SERPL W P-5'-P-CCNC: 19 U/L (ref 13–39)
BASOPHILS # BLD AUTO: 0.06 THOUSANDS/ΜL (ref 0–0.1)
BASOPHILS NFR BLD AUTO: 1 % (ref 0–1)
BILIRUB SERPL-MCNC: 0.55 MG/DL (ref 0.2–1)
BUN SERPL-MCNC: 14 MG/DL (ref 5–25)
CALCIUM SERPL-MCNC: 8.8 MG/DL (ref 8.4–10.2)
CHLORIDE SERPL-SCNC: 101 MMOL/L (ref 96–108)
CHOLEST SERPL-MCNC: 192 MG/DL (ref ?–200)
CO2 SERPL-SCNC: 25 MMOL/L (ref 21–32)
CREAT SERPL-MCNC: 0.89 MG/DL (ref 0.6–1.3)
CREAT UR-MCNC: 150.4 MG/DL
EOSINOPHIL # BLD AUTO: 0.14 THOUSAND/ΜL (ref 0–0.61)
EOSINOPHIL NFR BLD AUTO: 2 % (ref 0–6)
ERYTHROCYTE [DISTWIDTH] IN BLOOD BY AUTOMATED COUNT: 12.3 % (ref 11.6–15.1)
EST. AVERAGE GLUCOSE BLD GHB EST-MCNC: 160 MG/DL
GFR SERPL CREATININE-BSD FRML MDRD: 86 ML/MIN/1.73SQ M
GLUCOSE P FAST SERPL-MCNC: 128 MG/DL (ref 65–99)
HBA1C MFR BLD: 7.2 %
HCT VFR BLD AUTO: 46.6 % (ref 36.5–49.3)
HDLC SERPL-MCNC: 56 MG/DL
HGB BLD-MCNC: 14.9 G/DL (ref 12–17)
IMM GRANULOCYTES # BLD AUTO: 0.03 THOUSAND/UL (ref 0–0.2)
IMM GRANULOCYTES NFR BLD AUTO: 0 % (ref 0–2)
LDLC SERPL CALC-MCNC: 106 MG/DL (ref 0–100)
LYMPHOCYTES # BLD AUTO: 2.62 THOUSANDS/ΜL (ref 0.6–4.47)
LYMPHOCYTES NFR BLD AUTO: 35 % (ref 14–44)
MCH RBC QN AUTO: 26.9 PG (ref 26.8–34.3)
MCHC RBC AUTO-ENTMCNC: 32 G/DL (ref 31.4–37.4)
MCV RBC AUTO: 84 FL (ref 82–98)
MICROALBUMIN UR-MCNC: <7 MG/L
MONOCYTES # BLD AUTO: 0.59 THOUSAND/ΜL (ref 0.17–1.22)
MONOCYTES NFR BLD AUTO: 8 % (ref 4–12)
NEUTROPHILS # BLD AUTO: 4.16 THOUSANDS/ΜL (ref 1.85–7.62)
NEUTS SEG NFR BLD AUTO: 54 % (ref 43–75)
NRBC BLD AUTO-RTO: 0 /100 WBCS
PLATELET # BLD AUTO: 371 THOUSANDS/UL (ref 149–390)
PMV BLD AUTO: 10.5 FL (ref 8.9–12.7)
POTASSIUM SERPL-SCNC: 4 MMOL/L (ref 3.5–5.3)
PROT SERPL-MCNC: 7 G/DL (ref 6.4–8.4)
RBC # BLD AUTO: 5.54 MILLION/UL (ref 3.88–5.62)
SODIUM SERPL-SCNC: 134 MMOL/L (ref 135–147)
TRIGL SERPL-MCNC: 150 MG/DL (ref ?–150)
TSH SERPL DL<=0.05 MIU/L-ACNC: 1.64 UIU/ML (ref 0.45–4.5)
WBC # BLD AUTO: 7.6 THOUSAND/UL (ref 4.31–10.16)

## 2025-01-17 PROCEDURE — 80053 COMPREHEN METABOLIC PANEL: CPT

## 2025-01-17 PROCEDURE — 83036 HEMOGLOBIN GLYCOSYLATED A1C: CPT

## 2025-01-17 PROCEDURE — 80061 LIPID PANEL: CPT

## 2025-01-17 PROCEDURE — 85025 COMPLETE CBC W/AUTO DIFF WBC: CPT

## 2025-01-17 PROCEDURE — 82570 ASSAY OF URINE CREATININE: CPT

## 2025-01-17 PROCEDURE — 82043 UR ALBUMIN QUANTITATIVE: CPT

## 2025-01-17 PROCEDURE — 36415 COLL VENOUS BLD VENIPUNCTURE: CPT

## 2025-01-17 PROCEDURE — 84443 ASSAY THYROID STIM HORMONE: CPT

## 2025-03-24 DIAGNOSIS — I10 ESSENTIAL HYPERTENSION: ICD-10-CM

## 2025-03-24 RX ORDER — OLMESARTAN MEDOXOMIL 40 MG/1
40 TABLET ORAL DAILY
Qty: 90 TABLET | Refills: 0 | Status: SHIPPED | OUTPATIENT
Start: 2025-03-24

## 2025-03-24 NOTE — TELEPHONE ENCOUNTER
Reason for call:   [x] Refill   [] Prior Auth  [x] Other: new pcp Veronica Mejia, appt 4.2.2025    Office:   [x] PCP/Provider - Veronica Mejia, / azucena   [] Specialty/Provider -     Medication: olmesartan (BENICAR) 40 mg tablet     Dose/Frequency: Take 1 tablet (40 mg total) by mouth daily,     Quantity: 90    Pharmacy: [facility]/pharmacy #[id number] - [place] - [address]      Local Pharmacy   Does the patient have enough for 3 days?   [] Yes   [x] No - Send as HP to POD

## 2025-04-02 ENCOUNTER — OFFICE VISIT (OUTPATIENT)
Age: 70
End: 2025-04-02
Payer: MEDICARE

## 2025-04-02 ENCOUNTER — RESULTS FOLLOW-UP (OUTPATIENT)
Age: 70
End: 2025-04-02

## 2025-04-02 VITALS
WEIGHT: 156 LBS | HEART RATE: 83 BPM | SYSTOLIC BLOOD PRESSURE: 114 MMHG | DIASTOLIC BLOOD PRESSURE: 72 MMHG | HEIGHT: 65 IN | TEMPERATURE: 97.4 F | RESPIRATION RATE: 18 BRPM | OXYGEN SATURATION: 96 % | BODY MASS INDEX: 25.99 KG/M2

## 2025-04-02 DIAGNOSIS — Z12.5 SCREENING FOR PROSTATE CANCER: ICD-10-CM

## 2025-04-02 DIAGNOSIS — J44.9 CHRONIC OBSTRUCTIVE PULMONARY DISEASE, UNSPECIFIED COPD TYPE (HCC): ICD-10-CM

## 2025-04-02 DIAGNOSIS — Z12.11 SCREENING FOR COLORECTAL CANCER: ICD-10-CM

## 2025-04-02 DIAGNOSIS — G47.00 INSOMNIA, UNSPECIFIED TYPE: ICD-10-CM

## 2025-04-02 DIAGNOSIS — Z00.00 MEDICARE ANNUAL WELLNESS VISIT, SUBSEQUENT: Primary | ICD-10-CM

## 2025-04-02 DIAGNOSIS — I10 ESSENTIAL HYPERTENSION: ICD-10-CM

## 2025-04-02 DIAGNOSIS — F33.41 RECURRENT MAJOR DEPRESSIVE DISORDER, IN PARTIAL REMISSION (HCC): ICD-10-CM

## 2025-04-02 DIAGNOSIS — E11.9 TYPE 2 DIABETES MELLITUS WITHOUT COMPLICATION, WITHOUT LONG-TERM CURRENT USE OF INSULIN (HCC): ICD-10-CM

## 2025-04-02 DIAGNOSIS — E78.2 MIXED HYPERLIPIDEMIA: ICD-10-CM

## 2025-04-02 DIAGNOSIS — Z12.12 SCREENING FOR COLORECTAL CANCER: ICD-10-CM

## 2025-04-02 PROBLEM — R35.0 URINE FREQUENCY: Status: RESOLVED | Noted: 2024-03-21 | Resolved: 2025-04-02

## 2025-04-02 PROBLEM — E66.3 OVERWEIGHT: Status: RESOLVED | Noted: 2017-11-27 | Resolved: 2025-04-02

## 2025-04-02 PROCEDURE — G0439 PPPS, SUBSEQ VISIT: HCPCS

## 2025-04-02 PROCEDURE — 92250 FUNDUS PHOTOGRAPHY W/I&R: CPT

## 2025-04-02 RX ORDER — LORATADINE 10 MG/1
10 TABLET ORAL DAILY
COMMUNITY
Start: 2024-10-02

## 2025-04-02 NOTE — PROGRESS NOTES
Diabetic Foot Exam    Patient's shoes and socks removed.    Right Foot/Ankle   Right Foot Inspection  Skin Exam: skin normal and skin intact. No dry skin, no warmth, no callus, no erythema, no maceration, no abnormal color, no pre-ulcer, no ulcer and no callus.     Toe Exam: ROM and strength within normal limits.     Sensory   Monofilament testing: intact    Vascular  Capillary refills: < 3 seconds  The right DP pulse is 2+. The right PT pulse is 2+.     Left Foot/Ankle  Left Foot Inspection  Skin Exam: skin normal and skin intact. No dry skin, no warmth, no erythema, no maceration, normal color, no pre-ulcer, no ulcer and no callus.     Toe Exam: ROM and strength within normal limits.     Sensory   Monofilament testing: intact    Vascular  Capillary refills: < 3 seconds  The left DP pulse is 2+. The left PT pulse is 2+.     Assign Risk Category  No deformity present  No loss of protective sensation  No weak pulses  Risk: 0  Name: Ken Jacques      : 1955      MRN: [id number]  Encounter Provider: Veronica Mejia PA-C  Encounter Date: 2025   Encounter department: [facility] PRIMARY C[facility] [place]  :  Assessment & Plan  Medicare annual wellness visit, subsequent    Annual physical exam completed today.  - Medical history reviewed, including existing medical conditions, medications, and surgeries.   - Labs discussed to evaluate cholesterol, blood sugar, kidney function, liver function, and other important markers of health.  - BMI evaluated and discussed.  - Cancer screenings discussed: colonoscopy is due this year.   - Vaccination status reviewed and pertinent immunizations and booster shots discussed.  He needs an updated Tdap that he can get at the pharmacy.  - Bone health reviewed.   - Skin examination.  Discussed importance of sunscreen and other preventative measures for skin cancer.    - Lifestyle and health counseling completed including diet, exercise habits, smoking status, alcohol  consumption.   - Mental health and wellbeing evaluated and discussed.  - Family history obtained to identify any of hereditary health risks.          Type 2 diabetes mellitus without complication, without long-term current use of insulin ([facility])    Lab Results   Component Value Date    HGBA1C 7.2 (H) 01/17/2025     Stable on metformin.  Is on ARB and statin.  Continue on current diabetes regimen, encouraged lower carbs/sugar.  Patient is already very active.  Will obtain lab work in 6 months.  Patient agrees to have eye exam today.  Orders:  •  IRIS Diabetic eye exam  •  Albumin / creatinine urine ratio; Future  •  Comprehensive metabolic panel; Future  •  Hemoglobin A1C; Future  •  CBC and differential; Future    Essential hypertension  Stable, continue olmestartan, diltiazem.  He has been on these medications for many years.       Chronic obstructive pulmonary disease, unspecified COPD type (HCC)  Stable, continue on Breo and rescue inhaler.  No longer a smoker (smoked cigars).       Recurrent major depressive disorder, in partial remission ([facility])  Follows with psychiatry. Dr. Keith, every three months.  He is on prozac, buspar.           Mixed hyperlipidemia  Stable, triglycerides were slightly high, but his A1c was slightly higher as well.  Encouraged lower carb diet.  Patient is already very active.  Unfortunately has a high ASCVD risk score.  Recommended aggressive risk factor reduction.  Patient is no longer smoking cigars, he stays very active, his blood pressure is well-controlled, and his diabetes is well-controlled.    The 10-year ASCVD risk score (Joe IRVING, et al., 2019) is: 28.8%    Values used to calculate the score:      Age: 69 years      Sex: Male      Is Non- : No      Diabetic: Yes      Tobacco smoker: No      Systolic Blood Pressure: 114 mmHg      Is BP treated: Yes      HDL Cholesterol: 56 mg/dL      Total Cholesterol: 192 mg/dL       Insomnia, unspecified type  Patient is  on Restoril and Ambien for severe insomnia.  He has been on this for many years.  He is monitored by his psychiatrist.  Discussed caution when using these medications due to risk for falls and confusion.  Patient has no interest in changing his medications at this time.       Screening for colorectal cancer  Patient is due for colonoscopy this year.  Likely will be his last as long as its normal.  Patient not having any symptoms or issues.  Orders:  •  Ambulatory referral to Gastroenterology; Future    Screening for prostate cancer  Will obtain with next set of labs.  Not having any urinary problems.  Orders:  •  PSA, Total Screen; Future      Depression Screening and Follow-up Plan: Patient was screened for depression during today's encounter. They screened negative with a PHQ-9 score of 3.        Preventive health issues were discussed with patient, and age appropriate screening tests were ordered as noted in patient's After Visit Summary. Personalized health advice and appropriate referrals for health education or preventive services given if needed, as noted in patient's After Visit Summary.    History of Present Illness     Pt is here for AWV.  Doing well overall.  Pt no longer follows with cardiology, his BP is well controlled.  Diabetes is well controlled as well.             Patient Care Team:  Veronica Mejia PA-C as PCP - General (Emergency Medicine)  Moise Mazariegos III, MD (Gastroenterology)    Review of Systems   Constitutional: Negative.    Respiratory: Negative.     Cardiovascular: Negative.    Musculoskeletal:  Negative for gait problem.   Neurological: Negative.    All other systems reviewed and are negative.    Medical History Reviewed by provider this encounter:  Tobacco  Allergies  Meds  Problems  Med Hx  Surg Hx  Fam Hx       Annual Wellness Visit Questionnaire   Ken is here for his Subsequent Wellness visit.     Health Risk Assessment:   Patient rates overall health as good.  Patient feels that their physical health rating is same. Patient is satisfied with their life. Eyesight was rated as same. Hearing was rated as same. Patient feels that their emotional and mental health rating is same. Patients states they are sometimes angry. Patient states they are often unusually tired/fatigued. Pain experienced in the last 7 days has been none. Patient states that he has experienced no weight loss or gain in last 6 months.     Depression Screening:   PHQ-9 Score: 3      Fall Risk Screening:   In the past year, patient has experienced: no history of falling in past year      Home Safety:  Patient does not have trouble with stairs inside or outside of their home. Patient has working smoke alarms and has working carbon monoxide detector. Home safety hazards include: none.     Nutrition:   Current diet is Regular.     Medications:   Patient is currently taking over-the-counter supplements. OTC medications include: see medication list. Patient is able to manage medications.     Activities of Daily Living (ADLs)/Instrumental Activities of Daily Living (IADLs):   Walk and transfer into and out of bed and chair?: Yes  Dress and groom yourself?: Yes    Bathe or shower yourself?: Yes    Feed yourself? Yes  Do your laundry/housekeeping?: Yes  Manage your money, pay your bills and track your expenses?: Yes  Make your own meals?: Yes    Do your own shopping?: Yes    Previous Hospitalizations:   Any hospitalizations or ED visits within the last 12 months?: Yes    How many hospitalizations have you had in the last year?: 1-2    Hospitalization Comments: ED for epistaxis.  Followed up with ENT.    Advance Care Planning:   Living will: No    Advanced directive: No      Cognitive Screening:   Provider or family/friend/caregiver concerned regarding cognition?: No    PREVENTIVE SCREENINGS      Cardiovascular Screening:    General: Screening Not Indicated and History Lipid Disorder      Diabetes Screening:      General: Screening Not Indicated and History Diabetes      Colorectal Cancer Screening:     General: Screening Current      Prostate Cancer Screening:    General: Risks and Benefits Discussed    Due for: PSA      Osteoporosis Screening:    General: Screening Not Indicated      Abdominal Aortic Aneurysm (AAA) Screening:    Risk factors include: age between 65-76 yo and tobacco use        General: Patient Declines    Due for: Screening AAA Ultrasound      Lung Cancer Screening:     General: Screening Not Indicated      Hepatitis C Screening:    General: Screening Current    Screening, Brief Intervention, and Referral to Treatment (SBIRT)     Screening  Typical number of drinks in a day: 0  Typical number of drinks in a week: 0  Interpretation: Low risk drinking behavior.    AUDIT-C Screenin) How often did you have a drink containing alcohol in the past year? never  2) How many drinks did you have on a typical day when you were drinking in the past year? 0  3) How often did you have 6 or more drinks on one occasion in the past year? never    AUDIT-C Score: 0  Interpretation: Score 0-3 (male): Negative screen for alcohol misuse    Single Item Drug Screening:  How often have you used an illegal drug (including marijuana) or a prescription medication for non-medical reasons in the past year? never    Single Item Drug Screen Score: 0  Interpretation: Negative screen for possible drug use disorder    Other Counseling Topics:   Car/seat belt/driving safety, skin self-exam, sunscreen and calcium and vitamin D intake and regular weightbearing exercise.     Social Drivers of Health     Financial Resource Strain: Low Risk  (2023)    Overall Financial Resource Strain (CARDIA)    • Difficulty of Paying Living Expenses: Not hard at all   Food Insecurity: No Food Insecurity (2025)    Hunger Vital Sign    • Worried About Running Out of Food in the Last Year: Never true    • Ran Out of Food in the Last Year: Never true  "  Transportation Needs: No Transportation Needs (4/2/2025)    PRAPARE - Transportation    • Lack of Transportation (Medical): No    • Lack of Transportation (Non-Medical): No   Housing Stability: Low Risk  (4/2/2025)    Housing Stability Vital Sign    • Unable to Pay for Housing in the Last Year: No    • Number of Times Moved in the Last Year: 0    • Homeless in the Last Year: No   Utilities: Not At Risk (4/2/2025)    [facility] Utilities    • Threatened with loss of utilities: No     No results found.    Objective   /72 (BP Location: Left arm, Patient Position: Sitting, Cuff Size: Large)   Pulse 83   Temp (!) 97.4 °F (36.3 °C) (Tympanic)   Resp 18   Ht 5' 5\" (1.651 m)   Wt 70.8 kg (156 lb)   SpO2 96%   BMI 25.96 kg/m²     Physical Exam  Constitutional:       General: He is not in acute distress.     Appearance: Normal appearance. He is not toxic-appearing.   HENT:      Head: Normocephalic and atraumatic.      Right Ear: Hearing, tympanic membrane, ear canal and external ear normal.      Left Ear: Hearing, tympanic membrane, ear canal and external ear normal.      Nose: Nose normal.      Mouth/Throat:      Lips: Pink.      Mouth: Mucous membranes are moist. No oral lesions.      Dentition: Normal dentition.      Pharynx: Oropharynx is clear.   Eyes:      General: Lids are normal. No scleral icterus.     Conjunctiva/sclera: Conjunctivae normal.      Pupils: Pupils are equal, round, and reactive to light.   Neck:      Thyroid: No thyroid mass, thyromegaly or thyroid tenderness.      Vascular: No carotid bruit.   Cardiovascular:      Rate and Rhythm: Normal rate and regular rhythm.      Pulses: no weak pulses.           Radial pulses are 2+ on the right side and 2+ on the left side.        Dorsalis pedis pulses are 2+ on the right side and 2+ on the left side.        Posterior tibial pulses are 2+ on the right side and 2+ on the left side.      Heart sounds: Normal heart sounds.   Pulmonary:      Effort: " Pulmonary effort is normal. No respiratory distress.      Breath sounds: Normal breath sounds.   Abdominal:      General: Bowel sounds are normal. There is no distension.      Palpations: Abdomen is soft.      Tenderness: There is no abdominal tenderness.   Musculoskeletal:      Cervical back: Normal range of motion and neck supple.      Right lower leg: No edema.      Left lower leg: No edema.   Feet:      Right foot:      Skin integrity: No ulcer, skin breakdown, erythema, warmth, callus or dry skin.      Toenail Condition: Right toenails are normal.      Left foot:      Skin integrity: No ulcer, skin breakdown, erythema, warmth, callus or dry skin.      Toenail Condition: Left toenails are normal.   Lymphadenopathy:      Cervical: No cervical adenopathy.   Skin:     General: Skin is warm and dry.      Coloration: Skin is not jaundiced.   Neurological:      Mental Status: He is alert and oriented to person, place, and time.      Motor: No weakness or tremor.      Gait: Gait is intact.   Psychiatric:         Mood and Affect: Mood normal.         Behavior: Behavior is cooperative.         Cognition and Memory: Cognition and memory normal.

## 2025-04-02 NOTE — ASSESSMENT & PLAN NOTE
Lab Results   Component Value Date    HGBA1C 7.2 (H) 01/17/2025     Stable on metformin.  Is on ARB and statin.  Continue on current diabetes regimen, encouraged lower carbs/sugar.  Patient is already very active.  Will obtain lab work in 6 months.  Patient agrees to have eye exam today.  Orders:  •  IRIS Diabetic eye exam  •  Albumin / creatinine urine ratio; Future  •  Comprehensive metabolic panel; Future  •  Hemoglobin A1C; Future  •  CBC and differential; Future

## 2025-04-02 NOTE — PATIENT INSTRUCTIONS
You are due for tetanus vaccine.    Medicare Preventive Visit Patient Instructions  Thank you for completing your Welcome to Medicare Visit or Medicare Annual Wellness Visit today. Your next wellness visit will be due in one year (4/3/2026).  The screening/preventive services that you may require over the next 5-10 years are detailed below. Some tests may not apply to you based off risk factors and/or age. Screening tests ordered at today's visit but not completed yet may show as past due. Also, please note that scanned in results may not display below.  Preventive Screenings:  Service Recommendations Previous Testing/Comments   Colorectal Cancer Screening  Colonoscopy    Fecal Occult Blood Test (FOBT)/Fecal Immunochemical Test (FIT)  Fecal DNA/Cologuard Test  Flexible Sigmoidoscopy Age: 45-75 years old   Colonoscopy: every 10 years (May be performed more frequently if at higher risk)  OR  FOBT/FIT: every 1 year  OR  Cologuard: every 3 years  OR  Sigmoidoscopy: every 5 years  Screening may be recommended earlier than age 45 if at higher risk for colorectal cancer. Also, an individualized decision between you and your healthcare provider will decide whether screening between the ages of 76-85 would be appropriate. Colonoscopy: 06/10/2015  FOBT/FIT: Not on file  Cologuard: Not on file  Sigmoidoscopy: Not on file    Screening Current     Prostate Cancer Screening Individualized decision between patient and health care provider in men between ages of 55-69   Medicare will cover every 12 months beginning on the day after your 50th birthday PSA: 3.9 ng/mL           Hepatitis C Screening Once for adults born between 1945 and 1965  More frequently in patients at high risk for Hepatitis C Hep C Antibody: 03/16/2020    Screening Current   Diabetes Screening 1-2 times per year if you're at risk for diabetes or have pre-diabetes Fasting glucose: 128 mg/dL (1/17/2025)  A1C: 7.2 % (1/17/2025)  Screening Not Indicated  History  Diabetes   Cholesterol Screening Once every 5 years if you don't have a lipid disorder. May order more often based on risk factors. Lipid panel: 01/17/2025  Screening Not Indicated  History Lipid Disorder      Other Preventive Screenings Covered by Medicare:  Abdominal Aortic Aneurysm (AAA) Screening: covered once if your at risk. You're considered to be at risk if you have a family history of AAA or a male between the age of 65-75 who smoking at least 100 cigarettes in your lifetime.  Lung Cancer Screening: covers low dose CT scan once per year if you meet all of the following conditions: (1) Age 55-77; (2) No signs or symptoms of lung cancer; (3) Current smoker or have quit smoking within the last 15 years; (4) You have a tobacco smoking history of at least 20 pack years (packs per day x number of years you smoked); (5) You get a written order from a healthcare provider.  Glaucoma Screening: covered annually if you're considered high risk: (1) You have diabetes OR (2) Family history of glaucoma OR (3)  aged 50 and older OR (4)  American aged 65 and older  Osteoporosis Screening: covered every 2 years if you meet one of the following conditions: (1) Have a vertebral abnormality; (2) On glucocorticoid therapy for more than 3 months; (3) Have primary hyperparathyroidism; (4) On osteoporosis medications and need to assess response to drug therapy.  HIV Screening: covered annually if you're between the age of 15-65. Also covered annually if you are younger than 15 and older than 65 with risk factors for HIV infection. For pregnant patients, it is covered up to 3 times per pregnancy.    Immunizations:  Immunization Recommendations   Influenza Vaccine Annual influenza vaccination during flu season is recommended for all persons aged >= 6 months who do not have contraindications   Pneumococcal Vaccine   * Pneumococcal conjugate vaccine = PCV13 (Prevnar 13), PCV15 (Vaxneuvance), PCV20 (Prevnar  20)  * Pneumococcal polysaccharide vaccine = PPSV23 (Pneumovax) Adults 19-65 yo with certain risk factors or if 65+ yo  If never received any pneumonia vaccine: recommend Prevnar 20 (PCV20)  Give PCV20 if previously received 1 dose of PCV13 or PPSV23   Hepatitis B Vaccine 3 dose series if at intermediate or high risk (ex: diabetes, end stage renal disease, liver disease)   Respiratory syncytial virus (RSV) Vaccine - COVERED BY MEDICARE PART D  * RSVPreF3 (Arexvy) CDC recommends that adults 60 years of age and older may receive a single dose of RSV vaccine using shared clinical decision-making (SCDM)   Tetanus (Td) Vaccine - COST NOT COVERED BY MEDICARE PART B Following completion of primary series, a booster dose should be given every 10 years to maintain immunity against tetanus. Td may also be given as tetanus wound prophylaxis.   Tdap Vaccine - COST NOT COVERED BY MEDICARE PART B Recommended at least once for all adults. For pregnant patients, recommended with each pregnancy.   Shingles Vaccine (Shingrix) - COST NOT COVERED BY MEDICARE PART B  2 shot series recommended in those 19 years and older who have or will have weakened immune systems or those 50 years and older     Health Maintenance Due:      Topic Date Due   • Colorectal Cancer Screening  06/10/2025   • Hepatitis C Screening  Completed     Immunizations Due:      Topic Date Due   • COVID-19 Vaccine (4 - 2024-25 season) 09/01/2024     Advance Directives   What are advance directives?  Advance directives are legal documents that state your wishes and plans for medical care. These plans are made ahead of time in case you lose your ability to make decisions for yourself. Advance directives can apply to any medical decision, such as the treatments you want, and if you want to donate organs.   What are the types of advance directives?  There are many types of advance directives, and each state has rules about how to use them. You may choose a combination of  any of the following:  Living will:  This is a written record of the treatment you want. You can also choose which treatments you do not want, which to limit, and which to stop at a certain time. This includes surgery, medicine, IV fluid, and tube feedings.   Durable power of  for healthcare (DPAHC):  This is a written record that states who you want to make healthcare choices for you when you are unable to make them for yourself. This person, called a proxy, is usually a family member or a friend. You may choose more than 1 proxy.  Do not resuscitate (DNR) order:  A DNR order is used in case your heart stops beating or you stop breathing. It is a request not to have certain forms of treatment, such as CPR. A DNR order may be included in other types of advance directives.  Medical directive:  This covers the care that you want if you are in a coma, near death, or unable to make decisions for yourself. You can list the treatments you want for each condition. Treatment may include pain medicine, surgery, blood transfusions, dialysis, IV or tube feedings, and a ventilator (breathing machine).  Values history:  This document has questions about your views, beliefs, and how you feel and think about life. This information can help others choose the care that you would choose.  Why are advance directives important?  An advance directive helps you control your care. Although spoken wishes may be used, it is better to have your wishes written down. Spoken wishes can be misunderstood, or not followed. Treatments may be given even if you do not want them. An advance directive may make it easier for your family to make difficult choices about your care.   Weight Management   Why it is important to manage your weight:  Being overweight increases your risk of health conditions such as heart disease, high blood pressure, type 2 diabetes, and certain types of cancer. It can also increase your risk for osteoarthritis, sleep  apnea, and other respiratory problems. Aim for a slow, steady weight loss. Even a small amount of weight loss can lower your risk of health problems.  How to lose weight safely:  A safe and healthy way to lose weight is to eat fewer calories and get regular exercise. You can lose up about 1 pound a week by decreasing the number of calories you eat by 500 calories each day.   Healthy meal plan for weight management:  A healthy meal plan includes a variety of foods, contains fewer calories, and helps you stay healthy. A healthy meal plan includes the following:  Eat whole-grain foods more often.  A healthy meal plan should contain fiber. Fiber is the part of grains, fruits, and vegetables that is not broken down by your body. Whole-grain foods are healthy and provide extra fiber in your diet. Some examples of whole-grain foods are whole-wheat breads and pastas, oatmeal, brown rice, and bulgur.  Eat a variety of vegetables every day.  Include dark, leafy greens such as spinach, kale, kailey greens, and mustard greens. Eat yellow and orange vegetables such as carrots, sweet potatoes, and winter squash.   Eat a variety of fruits every day.  Choose fresh or canned fruit (canned in its own juice or light syrup) instead of juice. Fruit juice has very little or no fiber.  Eat low-fat dairy foods.  Drink fat-free (skim) milk or 1% milk. Eat fat-free yogurt and low-fat cottage cheese. Try low-fat cheeses such as mozzarella and other reduced-fat cheeses.  Choose meat and other protein foods that are low in fat.  Choose beans or other legumes such as split peas or lentils. Choose fish, skinless poultry (chicken or turkey), or lean cuts of red meat (beef or pork). Before you cook meat or poultry, cut off any visible fat.   Use less fat and oil.  Try baking foods instead of frying them. Add less fat, such as margarine, sour cream, regular salad dressing and mayonnaise to foods. Eat fewer high-fat foods. Some examples of high-fat  foods include french fries, doughnuts, ice cream, and cakes.  Eat fewer sweets.  Limit foods and drinks that are high in sugar. This includes candy, cookies, regular soda, and sweetened drinks.  Exercise:  Exercise at least 30 minutes per day on most days of the week. Some examples of exercise include walking, biking, dancing, and swimming. You can also fit in more physical activity by taking the stairs instead of the elevator or parking farther away from stores. Ask your healthcare provider about the best exercise plan for you.      © Copyright [employer] 2018 Information is for End User's use only and may not be sold, redistributed or otherwise used for commercial purposes. All illustrations and images included in CareNotes® are the copyrighted property of A.D.A.M., Inc. or [employer]

## 2025-04-02 NOTE — ASSESSMENT & PLAN NOTE
Stable, triglycerides were slightly high, but his A1c was slightly higher as well.  Encouraged lower carb diet.  Patient is already very active.  Unfortunately has a high ASCVD risk score.  Recommended aggressive risk factor reduction.  Patient is no longer smoking cigars, he stays very active, his blood pressure is well-controlled, and his diabetes is well-controlled.    The 10-year ASCVD risk score (Joe IRVING, et al., 2019) is: 28.8%    Values used to calculate the score:      Age: 69 years      Sex: Male      Is Non- : No      Diabetic: Yes      Tobacco smoker: No      Systolic Blood Pressure: 114 mmHg      Is BP treated: Yes      HDL Cholesterol: 56 mg/dL      Total Cholesterol: 192 mg/dL

## 2025-04-03 ENCOUNTER — TELEPHONE (OUTPATIENT)
Dept: ADMINISTRATIVE | Facility: OTHER | Age: 70
End: 2025-04-03

## 2025-04-03 NOTE — LETTER
Diabetic Eye Exam Form    Date Requested: 25  Patient: Ken Jacques  Patient : 1955   Referring Provider: Veronica Mejia PA-C      DIABETIC Eye Exam Date _______________________________      Type of Exam MUST be documented for Diabetic Eye Exams. Please CHECK ONE.     Retinal Exam       Dilated Retinal Exam       OCT       Optomap-Iris Exam      Fundus Photography       Left Eye - Please check Retinopathy or No Retinopathy        Exam did show retinopathy    Exam did not show retinopathy       Right Eye - Please check Retinopathy or No Retinopathy       Exam did show retinopathy    Exam did not show retinopathy       Comments ____Within 2024  ______________________________________________________    Practice Providing Exam ______________________________________________    Exam Performed By (print name) _______________________________________      Provider Signature ___________________________________________________      These reports are needed for  compliance.    Please fax this completed form and a copy of the Diabetic Eye Exam report to the [facility] Based Department as soon as possible via Fax [phone], attention Neisha: Phone [phone]. Our office is located at [address] [place] [address] [place], [place] [facility].     We thank you for your assistance in treating our mutual patient.

## 2025-04-03 NOTE — LETTER
Diabetic Eye Exam Form    Date Requested: 04/10/25  Patient: Ken Jacques  Patient : 1955   Referring Provider: Veronica Mejia PA-C      DIABETIC Eye Exam Date _______________________________      Type of Exam MUST be documented for Diabetic Eye Exams. Please CHECK ONE.     Retinal Exam       Dilated Retinal Exam       OCT       Optomap-Iris Exam      Fundus Photography       Left Eye - Please check Retinopathy or No Retinopathy        Exam did show retinopathy    Exam did not show retinopathy       Right Eye - Please check Retinopathy or No Retinopathy       Exam did show retinopathy    Exam did not show retinopathy       Comments __Within -July  ________________________________________________________    Practice Providing Exam ______________________________________________    Exam Performed By (print name) _______________________________________      Provider Signature ___________________________________________________      These reports are needed for  compliance.    Please fax this completed form and a copy of the Diabetic Eye Exam report to the [facility] Based Department as soon as possible via Fax [phone], attention Neisha: Phone [phone]. Our office is located at [address] [place] [address] [place] [address].     We thank you for your assistance in treating our mutual patient.

## 2025-04-03 NOTE — TELEPHONE ENCOUNTER
Upon review of the In Basket request and the patient's chart, initial outreach has been made via fax to facility. Please see Contacts section for details.     Thank you  Neisha Cuello MA

## 2025-04-03 NOTE — TELEPHONE ENCOUNTER
----- Message from Veronica Mejia PA-C sent at 4/2/2025  4:04 PM EDT -----  Regarding: care gap request  04/02/25 4:04 PM    Hello, our patient attached above has had Diabetic Eye Exam completed/performed. Please assist in updating the patient chart by making an External outreach to [facility] facility located in [place] on [address]. The date of service is July 2024.    Thank you,  Veronica Mejia PA-C   [facility] [place]

## 2025-04-10 NOTE — TELEPHONE ENCOUNTER
As a follow-up, a second attempt has been made for outreach via fax to facility. Please see Contacts section for details.    Thank you  Neisha Cuello MA

## 2025-04-15 ENCOUNTER — TELEPHONE (OUTPATIENT)
Age: 70
End: 2025-04-15

## 2025-04-15 NOTE — TELEPHONE ENCOUNTER
As a final attempt, a third outreach has been made via telephone call to facility. Please see Contacts section for details. This encounter will be closed and completed by end of day. Should we receive the requested information because of previous outreach attempts, the requested patient's chart will be updated appropriately. Left message with the office to fax over report.     Thank you  Neisha Cuello MA

## 2025-04-15 NOTE — TELEPHONE ENCOUNTER
Upon review of the In Basket request we were able to locate, review, and update the patient chart as requested for Diabetic Eye Exam.    Any additional questions or concerns should be emailed to the Practice Liaisons via the appropriate education email address, please do not reply via In Basket.    Thank you  Neisha Cuello MA   [facility]

## 2025-04-19 DIAGNOSIS — J44.9 CHRONIC OBSTRUCTIVE PULMONARY DISEASE, UNSPECIFIED COPD TYPE (HCC): ICD-10-CM

## 2025-04-19 RX ORDER — FLUTICASONE FUROATE AND VILANTEROL TRIFENATATE 200; 25 UG/1; UG/1
POWDER RESPIRATORY (INHALATION)
Qty: 180 EACH | Refills: 1 | Status: SHIPPED | OUTPATIENT
Start: 2025-04-19

## 2025-05-16 ENCOUNTER — PREP FOR PROCEDURE (OUTPATIENT)
Age: 70
End: 2025-05-16

## 2025-05-16 ENCOUNTER — TELEPHONE (OUTPATIENT)
Age: 70
End: 2025-05-16

## 2025-05-16 DIAGNOSIS — J44.9 CHRONIC OBSTRUCTIVE PULMONARY DISEASE, UNSPECIFIED COPD TYPE (HCC): ICD-10-CM

## 2025-05-16 DIAGNOSIS — I10 ESSENTIAL HYPERTENSION: ICD-10-CM

## 2025-05-16 DIAGNOSIS — Z12.11 SCREENING FOR COLON CANCER: Primary | ICD-10-CM

## 2025-05-16 NOTE — TELEPHONE ENCOUNTER
Scheduled date of colonoscopy (as of today): 6/2/25  Physician performing colonoscopy: Dr. Roberto  Location of colonoscopy: [facility]  Bowel prep reviewed with patient: cuong becerra/issac/diabetic  Instructions reviewed with patient by: Tika JOHNSTON, sent via email [email or web address] per pt.  Clearances: N/A      Recall 6/2025

## 2025-05-16 NOTE — TELEPHONE ENCOUNTER
05/16/25  Screened by: Tika Workman MA    Referring Provider ABY Mejia    Pre- Screening:     There is no height or weight on file to calculate BMI.  Has patient been referred for a routine screening Colonoscopy? yes  Is the patient between 45-75 years old? yes      Previous Colonoscopy yes   If yes:    Date: years ago    Facility:     Reason:       Does the patient want to see a Gastroenterologist prior to their procedure OR are they having any GI symptoms? no    Has the patient been hospitalized or had abdominal surgery in the past 6 months? no    Does the patient use supplemental oxygen? no    Does the patient take Coumadin, Lovenox, Plavix, Elliquis, Xarelto, or other blood thinning medication? no    Has the patient had a stroke, cardiac event, or stent placed in the past year? no      If patient is between 45yrs - 49yrs, please advise patient that we will have to confirm benefits & coverage with their insurance company for a routine screening colonoscopy.

## 2025-05-19 DIAGNOSIS — J44.9 CHRONIC OBSTRUCTIVE PULMONARY DISEASE, UNSPECIFIED COPD TYPE (HCC): ICD-10-CM

## 2025-05-19 RX ORDER — DILTIAZEM HYDROCHLORIDE 180 MG/1
180 CAPSULE, COATED, EXTENDED RELEASE ORAL DAILY
Qty: 90 CAPSULE | Refills: 1 | Status: SHIPPED | OUTPATIENT
Start: 2025-05-19

## 2025-05-19 RX ORDER — ALBUTEROL SULFATE 90 UG/1
INHALANT RESPIRATORY (INHALATION)
Qty: 18 G | Refills: 5 | Status: SHIPPED | OUTPATIENT
Start: 2025-05-19 | End: 2025-05-19

## 2025-05-19 RX ORDER — ALBUTEROL SULFATE 90 UG/1
INHALANT RESPIRATORY (INHALATION)
Qty: 18 G | Refills: 5 | Status: SHIPPED | OUTPATIENT
Start: 2025-05-19

## 2025-05-27 ENCOUNTER — TELEPHONE (OUTPATIENT)
Age: 70
End: 2025-05-27

## 2025-05-27 DIAGNOSIS — I11.9 HYPERTENSIVE HEART DISEASE, UNSPECIFIED WHETHER HEART FAILURE PRESENT: Primary | ICD-10-CM

## 2025-05-27 DIAGNOSIS — J44.9 CHRONIC OBSTRUCTIVE PULMONARY DISEASE, UNSPECIFIED COPD TYPE (HCC): ICD-10-CM

## 2025-05-27 RX ORDER — LOSARTAN POTASSIUM 100 MG/1
100 TABLET ORAL DAILY
Qty: 90 TABLET | Refills: 1 | Status: SHIPPED | OUTPATIENT
Start: 2025-05-27

## 2025-05-27 RX ORDER — FLUTICASONE FUROATE AND VILANTEROL 200; 25 UG/1; UG/1
1 POWDER RESPIRATORY (INHALATION) DAILY
Qty: 180 EACH | Refills: 1 | Status: SHIPPED | OUTPATIENT
Start: 2025-05-27 | End: 2025-05-27 | Stop reason: ALTCHOICE

## 2025-05-27 RX ORDER — FLUTICASONE PROPIONATE AND SALMETEROL XINAFOATE 45; 21 UG/1; UG/1
2 AEROSOL, METERED RESPIRATORY (INHALATION) 2 TIMES DAILY
Qty: 36 G | Refills: 1 | Status: SHIPPED | OUTPATIENT
Start: 2025-05-27 | End: 2025-06-02

## 2025-05-27 NOTE — TELEPHONE ENCOUNTER
Pharmacy states they dont have a script on file    Reason for call:   [x] Refill   [] Prior Auth  [] Other:     Office:   [x] PCP/Provider - Veronica Mejia   [] Specialty/Provider -     Medication:   Breo Ellipta 200-25 MCG/ACT inhaler     Dose/Frequency:  INHALE 1 PUFF DAILY RINSE MOUTH AFTER USE     Quantity: 180    Pharmacy: [facility]    Local Pharmacy   Does the patient have enough for 3 days?   [] Yes   [x] No - Send as HP to POD

## 2025-05-27 NOTE — TELEPHONE ENCOUNTER
Spoke with patient and he needs an alternative of Breo inhaler as per pharmacy  and losartan in replace of the olmestartan

## 2025-05-27 NOTE — TELEPHONE ENCOUNTER
"Patient is calling for a refill of \"losartan\". I do not see medication on patients medication list. Per last note patient is taking Olemsartan and diltiazem. Patient states the pharmacy told him they are going to start giving him this medication, not sure what patient meant by that and he could not elaborate.  Please advise.  "

## 2025-06-02 ENCOUNTER — ANESTHESIA (OUTPATIENT)
Dept: GASTROENTEROLOGY | Facility: HOSPITAL | Age: 70
End: 2025-06-02
Payer: MEDICARE

## 2025-06-02 ENCOUNTER — TELEPHONE (OUTPATIENT)
Age: 70
End: 2025-06-02

## 2025-06-02 ENCOUNTER — HOSPITAL ENCOUNTER (OUTPATIENT)
Dept: GASTROENTEROLOGY | Facility: HOSPITAL | Age: 70
Setting detail: OUTPATIENT SURGERY
Discharge: HOME | End: 2025-06-02
Attending: INTERNAL MEDICINE
Payer: MEDICARE

## 2025-06-02 ENCOUNTER — ANESTHESIA EVENT (OUTPATIENT)
Dept: GASTROENTEROLOGY | Facility: HOSPITAL | Age: 70
End: 2025-06-02
Payer: MEDICARE

## 2025-06-02 VITALS
TEMPERATURE: 98.7 F | SYSTOLIC BLOOD PRESSURE: 145 MMHG | HEART RATE: 60 BPM | OXYGEN SATURATION: 95 % | WEIGHT: 156.53 LBS | DIASTOLIC BLOOD PRESSURE: 90 MMHG | RESPIRATION RATE: 18 BRPM | HEIGHT: 66 IN | BODY MASS INDEX: 25.16 KG/M2

## 2025-06-02 DIAGNOSIS — J44.9 CHRONIC OBSTRUCTIVE PULMONARY DISEASE, UNSPECIFIED COPD TYPE (HCC): ICD-10-CM

## 2025-06-02 DIAGNOSIS — J44.9 CHRONIC OBSTRUCTIVE PULMONARY DISEASE, UNSPECIFIED COPD TYPE (HCC): Primary | ICD-10-CM

## 2025-06-02 DIAGNOSIS — Z12.11 SCREENING FOR COLON CANCER: ICD-10-CM

## 2025-06-02 LAB — GLUCOSE SERPL-MCNC: 147 MG/DL (ref 65–140)

## 2025-06-02 PROCEDURE — 82948 REAGENT STRIP/BLOOD GLUCOSE: CPT

## 2025-06-02 PROCEDURE — 45385 COLONOSCOPY W/LESION REMOVAL: CPT | Performed by: INTERNAL MEDICINE

## 2025-06-02 PROCEDURE — 88305 TISSUE EXAM BY PATHOLOGIST: CPT | Performed by: PATHOLOGY

## 2025-06-02 RX ORDER — SODIUM CHLORIDE, SODIUM LACTATE, POTASSIUM CHLORIDE, CALCIUM CHLORIDE 600; 310; 30; 20 MG/100ML; MG/100ML; MG/100ML; MG/100ML
75 INJECTION, SOLUTION INTRAVENOUS CONTINUOUS
Status: DISCONTINUED | OUTPATIENT
Start: 2025-06-02 | End: 2025-06-06 | Stop reason: HOSPADM

## 2025-06-02 RX ORDER — BUDESONIDE AND FORMOTEROL FUMARATE DIHYDRATE 160; 4.5 UG/1; UG/1
2 AEROSOL RESPIRATORY (INHALATION) 2 TIMES DAILY
Qty: 10.2 G | Refills: 5 | Status: SHIPPED | OUTPATIENT
Start: 2025-06-02 | End: 2025-06-03

## 2025-06-02 RX ORDER — SODIUM CHLORIDE, SODIUM LACTATE, POTASSIUM CHLORIDE, CALCIUM CHLORIDE 600; 310; 30; 20 MG/100ML; MG/100ML; MG/100ML; MG/100ML
INJECTION, SOLUTION INTRAVENOUS CONTINUOUS PRN
Status: DISCONTINUED | OUTPATIENT
Start: 2025-06-02 | End: 2025-06-02

## 2025-06-02 RX ORDER — PROPOFOL 10 MG/ML
INJECTION, EMULSION INTRAVENOUS AS NEEDED
Status: DISCONTINUED | OUTPATIENT
Start: 2025-06-02 | End: 2025-06-02

## 2025-06-02 RX ORDER — LIDOCAINE HYDROCHLORIDE 20 MG/ML
INJECTION, SOLUTION EPIDURAL; INFILTRATION; INTRACAUDAL; PERINEURAL AS NEEDED
Status: DISCONTINUED | OUTPATIENT
Start: 2025-06-02 | End: 2025-06-02

## 2025-06-02 RX ORDER — FLUTICASONE PROPIONATE AND SALMETEROL XINAFOATE 45; 21 UG/1; UG/1
AEROSOL, METERED RESPIRATORY (INHALATION)
Refills: 0 | Status: CANCELLED | OUTPATIENT
Start: 2025-06-02

## 2025-06-02 RX ADMIN — SODIUM CHLORIDE, SODIUM LACTATE, POTASSIUM CHLORIDE, AND CALCIUM CHLORIDE: .6; .31; .03; .02 INJECTION, SOLUTION INTRAVENOUS at 07:12

## 2025-06-02 RX ADMIN — PROPOFOL 50 MG: 10 INJECTION, EMULSION INTRAVENOUS at 07:56

## 2025-06-02 RX ADMIN — PROPOFOL 50 MG: 10 INJECTION, EMULSION INTRAVENOUS at 07:50

## 2025-06-02 RX ADMIN — PROPOFOL 100 MG: 10 INJECTION, EMULSION INTRAVENOUS at 07:47

## 2025-06-02 RX ADMIN — LIDOCAINE HYDROCHLORIDE 100 MG: 20 INJECTION, SOLUTION EPIDURAL; INFILTRATION; INTRACAUDAL; PERINEURAL at 07:47

## 2025-06-02 NOTE — ANESTHESIA PREPROCEDURE EVALUATION
Procedure:  COLONOSCOPY    Relevant Problems   CARDIO   (+) Cardiac arrhythmia   (+) Ebstein anomaly   (+) Essential hypertension   (+) Mixed hyperlipidemia   (+) Nonrheumatic pulmonary valve insufficiency   (+) Tricuspid valve disorders, non-rheumatic      ENDO   (+) Type 2 diabetes mellitus, without long-term current use of insulin (HCC)      GI/HEPATIC   (+) Esophageal dysphagia      NEURO/PSYCH   (+) Generalized anxiety disorder   (+) Recurrent major depressive disorder, in partial remission (HCC)      PULMONARY   (+) Chronic obstructive pulmonary disease (HCC)      TTE 2018  LEFT VENTRICLE:  Systolic function was normal. Ejection fraction was estimated to be 60 %.  There were no regional wall motion abnormalities.  Doppler parameters were consistent with abnormal left ventricular relaxation (grade 1 diastolic dysfunction).     RIGHT VENTRICLE:  Systolic function was normal.     MITRAL VALVE:  There was trace regurgitation.     TRICUSPID VALVE:  There was mild apical displacement suggestive of mild Ebstein's anamoly.  There was trace regurgitation.  Pulmonary artery systolic pressure was within the normal range.  Physical Exam    Airway     Mallampati score: II  TM Distance: >3 FB  Neck ROM: full      Cardiovascular      Dental       Pulmonary      Neurological    He appears awake, alert and oriented x3.      Other Findings  post-pubertal.      Anesthesia Plan  ASA Score- 3     Anesthesia Type- IV sedation with anesthesia with ASA Monitors.         Additional Monitors:     Airway Plan:     Comment: Recent labs personally reviewed:  Lab Results       Component                Value               Date                       WBC                      7.60                01/17/2025                 HGB                      14.9                01/17/2025                 PLT                      371                 01/17/2025            Lab Results       Component                Value               Date                     "   NA                       142                 10/19/2015                 K                        4.0                 01/17/2025                 BUN                      14                  01/17/2025                 CREATININE               0.89                01/17/2025                 GLUCOSE                  142 (H)             10/19/2015            No results found for: \"PTT\"   No results found for: \"INR\"    Blood type     Patient was consented for sedation with IV anesthetic. Discussed that we will maintain spontaneous respirations and utilize supplemental O2. I discussed the risks of aspiration, hypoxia, laryngospasm and bronchospasm. I discussed the scenarios related to conversion to general anesthetic. All questions answered.     I, Sabi Avendaño MD, have personally seen and evaluated the patient prior to anesthetic care.  I have reviewed the pre-anesthetic record, medical history, allergies, medications and any other medical records if appropriate to the anesthetic care.  If a CRNA is involved in the case, I have reviewed the CRNA assessment, if present, and agree. Patient consented for IV Sedation, general anesthesia as back up. Discussed risks of aspiration, IV infiltration, indications for conversion to general anesthesia. All questions and concerns addressed.   .       Plan Factors-Exercise tolerance (METS): >4 METS.    Chart reviewed. EKG reviewed. Imaging results reviewed. Existing labs reviewed. Patient summary reviewed.    Patient is not a current smoker.  Patient did not smoke on day of surgery.    Obstructive sleep apnea risk education given perioperatively.        Induction- intravenous.    Postoperative Plan- .   Monitoring Plan - Monitoring plan - standard ASA monitoring          Informed Consent- Anesthetic plan and risks discussed with patient.  I personally reviewed this patient with the CRNA. Discussed and agreed on the Anesthesia Plan with the CRNA..      NPO Status:  Vitals Value Taken " Time   Date of last liquid 06/02/25 06/02/25 07:05   Time of last liquid 0030 06/02/25 07:05   Date of last solid 06/01/25 06/02/25 07:05   Time of last solid 1000 06/02/25 07:05

## 2025-06-02 NOTE — TELEPHONE ENCOUNTER
Patient is calling regarding fluticasone-salmeterol (Advair HFA) 45-21 MCG/ACT inhaler. Patient states this is not covered by insurance and needs new inhaler sent to [facility]

## 2025-06-02 NOTE — H&P
"History and Physical -  [facility]  Ken Jacques 70 y.o. male MRN: [id number]                  HPI: Ken Jacques is a 70 y.o. year old male who presents for colonoscopy for colon cancer screening.      REVIEW OF SYSTEMS: Per the HPI, and otherwise unremarkable.    Historical Information   Past Medical History[1]  Past Surgical History[2]  Social History   Social History     Substance and Sexual Activity   Alcohol Use No    Comment: rarely     Social History     Substance and Sexual Activity   Drug Use No     Tobacco Use History[3]  Family History[4]    Meds/Allergies     Current Medications[5]    Allergies[6]    Objective     /94   Pulse 71   Temp 97.5 °F (36.4 °C) (Temporal)   Resp 14   Ht 5' 6\" (1.676 m)   Wt 71 kg (156 lb 8.4 oz)   SpO2 96%   BMI 25.26 kg/m²       PHYSICAL EXAM    Gen: NAD  Head: NCAT  CV: RRR  CHEST: Clear  ABD: soft, NT/ND  EXT: no edema      ASSESSMENT/PLAN: Ken Jacques is a 70 y.o. year old male who presents for colonoscopy for colon cancer screening. The patient is stable and optimized for the procedure, we reviewed risk and benefits. Risk include but not limited to infection, bleeding, perforation and missing a lesion.               [1]   Past Medical History:  Diagnosis Date    Abnormal EKG     Alcohol abuse     Allergic rhinitis     last assessed: 6/4/2014    Anxiety     Asthma     last assesssed: 6/4/2014    Atresia of esophagus with tracheo-esophageal fistula     Benign neoplasm of colon     Campylobacter enteritis     last assessed: 5/6/2015    Cardiac arrhythmia     last assessed: 6/19/2015    Chronic cough     last assessed: 1/29/2015    Chronic fatigue syndrome     resolved: 6/4/2014    Chronic sinusitis     resolved: 6/4/2014    COPD (chronic obstructive pulmonary disease) (HCC)     Diabetes mellitus (HCC)     Digestive symptom     EKG, abnormal     Epistaxis     Generalized osteoarthritis     resolved: 6/4/2014    GERD without esophagitis " 2014    Heart disease     HTN (hypertension)     Hyperlipidemia     Myalgia     Myositis     Poisoning by drug or medicinal substance     Sleep apnea    [2]   Past Surgical History:  Procedure Laterality Date    COLONOSCOPY      ESOPHAGOGASTRODUODENOSCOPY N/A 10/2/2018    Procedure: ESOPHAGOGASTRODUODENOSCOPY (EGD);  Surgeon: Joycelyn Lainez MD;  Location: [facility];  Service: Gastroenterology    FOOT SURGERY      NH ESOPHAGOGASTRODUODENOSCOPY TRANSORAL DIAGNOSTIC N/A 2018    Procedure: ESOPHAGOGASTRODUODENOSCOPY (EGD);  Surgeon: Moise Mazariegos III, MD;  Location: [facility];  Service: Gastroenterology    TONSILLECTOMY     [3]   Social History  Tobacco Use   Smoking Status Former    Current packs/day: 0.00    Types: Cigarettes    Quit date: 10/2/2006    Years since quittin.6   Smokeless Tobacco Never   Tobacco Comments    cigars-social   [4]   Family History  Problem Relation Name Age of Onset    Asthma Mother      Cancer Half-Sister          unknown type   [5]   Current Outpatient Medications:     fluticasone-salmeterol (Advair HFA) 45-21 MCG/ACT inhaler    losartan (COZAAR) 100 MG tablet    albuterol (PROVENTIL HFA,VENTOLIN HFA) 90 mcg/act inhaler    aspirin (ECOTRIN LOW STRENGTH) 81 mg EC tablet    atorvastatin (LIPITOR) 10 mg tablet    Blood Glucose Monitoring Suppl (ONE TOUCH ULTRA MINI) w/Device KIT    busPIRone (BUSPAR) 15 mg tablet    diltiazem (CARDIZEM CD) 180 mg 24 hr capsule    FLUoxetine (PROzac) 40 MG capsule    glucose blood (OneTouch Ultra) test strip    ipratropium (ATROVENT) 0.03 % nasal spray    loratadine (CLARITIN) 10 mg tablet    metFORMIN (GLUCOPHAGE) 500 mg tablet    ONETOUCH DELICA LANCETS FINE MISC    temazepam (RESTORIL) 30 mg capsule    zolpidem (AMBIEN) 10 mg tablet  [6] No Known Allergies

## 2025-06-03 RX ORDER — FLUTICASONE FUROATE AND VILANTEROL 100; 25 UG/1; UG/1
1 POWDER RESPIRATORY (INHALATION) DAILY
Qty: 200 BLISTER | Refills: 1 | Status: SHIPPED | OUTPATIENT
Start: 2025-06-03 | End: 2025-11-30

## 2025-06-04 ENCOUNTER — RESULTS FOLLOW-UP (OUTPATIENT)
Dept: GASTROENTEROLOGY | Facility: CLINIC | Age: 70
End: 2025-06-04

## 2025-06-04 PROCEDURE — 88305 TISSUE EXAM BY PATHOLOGIST: CPT | Performed by: PATHOLOGY

## 2025-06-18 DIAGNOSIS — E78.2 MIXED HYPERLIPIDEMIA: ICD-10-CM

## 2025-06-18 RX ORDER — ATORVASTATIN CALCIUM 10 MG/1
10 TABLET, FILM COATED ORAL DAILY
Qty: 90 TABLET | Refills: 1 | Status: SHIPPED | OUTPATIENT
Start: 2025-06-18

## 2025-06-18 NOTE — TELEPHONE ENCOUNTER
Reason for call:   [x] Refill   [] Prior Auth  [] Other:     Office:   [x] PCP/Provider - [facility] / Veronica Mejia PA-C  [] Specialty/Provider -     Medication:   ~ atorvastatin (LIPITOR) 10 mg tablet - TAKE 1 TABLET BY MOUTH EVERY DAY     Pharmacy:   [id number] - [place] - [address]     Local Pharmacy   Does the patient have enough for 3 days?   [] Yes   [x] No - Send as HP to POD

## (undated) DEVICE — ESOPHAGEAL/COLONIC/BILIARY WIREGUIDED BALLOON DILATATION CATHETER: Brand: CRE™ PRO